# Patient Record
Sex: FEMALE | Race: WHITE | Employment: OTHER | URBAN - METROPOLITAN AREA
[De-identification: names, ages, dates, MRNs, and addresses within clinical notes are randomized per-mention and may not be internally consistent; named-entity substitution may affect disease eponyms.]

---

## 2017-01-05 ENCOUNTER — OFFICE VISIT (OUTPATIENT)
Dept: HEMATOLOGY/ONCOLOGY | Facility: HOSPITAL | Age: 82
End: 2017-01-05
Attending: INTERNAL MEDICINE
Payer: MEDICARE

## 2017-01-05 VITALS
DIASTOLIC BLOOD PRESSURE: 74 MMHG | BODY MASS INDEX: 20 KG/M2 | SYSTOLIC BLOOD PRESSURE: 164 MMHG | RESPIRATION RATE: 18 BRPM | HEART RATE: 68 BPM | TEMPERATURE: 97 F | WEIGHT: 107.81 LBS

## 2017-01-05 DIAGNOSIS — C50.411 BREAST CANCER OF UPPER-OUTER QUADRANT OF RIGHT FEMALE BREAST (HCC): Primary | ICD-10-CM

## 2017-01-05 DIAGNOSIS — C50.112 CANCER OF CENTRAL PORTION OF LEFT FEMALE BREAST (HCC): ICD-10-CM

## 2017-01-05 PROCEDURE — G0463 HOSPITAL OUTPT CLINIC VISIT: HCPCS | Performed by: INTERNAL MEDICINE

## 2017-01-05 PROCEDURE — 99213 OFFICE O/P EST LOW 20 MIN: CPT | Performed by: INTERNAL MEDICINE

## 2017-01-05 NOTE — PROGRESS NOTES
HPI    Patient returns for follow-up of her bilateral breast cancer. The first was a right possibly 40 years ago at [facility], and the second left hormone receptor positive, infiltrating ductal pT1a N0 M0 left breast cancer.   Patient had a [facility] Cardiovascular: Negative for chest pain and leg swelling. Gastrointestinal: Positive for diarrhea. Irritable bowel syndrome. Takes 1 imodium and that relieves symptoms for a time. Genitourinary: Positive for dysuria.         Cystitis, multiple Sexual Activity: Not on file   Not on file  Other Topics Concern    Caffeine Concern Yes    Comment: Coffee, 1 cup daily    Exercise No     Social History Narrative     Family History   Problem Relation Age of Onset   • Heart Disease Father      Per NG: c Cancer of central portion of left female breast (hcc)     Patient does not have evidence of local recurrence or metastatic disease. She will continue yearly mammograms. Patient has frequent UTI's. She is being followed by Dr. Sintia Cruz.     25 total agueda BI-RADS CATEGORY:       DIAGNOSTIC CATEGORY 2--BENIGN FINDING:         RECOMMENDATIONS:    ROUTINE SCREENING MAMMOGRAM AND CLINICAL EVALUATION.                Meds This Visit:  see list above    Imaging & Referrals:  [facility] (CPT=[id number])

## 2017-01-12 ENCOUNTER — OFFICE VISIT (OUTPATIENT)
Dept: SURGERY | Facility: CLINIC | Age: 82
End: 2017-01-12

## 2017-01-12 ENCOUNTER — APPOINTMENT (OUTPATIENT)
Dept: LAB | Age: 82
End: 2017-01-12
Attending: UROLOGY
Payer: MEDICARE

## 2017-01-12 ENCOUNTER — TELEPHONE (OUTPATIENT)
Dept: SURGERY | Facility: CLINIC | Age: 82
End: 2017-01-12

## 2017-01-12 VITALS — HEART RATE: 92 BPM | SYSTOLIC BLOOD PRESSURE: 146 MMHG | RESPIRATION RATE: 20 BRPM | DIASTOLIC BLOOD PRESSURE: 84 MMHG

## 2017-01-12 DIAGNOSIS — N39.0 RECURRENT UTI: Primary | ICD-10-CM

## 2017-01-12 DIAGNOSIS — N39.0 RECURRENT UTI: ICD-10-CM

## 2017-01-12 PROCEDURE — 87186 SC STD MICRODIL/AGAR DIL: CPT

## 2017-01-12 PROCEDURE — 99213 OFFICE O/P EST LOW 20 MIN: CPT | Performed by: UROLOGY

## 2017-01-12 PROCEDURE — 87088 URINE BACTERIA CULTURE: CPT

## 2017-01-12 PROCEDURE — 87086 URINE CULTURE/COLONY COUNT: CPT

## 2017-01-12 PROCEDURE — G0463 HOSPITAL OUTPT CLINIC VISIT: HCPCS | Performed by: UROLOGY

## 2017-01-12 RX ORDER — CIPROFLOXACIN 250 MG/1
250 TABLET, FILM COATED ORAL 2 TIMES DAILY
Qty: 6 TABLET | Refills: 3 | Status: SHIPPED | OUTPATIENT
Start: 2017-01-12 | End: 2017-01-27 | Stop reason: ALTCHOICE

## 2017-01-12 NOTE — TELEPHONE ENCOUNTER
Returned pt's call and spoke with her. She states she submitted a urine sample for c&s to [address] [place] [address] lab. Currently c/o urgency, frequency, and burning with urination. She denies fever or visualizing blood in urine.  She wants to know if Hansel Grier can order ab

## 2017-01-12 NOTE — TELEPHONE ENCOUNTER
Pt current symptoms: frequent, painful urination. Pt requesting a refill on: Cipro. pt states she left a urine sample at the [facility]/lab this morning.   Please advise, thank you. (pharmacy confirmed) unable to connect)

## 2017-01-12 NOTE — PROGRESS NOTES
Malcom Woodson is a 80year old female. HPI:   Patient presents with:  UTI: reoccuring UTIs.  Pt states she is currently waitin for urine culture results      72-year-old female with recurrent UTI-like symptoms presents in follow-up most recently seen Alcohol Use: No                 Medications (Active prior to today's visit):    Current Outpatient Prescriptions:  Cranberry-Vitamin C-Vitamin E (CRANBERRY PLUS VITAMIN C) 4200-20-3 MG-MG-UNIT Oral Cap Take 1 capsule by mouth 2 (two) times daily.  Disp:  Trang

## 2017-01-16 ENCOUNTER — TELEPHONE (OUTPATIENT)
Dept: SURGERY | Facility: CLINIC | Age: 82
End: 2017-01-16

## 2017-01-16 RX ORDER — CEFADROXIL 500 MG/1
500 CAPSULE ORAL 2 TIMES DAILY
Qty: 10 CAPSULE | Refills: 0 | Status: SHIPPED | OUTPATIENT
Start: 2017-01-16 | End: 2017-01-21

## 2017-01-16 NOTE — TELEPHONE ENCOUNTER
Staff please call this patient.   Let her know her urine culture is growing bacteria which is resistant to the Cipro that I prescribed I would recommend switching her to Duricef 500 mg p.o. twice daily and ask her to take probiotic pills alongside it since

## 2017-01-16 NOTE — TELEPHONE ENCOUNTER
Phoned pt and spoke with her. Read to her 's note as outlined below in this encounter, in it's entirety. Pt verbalized understanding, agrees to plan, and is thankful.

## 2017-01-16 NOTE — TELEPHONE ENCOUNTER
Dr. David Harvey, please see patient's final urine culture from 1/12/17. Cipro is resistant. See 1/12 last office visit. Patient has allergy to sulfa.

## 2017-01-27 ENCOUNTER — TELEPHONE (OUTPATIENT)
Dept: SURGERY | Facility: CLINIC | Age: 82
End: 2017-01-27

## 2017-01-27 ENCOUNTER — HOSPITAL ENCOUNTER (EMERGENCY)
Facility: HOSPITAL | Age: 82
Discharge: HOME | End: 2017-01-27
Attending: EMERGENCY MEDICINE
Payer: MEDICARE

## 2017-01-27 VITALS
BODY MASS INDEX: 20.39 KG/M2 | HEART RATE: 91 BPM | TEMPERATURE: 98 F | WEIGHT: 108 LBS | HEIGHT: 61 IN | OXYGEN SATURATION: 100 % | RESPIRATION RATE: 18 BRPM | DIASTOLIC BLOOD PRESSURE: 82 MMHG | SYSTOLIC BLOOD PRESSURE: 165 MMHG

## 2017-01-27 DIAGNOSIS — R33.9 URINARY RETENTION: Primary | ICD-10-CM

## 2017-01-27 LAB
BILIRUB UR QL: NEGATIVE
CLARITY UR: CLEAR
COLOR UR: YELLOW
GLUCOSE UR-MCNC: NEGATIVE MG/DL
HGB UR QL STRIP.AUTO: NEGATIVE
HYALINE CASTS #/AREA URNS AUTO: 1 /LPF
KETONES UR-MCNC: NEGATIVE MG/DL
NITRITE UR QL STRIP.AUTO: NEGATIVE
PH UR: 6 [PH] (ref 5–8)
PROT UR-MCNC: NEGATIVE MG/DL
RBC #/AREA URNS AUTO: 1 /HPF
SP GR UR STRIP: 1.01 (ref 1–1.03)
UROBILINOGEN UR STRIP-ACNC: <2
VIT C UR-MCNC: NEGATIVE MG/DL
WBC #/AREA URNS AUTO: 3 /HPF

## 2017-01-27 PROCEDURE — 99283 EMERGENCY DEPT VISIT LOW MDM: CPT

## 2017-01-27 PROCEDURE — 81001 URINALYSIS AUTO W/SCOPE: CPT

## 2017-01-27 RX ORDER — CEFADROXIL 500 MG/1
CAPSULE ORAL
Qty: 10 CAPSULE | Refills: 0 | Status: SHIPPED | OUTPATIENT
Start: 2017-01-27 | End: 2017-12-05

## 2017-01-27 NOTE — ED INITIAL ASSESSMENT (HPI)
Patient complains of  urinary retention since this am, admits to uti last week and finishing antibiotics last Saturday, denies pain at this time

## 2017-01-27 NOTE — TELEPHONE ENCOUNTER
Pt called again and she said that she has been trying to collect a urine specimen at home because she was given some sterile specimen containers but every time she feels that she has to urinate and goes to the BR she is unable to go.  She tried this 3 times

## 2017-01-27 NOTE — TELEPHONE ENCOUNTER
I spoke with pt and determined that she is still having burning with urination and perineal pressure despite having just finished Cefadroxil 500 mg BID for 5 days this past Sat. 1/21.  States that she is urinating 2 hours but does not have blood in her urin

## 2017-01-27 NOTE — ED NOTES
Patient voided approximately 700cc urine after arriving to room, states she feels much better. Last voided around 10am this morning.

## 2017-01-27 NOTE — ED PROVIDER NOTES
Patient Seen in: [facility] AND [facility] Emergency Department    History   Patient presents with:  Urinary Symptoms (urologic)    Stated Complaint: anuria    HPI    Patient complains of urinary retentin.   No urine for 5 hours, recent treatment for uti and se otherwise stated in HPI.     Physical Exam     ED Triage Vitals   BP 01/27/17 1519 165/82 mmHg   Pulse 01/27/17 1519 91   Resp 01/27/17 1519 18   Temp 01/27/17 1519 98 °F (36.7 °C)   Temp src --    SpO2 01/27/17 1519 100 %   O2 Device --        Current:BP 1

## 2017-01-30 ENCOUNTER — TELEPHONE (OUTPATIENT)
Dept: SURGERY | Facility: CLINIC | Age: 82
End: 2017-01-30

## 2017-02-07 NOTE — TELEPHONE ENCOUNTER
Patient contacted. Patient states on 1/27/17 she went to ER because she was having difficulty urinating. Patient states as soon as she got to ER, they told her it was ok to go in [place]", patient urinated fine. Urinalysis was sent.  Patient states she took

## 2017-02-08 NOTE — TELEPHONE ENCOUNTER
Urinalysis is mostly normal but I dont see that there was a urine culture done. Can we check on that?

## 2017-02-09 NOTE — TELEPHONE ENCOUNTER
Dr. Natalia Prado, I apologize; this is any FYI for you.  1/27 urinalysis from ER; no culture sent. Patient denies UTI symptoms. No further action required. Just FYI. Thanks.

## 2017-03-24 ENCOUNTER — APPOINTMENT (OUTPATIENT)
Dept: LAB | Age: 82
End: 2017-03-24
Attending: UROLOGY
Payer: MEDICARE

## 2017-03-24 DIAGNOSIS — N39.0 RECURRENT UTI: ICD-10-CM

## 2017-03-24 PROCEDURE — 87088 URINE BACTERIA CULTURE: CPT

## 2017-03-24 PROCEDURE — 87086 URINE CULTURE/COLONY COUNT: CPT

## 2017-03-24 PROCEDURE — 87186 SC STD MICRODIL/AGAR DIL: CPT

## 2017-03-27 ENCOUNTER — TELEPHONE (OUTPATIENT)
Dept: SURGERY | Facility: CLINIC | Age: 82
End: 2017-03-27

## 2017-03-27 RX ORDER — NITROFURANTOIN 25; 75 MG/1; MG/1
100 CAPSULE ORAL 2 TIMES DAILY
Qty: 20 CAPSULE | Refills: 0 | Status: SHIPPED | OUTPATIENT
Start: 2017-03-27 | End: 2017-04-06

## 2017-03-27 NOTE — TELEPHONE ENCOUNTER
Was asked by [facility], to speak with pt. Spoke with pt. She has positive c&s results. She is asking for abx. Advised her Guillermo Zarate is out of office, and Ivette Briones is covering, however he is in surgery at this time.  Advised her will notify , and will

## 2017-03-27 NOTE — TELEPHONE ENCOUNTER
Patient states that had lab work done on 03/24. Requesting results as soon as possible. Would like a call today. States has a bladder infection. Please call.  Thank you

## 2017-03-27 NOTE — TELEPHONE ENCOUNTER
Spoke with pt. She states she spoke with PCP and he wants urology to prescribe antibiotics. Paged , and spoke with him. Reviewed with him urine c&s results. He provided verbal order, ok to give Macrobid bid x 10 days. Or Keflex 500 bid x 10 days.  P

## 2017-03-31 ENCOUNTER — TELEPHONE (OUTPATIENT)
Dept: SURGERY | Facility: CLINIC | Age: 82
End: 2017-03-31

## 2017-03-31 NOTE — TELEPHONE ENCOUNTER
Phoned pt and spoke with her. She states she has had two episodes of diarrhea so far , since starting Macrobid 5 days ago. States she has been taking Florastore probiotic, since starting the abx.  States this always happens to her about the fourth or fifth

## 2017-03-31 NOTE — TELEPHONE ENCOUNTER
Pt experiencing diarrhea due to the antibiotics: Nitrofurantoin Monohyd Macro.  Please advise, thank you. (call connected)       Current Outpatient Prescriptions:  Nitrofurantoin Monohyd Macro 100 MG Oral Cap Take 1 capsule (100 mg total) by mouth 2 (two) t

## 2017-04-03 NOTE — TELEPHONE ENCOUNTER
Spoke with pt and told her that she does not have to submit another urine specimen if she is not having any symptoms. She understands and will call if her status changes.

## 2017-04-03 NOTE — TELEPHONE ENCOUNTER
Pt calling back with update, states she is no longer having symptoms, asking if she should still come in to give urine sample? Pls call thank you.

## 2017-06-27 ENCOUNTER — TELEPHONE (OUTPATIENT)
Dept: SURGERY | Facility: CLINIC | Age: 82
End: 2017-06-27

## 2017-06-27 NOTE — TELEPHONE ENCOUNTER
pt called. She has a standing order for urine good till 7/14/17. She asked if you could renew this order,  just in case she need after 7/14/17.     Thank you

## 2017-06-28 ENCOUNTER — APPOINTMENT (OUTPATIENT)
Dept: LAB | Age: 82
End: 2017-06-28
Attending: UROLOGY
Payer: MEDICARE

## 2017-06-28 PROCEDURE — 87077 CULTURE AEROBIC IDENTIFY: CPT | Performed by: UROLOGY

## 2017-06-28 PROCEDURE — 87086 URINE CULTURE/COLONY COUNT: CPT | Performed by: UROLOGY

## 2017-06-30 ENCOUNTER — TELEPHONE (OUTPATIENT)
Dept: SURGERY | Facility: CLINIC | Age: 82
End: 2017-06-30

## 2017-06-30 DIAGNOSIS — N39.0 URINARY TRACT INFECTION, SITE UNSPECIFIED: Primary | ICD-10-CM

## 2017-06-30 RX ORDER — AMOXICILLIN AND CLAVULANATE POTASSIUM 875; 125 MG/1; MG/1
1 TABLET, FILM COATED ORAL 2 TIMES DAILY
Qty: 14 TABLET | Refills: 0 | Status: SHIPPED | OUTPATIENT
Start: 2017-06-30 | End: 2017-07-10

## 2017-06-30 NOTE — TELEPHONE ENCOUNTER
Dr. Fili Marquez, pt is calling for test results, please advise. I copied and pasted results below. Pt also asking us to renew her standing order for urine culture I pended you the order if you agree please sign, thank you!     Collected:  6/28/2017  8:59 AM Stat

## 2017-06-30 NOTE — TELEPHONE ENCOUNTER
I prescribed Augmentin x 7 days if she has symptoms. Otherwise, if she has no symptoms no need for antibiotics. I also signed the standing order for a urine culture.   Thanks

## 2017-06-30 NOTE — TELEPHONE ENCOUNTER
Pt requesting urine test results. And has questions re: medications prescribed. Please call, thank you.

## 2017-07-07 NOTE — TELEPHONE ENCOUNTER
Spoke with pt and she reported that she already finished the ABX and is feeling better. She asked for an appt for her 6 mo f/u which is this mo.  I gave he an appt for Monday, 7/10 at 11:20am.

## 2017-07-10 ENCOUNTER — OFFICE VISIT (OUTPATIENT)
Dept: SURGERY | Facility: CLINIC | Age: 82
End: 2017-07-10

## 2017-07-10 VITALS
BODY MASS INDEX: 19.14 KG/M2 | SYSTOLIC BLOOD PRESSURE: 130 MMHG | RESPIRATION RATE: 16 BRPM | DIASTOLIC BLOOD PRESSURE: 80 MMHG | HEART RATE: 86 BPM | WEIGHT: 108 LBS | HEIGHT: 63 IN

## 2017-07-10 DIAGNOSIS — N39.0 RECURRENT UTI: Primary | ICD-10-CM

## 2017-07-10 DIAGNOSIS — R82.90 URINE FINDING: ICD-10-CM

## 2017-07-10 LAB
APPEARANCE: CLEAR
MULTISTIX LOT#: NORMAL NUMERIC
PH, URINE: 5 (ref 4.5–8)
SPECIFIC GRAVITY: 1.02 (ref 1–1.03)
URINE-COLOR: YELLOW
UROBILINOGEN,SEMI-QN: 0.2 MG/DL (ref 0–1.9)

## 2017-07-10 PROCEDURE — 81002 URINALYSIS NONAUTO W/O SCOPE: CPT | Performed by: UROLOGY

## 2017-07-10 PROCEDURE — 99213 OFFICE O/P EST LOW 20 MIN: CPT | Performed by: UROLOGY

## 2017-07-10 PROCEDURE — G0463 HOSPITAL OUTPT CLINIC VISIT: HCPCS | Performed by: UROLOGY

## 2017-07-20 ENCOUNTER — APPOINTMENT (OUTPATIENT)
Dept: LAB | Age: 82
End: 2017-07-20
Attending: UROLOGY
Payer: MEDICARE

## 2017-07-20 DIAGNOSIS — N39.0 RECURRENT UTI: ICD-10-CM

## 2017-07-20 PROCEDURE — 87088 URINE BACTERIA CULTURE: CPT

## 2017-07-20 PROCEDURE — 87186 SC STD MICRODIL/AGAR DIL: CPT

## 2017-07-20 PROCEDURE — 87086 URINE CULTURE/COLONY COUNT: CPT

## 2017-07-24 ENCOUNTER — TELEPHONE (OUTPATIENT)
Dept: SURGERY | Facility: CLINIC | Age: 82
End: 2017-07-24

## 2017-07-24 NOTE — TELEPHONE ENCOUNTER
pt is calling for her urine results. She would Really like to start on meds today. She states she is uncomfortable. Thank you.

## 2017-07-24 NOTE — TELEPHONE ENCOUNTER
pt called for 7/20/17 urine results. pt is uncomfortable, she would like a RX.  pt states Cant take Nitrofurantoin or Cephalexin   Thank you.

## 2017-07-25 RX ORDER — NITROFURANTOIN 25; 75 MG/1; MG/1
100 CAPSULE ORAL EVERY 12 HOURS
Qty: 20 CAPSULE | Refills: 0 | Status: SHIPPED | OUTPATIENT
Start: 2017-07-25 | End: 2017-12-05

## 2017-07-25 NOTE — TELEPHONE ENCOUNTER
She spoke with pt and determined that she has been calling about starting ABX meds because she has severe pain and burning with urination, cloudy foul smelling urine, and urinary frequency every hr.  I apologized that we did not get back to her sooner and I

## 2017-07-25 NOTE — TELEPHONE ENCOUNTER
Pt requesting urine culture results - upset because she has not received a cb - please advise - thank you.

## 2017-08-31 ENCOUNTER — APPOINTMENT (OUTPATIENT)
Dept: LAB | Age: 82
End: 2017-08-31
Attending: UROLOGY
Payer: MEDICARE

## 2017-08-31 DIAGNOSIS — N39.0 RECURRENT UTI: ICD-10-CM

## 2017-08-31 PROCEDURE — 87086 URINE CULTURE/COLONY COUNT: CPT

## 2017-09-05 ENCOUNTER — TELEPHONE (OUTPATIENT)
Dept: SURGERY | Facility: CLINIC | Age: 82
End: 2017-09-05

## 2017-09-06 NOTE — TELEPHONE ENCOUNTER
Spoke with pt and informed her that the urine test results did not show an infection and she states that she keeps having these periods on and off where she gets this urinary frequency and then slight burning with urination so she thinks that she has an in

## 2017-09-06 NOTE — TELEPHONE ENCOUNTER
Pt called stating pt has not heard back from the office with her urine test results. Pt does not know what to do. Pt might have an infection.     Please call

## 2017-09-08 RX ORDER — NITROFURANTOIN MACROCRYSTALS 50 MG/1
50 CAPSULE ORAL NIGHTLY
Qty: 90 CAPSULE | Refills: 3 | Status: SHIPPED | OUTPATIENT
Start: 2017-09-08 | End: 2017-12-05

## 2017-09-08 NOTE — TELEPHONE ENCOUNTER
I think we had treated her with nitrofurantoin 50 mg p.o. nightly. I reviewed her cultures and she did have one positive culture in July.   The only side effect that we are worried about them that we tell patients about his pulmonary fibrosis or scarring i

## 2017-09-08 NOTE — TELEPHONE ENCOUNTER
Spoke with pt and informed her of ELISHA's response in his msg belwo and she verbalized understanding but does not want to risk the SE of pulmonary fibrosis and decide that she is going to increase the use of her Cranberry pills to 6 tabs per day and also try

## 2017-09-14 ENCOUNTER — OFFICE VISIT (OUTPATIENT)
Dept: AUDIOLOGY | Facility: CLINIC | Age: 82
End: 2017-09-14

## 2017-09-14 DIAGNOSIS — H90.3 SENSORINEURAL HEARING LOSS, BILATERAL: Primary | ICD-10-CM

## 2017-09-14 PROCEDURE — V5264 EAR MOLD/INSERT: HCPCS | Performed by: AUDIOLOGIST

## 2017-09-14 PROCEDURE — 92593 HEARING AID CHECK, BOTH EARS: CPT | Performed by: AUDIOLOGIST

## 2017-09-14 NOTE — PROGRESS NOTES
HEARING AID FOLLOW-UP    Caiocelena Anayashabana  3/2/1925  [id number]      Pt was seen for a 12 months follow-up. Aids were in good condition with the exception of a large hole in the right cShell. Pt agreed to a new cShell.   Pt advised about the charge of $1

## 2017-10-05 ENCOUNTER — OFFICE VISIT (OUTPATIENT)
Dept: AUDIOLOGY | Facility: CLINIC | Age: 82
End: 2017-10-05

## 2017-10-05 ENCOUNTER — OFFICE VISIT (OUTPATIENT)
Dept: OTOLARYNGOLOGY | Facility: CLINIC | Age: 82
End: 2017-10-05

## 2017-10-05 VITALS
HEIGHT: 61 IN | DIASTOLIC BLOOD PRESSURE: 90 MMHG | SYSTOLIC BLOOD PRESSURE: 144 MMHG | BODY MASS INDEX: 20.39 KG/M2 | WEIGHT: 108 LBS | TEMPERATURE: 98 F

## 2017-10-05 DIAGNOSIS — H90.3 SENSORINEURAL HEARING LOSS, BILATERAL: Primary | ICD-10-CM

## 2017-10-05 DIAGNOSIS — H61.23 BILATERAL IMPACTED CERUMEN: Primary | ICD-10-CM

## 2017-10-05 PROCEDURE — V5266 BATTERY FOR HEARING DEVICE: HCPCS | Performed by: AUDIOLOGIST

## 2017-10-05 PROCEDURE — 69210 REMOVE IMPACTED EAR WAX UNI: CPT | Performed by: OTOLARYNGOLOGY

## 2017-10-05 PROCEDURE — 92593 HEARING AID CHECK, BOTH EARS: CPT | Performed by: AUDIOLOGIST

## 2017-10-05 NOTE — PROGRESS NOTES
HEARING AID FOLLOW-UP    Vernon Sharma  3/2/1925  [id number]    Dispensed new right cShell: [id number]   224    Software indicated a connection problem for both aids and indicated to reset to factory settings.   Reset right aid to factory settin

## 2017-10-05 NOTE — PROGRESS NOTES
Chester Lee is a 80year old female. Patient presents with:  Ear Problem: cleaning      HISTORY OF PRESENT ILLNESS    Patient presents for cerumen removal. No other complaints or concerns at this time    Social History    Marital status:   rash.   Hema/Lymph Negative Easy bleeding and easy bruising.            PHYSICAL EXAM    /90 (BP Location: Left arm)   Temp 98 °F (36.7 °C) (Tympanic)   Ht 5' 1\" (1.549 m)   Wt 108 lb (49 kg)   BMI 20.41 kg/m²        Constitutional Normal Overall livier (PRINIVIL,ZESTRIL) 10 MG Oral Tab, Take 5 mg by mouth.  , Disp: , Rfl:   •  PARoxetine HCl (PAXIL) 10 MG Oral Tab, Take  by mouth. take 1 tablet (10MG)  by ORAL route  every day, Disp: , Rfl:   ASSESSMENT AND PLAN    1.  Bilateral impacted cerumen    - LUIZ

## 2017-10-05 NOTE — PATIENT INSTRUCTIONS
Earwax Removal    The ear canal makes earwax from the canal’s lining. The ears make wax to lubricate and protect the ear canal. The ear canal is the tube that connects the middle ear to the outside of the ear.  The wax protects the ear from bacteria, infe · Don’t use cotton swabs in your ears. Cotton swabs may push wax deeper into the ear canal or damage the eardrum.  Use cotton gauze or a wet washcloth  to gently remove wax on the outside of the ear and around the opening to the ear canal.  · Don't use any © 0893-0368 [address], [address] [place]. All rights reserved. This information is not intended as a substitute for professional medical care. Always follow your healthcare professional's instructions.

## 2017-11-08 ENCOUNTER — HOSPITAL ENCOUNTER (OUTPATIENT)
Age: 82
Discharge: HOME | End: 2017-11-08
Attending: FAMILY MEDICINE
Payer: MEDICARE

## 2017-11-08 VITALS
OXYGEN SATURATION: 98 % | SYSTOLIC BLOOD PRESSURE: 147 MMHG | TEMPERATURE: 98 F | HEART RATE: 89 BPM | RESPIRATION RATE: 18 BRPM | DIASTOLIC BLOOD PRESSURE: 91 MMHG

## 2017-11-08 DIAGNOSIS — J06.9 VIRAL UPPER RESPIRATORY TRACT INFECTION: Primary | ICD-10-CM

## 2017-11-08 PROCEDURE — 99214 OFFICE O/P EST MOD 30 MIN: CPT

## 2017-11-08 PROCEDURE — 87430 STREP A AG IA: CPT

## 2017-11-08 PROCEDURE — 99213 OFFICE O/P EST LOW 20 MIN: CPT

## 2017-11-08 RX ORDER — AMOXICILLIN 875 MG/1
875 TABLET, COATED ORAL 2 TIMES DAILY
Qty: 20 TABLET | Refills: 0 | Status: SHIPPED | OUTPATIENT
Start: 2017-11-08 | End: 2017-11-18

## 2017-11-08 NOTE — ED INITIAL ASSESSMENT (HPI)
Sore throat and cold for one week. Easy non labored resp. Speech clear runny nose no cough.  Has taken nothing for symptoms

## 2017-11-08 NOTE — ED PROVIDER NOTES
Patient Seen in: [facility] In [address] [place] [address]    History   Patient presents with:  Sore Throat    Stated Complaint: sorethroat    CC: st    HPI: Pt p/w co st, cough, congestion x I week; No fever, no chills, \"was worried about strep. \" Exam  ED Triage Vitals (11/08/17 0905)  BP: 147/91  Pulse: 89  Resp: 18  Temp: 98 °F (36.7 °C)  Temp src: Oral  SpO2: 98 %  O2 Device: None (Room air)    General Appearance:    Alert, cooperative, no distress, appears stated age  Head:    Normocephalic, wi note and vitals reviewed.           ED Course     Labs Reviewed   EM POCT RAPID STREP - Normal      pgs ss neg    ED Course as of Nov 08 1002  ------------------------------------------------------------       MDM       ddx dw: viral uri vs pauline Leonard

## 2017-11-08 NOTE — ED NOTES
Increase po fluids rest tylenol for fever or pain. Call and follow up with pcp in 3 days if not better.  Go to the ed for new or worse concerns

## 2017-11-09 ENCOUNTER — OFFICE VISIT (OUTPATIENT)
Dept: AUDIOLOGY | Facility: CLINIC | Age: 82
End: 2017-11-09

## 2017-11-09 DIAGNOSIS — H90.3 SENSORINEURAL HEARING LOSS, BILATERAL: Primary | ICD-10-CM

## 2017-11-09 PROCEDURE — 92593 HEARING AID CHECK, BOTH EARS: CPT | Performed by: AUDIOLOGIST

## 2017-11-10 NOTE — PROGRESS NOTES
HEARING AID FOLLOW-UP    Raji Christopher  3/2/1925  [id number]    Patient noted poorer hearing since last programming change using most current audiogram.  Programmed aids to previous settings which pt reported was louder/clearer. NC visit.   RV as ruddy

## 2017-12-05 ENCOUNTER — OFFICE VISIT (OUTPATIENT)
Dept: SURGERY | Facility: CLINIC | Age: 82
End: 2017-12-05

## 2017-12-05 VITALS
WEIGHT: 108 LBS | DIASTOLIC BLOOD PRESSURE: 92 MMHG | SYSTOLIC BLOOD PRESSURE: 160 MMHG | HEIGHT: 61 IN | BODY MASS INDEX: 20.39 KG/M2 | TEMPERATURE: 98 F | HEART RATE: 73 BPM

## 2017-12-05 DIAGNOSIS — N39.0 RECURRENT UTI: Primary | ICD-10-CM

## 2017-12-05 DIAGNOSIS — R82.90 URINE FINDING: ICD-10-CM

## 2017-12-05 PROCEDURE — 99213 OFFICE O/P EST LOW 20 MIN: CPT | Performed by: UROLOGY

## 2017-12-05 PROCEDURE — 81003 URINALYSIS AUTO W/O SCOPE: CPT | Performed by: UROLOGY

## 2017-12-05 RX ORDER — CIPROFLOXACIN 250 MG/1
250 TABLET, FILM COATED ORAL 2 TIMES DAILY
Qty: 10 TABLET | Refills: 3 | Status: SHIPPED | OUTPATIENT
Start: 2017-12-05 | End: 2018-12-06 | Stop reason: ALTCHOICE

## 2017-12-05 NOTE — PROGRESS NOTES
Magnus Bowie is a 80year old female. HPI:   Patient presents with:  UTI: Denies recurrent UTI's, flank pain , dysuria and gross hematuria. 80year-old presents in follow-up to a previous visit life 10/2017.   She has a history of recurrent ur (VITAMIN D) 2000 UNITS Oral Cap Take 1 capsule by mouth daily. Disp:  Rfl:    lisinopril (PRINIVIL,ZESTRIL) 10 MG Oral Tab Take 5 mg by mouth. Disp:  Rfl:    PARoxetine HCl (PAXIL) 10 MG Oral Tab Take  by mouth.  take 1 tablet (10MG)  by ORAL route  every

## 2017-12-18 ENCOUNTER — HOSPITAL ENCOUNTER (OUTPATIENT)
Dept: MAMMOGRAPHY | Facility: HOSPITAL | Age: 82
Discharge: HOME | End: 2017-12-18
Attending: INTERNAL MEDICINE
Payer: MEDICARE

## 2017-12-18 DIAGNOSIS — C50.411 BREAST CANCER OF UPPER-OUTER QUADRANT OF RIGHT FEMALE BREAST (HCC): ICD-10-CM

## 2017-12-18 DIAGNOSIS — C50.112 CANCER OF CENTRAL PORTION OF LEFT FEMALE BREAST (HCC): ICD-10-CM

## 2017-12-18 PROCEDURE — 77066 DX MAMMO INCL CAD BI: CPT | Performed by: INTERNAL MEDICINE

## 2018-01-04 ENCOUNTER — OFFICE VISIT (OUTPATIENT)
Dept: HEMATOLOGY/ONCOLOGY | Facility: HOSPITAL | Age: 83
End: 2018-01-04
Attending: INTERNAL MEDICINE
Payer: MEDICARE

## 2018-01-04 VITALS
TEMPERATURE: 98 F | HEART RATE: 77 BPM | RESPIRATION RATE: 18 BRPM | BODY MASS INDEX: 20 KG/M2 | DIASTOLIC BLOOD PRESSURE: 81 MMHG | SYSTOLIC BLOOD PRESSURE: 159 MMHG | WEIGHT: 108 LBS

## 2018-01-04 DIAGNOSIS — Z17.0 MALIGNANT NEOPLASM OF CENTRAL PORTION OF LEFT BREAST IN FEMALE, ESTROGEN RECEPTOR POSITIVE (HCC): Primary | ICD-10-CM

## 2018-01-04 DIAGNOSIS — K21.9 GASTROESOPHAGEAL REFLUX DISEASE WITHOUT ESOPHAGITIS: ICD-10-CM

## 2018-01-04 DIAGNOSIS — C50.411 MALIGNANT NEOPLASM OF UPPER-OUTER QUADRANT OF RIGHT FEMALE BREAST, UNSPECIFIED ESTROGEN RECEPTOR STATUS (HCC): ICD-10-CM

## 2018-01-04 DIAGNOSIS — F41.9 ANXIETY AND DEPRESSION: ICD-10-CM

## 2018-01-04 DIAGNOSIS — C50.112 MALIGNANT NEOPLASM OF CENTRAL PORTION OF LEFT BREAST IN FEMALE, ESTROGEN RECEPTOR POSITIVE (HCC): Primary | ICD-10-CM

## 2018-01-04 DIAGNOSIS — K58.2 IRRITABLE BOWEL SYNDROME WITH BOTH CONSTIPATION AND DIARRHEA: ICD-10-CM

## 2018-01-04 DIAGNOSIS — F32.A ANXIETY AND DEPRESSION: ICD-10-CM

## 2018-01-04 DIAGNOSIS — N39.0 URINARY TRACT INFECTION WITHOUT HEMATURIA, SITE UNSPECIFIED: ICD-10-CM

## 2018-01-04 PROCEDURE — 99213 OFFICE O/P EST LOW 20 MIN: CPT | Performed by: INTERNAL MEDICINE

## 2018-01-04 PROCEDURE — G0463 HOSPITAL OUTPT CLINIC VISIT: HCPCS | Performed by: INTERNAL MEDICINE

## 2018-01-04 NOTE — PROGRESS NOTES
HPI    Patient returns for follow-up of her bilateral breast cancer. The first was a right possibly 40 years ago at [place], and the second left hormone receptor positive, infiltrating ductal pT1a N0 M0 left breast cancer.   Patient had a [facility] Eyes: Positive for itching. Reading glasses   Respiratory: Negative for chest tightness and shortness of breath. Cardiovascular: Negative for chest pain and leg swelling.         Blood pressure at home this morning was 129/84   Gastrointestinal: P • Essential hypertension    • Irritable bowel syndrome    • S/P ear surgery     Per NG: Ear surgery X2   • Urinary tract infection 10/29/2013   • Varicose veins     Per NG:Stripping and ligation     Past Surgical History:  No date: CHOLECYSTECTOMY      Com Pulmonary/Chest: Effort normal and breath sounds normal. No respiratory distress. She exhibits no tenderness. Right breast exhibits no inverted nipple, no mass, no nipple discharge, no skin change and no tenderness. Left breast exhibits skin change.  Left b Patient is continuing therapy with paroxetine 10 mg daily. Patient's blood pressure was elevated here today but was normal at home this morning. 25 total minutes spent with patient >50% of visit was spent in counseling and coordination of care.      R ROUTINE SCREENING MAMMOGRAM AND CLINICAL EVALUATION.                Meds This Visit:  see list above    Imaging & Referrals:  Diagnostic bilateral mammogram December 2018

## 2018-01-07 PROBLEM — F41.9 ANXIETY AND DEPRESSION: Status: ACTIVE | Noted: 2018-01-07

## 2018-01-07 PROBLEM — K58.9 IRRITABLE BOWEL SYNDROME: Status: ACTIVE | Noted: 2018-01-07

## 2018-01-07 PROBLEM — F32.A ANXIETY AND DEPRESSION: Status: ACTIVE | Noted: 2018-01-07

## 2018-09-25 ENCOUNTER — APPOINTMENT (OUTPATIENT)
Dept: LAB | Age: 83
End: 2018-09-25
Attending: UROLOGY
Payer: MEDICARE

## 2018-09-25 DIAGNOSIS — N39.0 RECURRENT UTI: ICD-10-CM

## 2018-09-25 PROCEDURE — 87086 URINE CULTURE/COLONY COUNT: CPT

## 2018-09-25 PROCEDURE — 87077 CULTURE AEROBIC IDENTIFY: CPT

## 2018-09-25 PROCEDURE — 87186 SC STD MICRODIL/AGAR DIL: CPT

## 2018-09-27 ENCOUNTER — TELEPHONE (OUTPATIENT)
Dept: SURGERY | Facility: CLINIC | Age: 83
End: 2018-09-27

## 2018-09-27 DIAGNOSIS — N39.0 URINARY TRACT INFECTION WITHOUT HEMATURIA, SITE UNSPECIFIED: Primary | ICD-10-CM

## 2018-09-27 RX ORDER — AMOXICILLIN AND CLAVULANATE POTASSIUM 875; 125 MG/1; MG/1
1 TABLET, FILM COATED ORAL 2 TIMES DAILY
Qty: 14 TABLET | Refills: 0 | Status: SHIPPED | OUTPATIENT
Start: 2018-09-27 | End: 2018-10-04

## 2018-09-27 NOTE — TELEPHONE ENCOUNTER
pt called. She asked if you could you please place a new standing order for urinalysis, she states this is about to  soon. And She is very thankful for you calling her antibiotics in so quickly for her UTI.

## 2018-09-27 NOTE — TELEPHONE ENCOUNTER
Phoned pt and spoke with her. Informed her of abx as ordered. She states she already picked up the rx and is thankful. She will call back if any issues.

## 2018-09-28 ENCOUNTER — OFFICE VISIT (OUTPATIENT)
Dept: AUDIOLOGY | Facility: CLINIC | Age: 83
End: 2018-09-28

## 2018-09-28 DIAGNOSIS — H90.6 MIXED HEARING LOSS, BILATERAL: Primary | ICD-10-CM

## 2018-09-28 PROCEDURE — 92593 HEARING AID CHECK, BOTH EARS: CPT | Performed by: AUDIOLOGIST

## 2018-09-28 PROCEDURE — V5266 BATTERY FOR HEARING DEVICE: HCPCS | Performed by: AUDIOLOGIST

## 2018-09-28 NOTE — TELEPHONE ENCOUNTER
I signed this order. Please make sure patient is aware however she need to follow up annually, she is due to follow up in December.       Thank you,  Adela NEGRETE

## 2018-09-28 NOTE — TELEPHONE ENCOUNTER
Patient's last office visit = 12/2017 for recurrent UTI. Patient is requesting standing order for urine cultures. At 7/10/17 office visit;  Assessment/Plan states that MD provided her with standing order for urine culture if she gets recurrent UTI symp

## 2018-09-28 NOTE — PROGRESS NOTES
HEARING AID FOLLOW-UP    Bee Ernesto  3/2/1925  [id number]    Patient is here for an annual check.     In office the following actions were taken:  Cleaned aid  Cleaned earmold  Suctioned microphone port  Suctioned  port  Changed microphone gu

## 2018-10-04 NOTE — TELEPHONE ENCOUNTER
Spoke to patient and notified her that standing orders for urine culture were extended in her chart and that she should f/u in [facility] for f/u visit appointment made

## 2018-10-10 ENCOUNTER — APPOINTMENT (OUTPATIENT)
Dept: LAB | Age: 83
End: 2018-10-10
Attending: UROLOGY
Payer: MEDICARE

## 2018-10-10 DIAGNOSIS — N39.0 RECURRENT UTI: ICD-10-CM

## 2018-10-10 DIAGNOSIS — N39.0 URINARY TRACT INFECTION WITHOUT HEMATURIA, SITE UNSPECIFIED: ICD-10-CM

## 2018-10-10 PROCEDURE — 87086 URINE CULTURE/COLONY COUNT: CPT

## 2018-10-10 PROCEDURE — 87088 URINE BACTERIA CULTURE: CPT

## 2018-10-10 PROCEDURE — 87186 SC STD MICRODIL/AGAR DIL: CPT

## 2018-10-12 ENCOUNTER — TELEPHONE (OUTPATIENT)
Dept: SURGERY | Facility: CLINIC | Age: 83
End: 2018-10-12

## 2018-10-12 RX ORDER — CEFADROXIL 500 MG/1
CAPSULE ORAL
Qty: 10 CAPSULE | Refills: 0 | Status: SHIPPED | OUTPATIENT
Start: 2018-10-12 | End: 2018-12-06 | Stop reason: ALTCHOICE

## 2018-10-12 NOTE — TELEPHONE ENCOUNTER
I spoke with pt and informed her of ELISHA's results msg below and she verbalized understanding and I told her that I will send the Cefadroxil to her [facility] pharmacy.      Result Notes for URINE CULTURE, ROUTINE     Notes recorded by Mayito Pacheco MD on 10/11/2

## 2018-10-23 ENCOUNTER — APPOINTMENT (OUTPATIENT)
Dept: LAB | Age: 83
End: 2018-10-23
Attending: UROLOGY
Payer: MEDICARE

## 2018-10-23 DIAGNOSIS — N39.0 RECURRENT UTI: ICD-10-CM

## 2018-10-23 PROCEDURE — 87186 SC STD MICRODIL/AGAR DIL: CPT

## 2018-10-23 PROCEDURE — 87086 URINE CULTURE/COLONY COUNT: CPT

## 2018-10-25 ENCOUNTER — TELEPHONE (OUTPATIENT)
Dept: SURGERY | Facility: CLINIC | Age: 83
End: 2018-10-25

## 2018-10-25 DIAGNOSIS — N39.0 RECURRENT UTI: Primary | ICD-10-CM

## 2018-10-25 RX ORDER — AMOXICILLIN AND CLAVULANATE POTASSIUM 875; 125 MG/1; MG/1
1 TABLET, FILM COATED ORAL 2 TIMES DAILY
Qty: 14 TABLET | Refills: 0 | Status: SHIPPED | OUTPATIENT
Start: 2018-10-25 | End: 2018-11-01

## 2018-10-25 NOTE — TELEPHONE ENCOUNTER
I called and spoke with patient. She complains of frequency, urgency, and dysuria. She denies fevers, chills, flank pain, or gross hematuria. She has been drinking water and she also takes cranberry pills. Plan to treat.   This is her 3rd infection in 1

## 2018-11-06 ENCOUNTER — APPOINTMENT (OUTPATIENT)
Dept: LAB | Age: 83
End: 2018-11-06
Attending: NURSE PRACTITIONER
Payer: MEDICARE

## 2018-11-06 PROCEDURE — 87186 SC STD MICRODIL/AGAR DIL: CPT | Performed by: NURSE PRACTITIONER

## 2018-11-06 PROCEDURE — 87077 CULTURE AEROBIC IDENTIFY: CPT | Performed by: NURSE PRACTITIONER

## 2018-11-06 PROCEDURE — 87086 URINE CULTURE/COLONY COUNT: CPT | Performed by: NURSE PRACTITIONER

## 2018-11-07 ENCOUNTER — TELEPHONE (OUTPATIENT)
Dept: SURGERY | Facility: CLINIC | Age: 83
End: 2018-11-07

## 2018-11-07 DIAGNOSIS — N39.0 URINARY TRACT INFECTION WITHOUT HEMATURIA, SITE UNSPECIFIED: Primary | ICD-10-CM

## 2018-11-07 DIAGNOSIS — N39.0 RECURRENT UTI: Primary | ICD-10-CM

## 2018-11-07 RX ORDER — NITROFURANTOIN 25; 75 MG/1; MG/1
100 CAPSULE ORAL 2 TIMES DAILY
Qty: 20 CAPSULE | Refills: 0 | Status: SHIPPED | OUTPATIENT
Start: 2018-11-07 | End: 2018-11-17

## 2018-11-07 NOTE — TELEPHONE ENCOUNTER
Patient calling. Requesting to speak with clinical staff regarding results that were also given to her daughter.  -- PLEASE SEE PREVIOUS TE

## 2018-11-07 NOTE — TELEPHONE ENCOUNTER
----- Message from PENELOPE Drew sent at 11/7/2018  2:30 PM CST -----  Staff,    Patient has a standing order for urine culture. Urine culture positive. She has had recurrent infections over the last 2 months.   She has a follow up scheduled wi

## 2018-11-07 NOTE — TELEPHONE ENCOUNTER
Phoned pt twice on home number, however received solid busy signal,twice. LORENZO noted in epic, for Rodrigo Winters. Phoned Rodrigo Winters  and received her voice mail. Left detailed message regarding abx, and need to call back to confirm message received.  Clinic call back numb

## 2018-11-07 NOTE — TELEPHONE ENCOUNTER
Nuno Cross returned call. She confirms she received my call, and states pt is her mother. Read to her APN, Megha's result note as outlined below in this encounter, in it's entirety. Verified fov with her, as well.  She verbalized understanding, agrees to

## 2018-11-08 NOTE — TELEPHONE ENCOUNTER
I spoke to Dr. Melissa Charles regarding this patient and due to her recurrent UTI he would like her to have a kidney US before her visit with him. Can you please let her know. I will place the order.     Thank you,  Lexis NEGRETE

## 2018-11-08 NOTE — TELEPHONE ENCOUNTER
Phoned pt and spoke with her. Read to her Apn, Megha's note as outlined below in this encounter, in it's entirety. Pt verbalized understanding, agrees to plan, and is thankful. Provided her with central scheduling tel # to arrange .  Pt would like Kevin Fischer

## 2018-11-15 ENCOUNTER — HOSPITAL ENCOUNTER (OUTPATIENT)
Dept: ULTRASOUND IMAGING | Facility: HOSPITAL | Age: 83
Discharge: HOME | End: 2018-11-15
Attending: NURSE PRACTITIONER
Payer: MEDICARE

## 2018-11-15 DIAGNOSIS — N39.0 RECURRENT UTI: ICD-10-CM

## 2018-11-15 PROCEDURE — 76775 US EXAM ABDO BACK WALL LIM: CPT | Performed by: NURSE PRACTITIONER

## 2018-11-20 ENCOUNTER — TELEPHONE (OUTPATIENT)
Dept: SURGERY | Facility: CLINIC | Age: 83
End: 2018-11-20

## 2018-11-20 DIAGNOSIS — D17.71 RENAL ANGIOMYOLIPOMA: Primary | ICD-10-CM

## 2018-11-20 NOTE — TELEPHONE ENCOUNTER
Spoke with pt and informed her that the results are up but have not been signed off yet and I will send a msg to [address] [place] [address] asking either to please sign off the renal US results of 11/15 and we will get back to her with a response. Tasked to [facility] and ELISHA.

## 2018-11-21 NOTE — TELEPHONE ENCOUNTER
Staff please call her back. Renal US shows no kidney swelling or stones. There are some kidney cysts on both sides.   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

## 2018-11-23 NOTE — TELEPHONE ENCOUNTER
Spoke with pt and informed her of ELISHA's msg and instructions below and she verbalized understanding and will comply. I gave the schdg # to call and pt has an appt already for 12/6.

## 2018-11-29 ENCOUNTER — HOSPITAL ENCOUNTER (OUTPATIENT)
Dept: CT IMAGING | Facility: HOSPITAL | Age: 83
Discharge: HOME | End: 2018-11-29
Attending: UROLOGY
Payer: MEDICARE

## 2018-11-29 DIAGNOSIS — D17.71 RENAL ANGIOMYOLIPOMA: ICD-10-CM

## 2018-11-29 PROCEDURE — 76376 3D RENDER W/INTRP POSTPROCES: CPT | Performed by: UROLOGY

## 2018-11-29 PROCEDURE — 74178 CT ABD&PLV WO CNTR FLWD CNTR: CPT | Performed by: UROLOGY

## 2018-11-29 PROCEDURE — 82565 ASSAY OF CREATININE: CPT

## 2018-12-05 ENCOUNTER — TELEPHONE (OUTPATIENT)
Dept: SURGERY | Facility: CLINIC | Age: 83
End: 2018-12-05

## 2018-12-05 NOTE — TELEPHONE ENCOUNTER
----- Message from Marcellus Storm MD sent at 12/4/2018  2:41 PM CST -----  Staff please call this patient. 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 This is a fatty tumor that does not tend to metastasize as suggested by the ultrasound but can be prone to bleeding or pain if it becomes larger in size. Please have her follow-up to see me in the next 1-2 weeks to review these results in more detail and determine if additional therapy or intervention is warranted.

## 2018-12-06 ENCOUNTER — TELEPHONE (OUTPATIENT)
Dept: SURGERY | Facility: CLINIC | Age: 83
End: 2018-12-06

## 2018-12-06 ENCOUNTER — OFFICE VISIT (OUTPATIENT)
Dept: SURGERY | Facility: CLINIC | Age: 83
End: 2018-12-06
Payer: MEDICARE

## 2018-12-06 VITALS
HEART RATE: 74 BPM | HEIGHT: 60 IN | DIASTOLIC BLOOD PRESSURE: 76 MMHG | SYSTOLIC BLOOD PRESSURE: 144 MMHG | BODY MASS INDEX: 20.62 KG/M2 | WEIGHT: 105 LBS

## 2018-12-06 DIAGNOSIS — D17.71 RENAL ANGIOMYOLIPOMA: ICD-10-CM

## 2018-12-06 DIAGNOSIS — N39.0 RECURRENT UTI: Primary | ICD-10-CM

## 2018-12-06 PROCEDURE — G0463 HOSPITAL OUTPT CLINIC VISIT: HCPCS | Performed by: UROLOGY

## 2018-12-06 PROCEDURE — 99213 OFFICE O/P EST LOW 20 MIN: CPT | Performed by: UROLOGY

## 2018-12-06 RX ORDER — CIPROFLOXACIN 250 MG/1
250 TABLET, FILM COATED ORAL 2 TIMES DAILY
Qty: 10 TABLET | Refills: 3 | Status: SHIPPED | OUTPATIENT
Start: 2018-12-06 | End: 2018-12-06 | Stop reason: CLARIF

## 2018-12-06 RX ORDER — CIPROFLOXACIN 250 MG/1
TABLET, FILM COATED ORAL
Qty: 10 TABLET | Refills: 3 | Status: SHIPPED | OUTPATIENT
Start: 2018-12-06 | End: 2019-02-01

## 2018-12-06 NOTE — TELEPHONE ENCOUNTER
Spoke with ELISHA and informed him of the error on the Cipro script and he stated that he meant to order Cipro 250 mg 1 tab by mouth every 12 hrs for 5 days, # 10 with 3 refills. He asked that I send a new script to the pharmacy. I did this.

## 2018-12-06 NOTE — PROGRESS NOTES
Shlomo Purcell is a 80year old female. HPI:   Patient presents with: Follow - Up: Patient is a 80year old female here for annual visit. LOV was on 12/5/17. On 10/11/18 patient was positive for E coli and was Rx Duricef 500mg.    UTI: Currently christina Breast Cancer Self 66      Social History: Social History    Tobacco Use      Smoking status: Never Smoker      Smokeless tobacco: Never Used    Alcohol use: No      Alcohol/week: 0.0 oz    Drug use: No       Medications (Active prior to today's visit): culture and Cipro which she normally uses until the urine cultures come back. Otherwise she will follow-up in 6 months.          Orders This Visit:  Orders Placed This Encounter      Urine Culture, Routine      Meds This Visit:  Requested Prescriptions

## 2018-12-14 ENCOUNTER — APPOINTMENT (OUTPATIENT)
Dept: LAB | Age: 83
End: 2018-12-14
Attending: NURSE PRACTITIONER
Payer: MEDICARE

## 2018-12-14 DIAGNOSIS — N39.0 RECURRENT UTI: ICD-10-CM

## 2018-12-14 PROCEDURE — 87086 URINE CULTURE/COLONY COUNT: CPT

## 2018-12-17 ENCOUNTER — TELEPHONE (OUTPATIENT)
Dept: SURGERY | Facility: CLINIC | Age: 83
End: 2018-12-17

## 2018-12-17 NOTE — TELEPHONE ENCOUNTER
Phoned pt and spoke with her. Read to her 's result note below. Pt then states she felt like she had an infection so she took three days of cipro, felt better and stopped the abx. States she now has burning with urination. Asked pt if she submitted the sample before starting the abx. She stated she did not. She took 2 days of cipro before submitting the sample and stopped the abx five days ago. Informed pt she should submit the sample before starting the abx, because now there is no way to know for sure if there was a uti, or what the organism was. Also informed her she should have completed the course of abx, as prescribed. Advised her to call later in the week with an update, and for further instructions, as a repeat c&s may be needed. She verbalized understanding, agrees to plan, and is thankful. lashawn Harp, please advise. Thank you.         Result Notes for URINE CULTURE, ROUTINE     Notes recorded by Jeremiah Davidson MD on 12/17/2018 at 8:31 AM CST  Negative urine culture

## 2018-12-18 ENCOUNTER — HOSPITAL ENCOUNTER (OUTPATIENT)
Dept: MAMMOGRAPHY | Facility: HOSPITAL | Age: 83
Discharge: HOME | End: 2018-12-18
Attending: INTERNAL MEDICINE
Payer: MEDICARE

## 2018-12-18 DIAGNOSIS — Z17.0 MALIGNANT NEOPLASM OF CENTRAL PORTION OF LEFT BREAST IN FEMALE, ESTROGEN RECEPTOR POSITIVE (HCC): ICD-10-CM

## 2018-12-18 DIAGNOSIS — C50.112 MALIGNANT NEOPLASM OF CENTRAL PORTION OF LEFT BREAST IN FEMALE, ESTROGEN RECEPTOR POSITIVE (HCC): ICD-10-CM

## 2018-12-18 DIAGNOSIS — C50.411 MALIGNANT NEOPLASM OF UPPER-OUTER QUADRANT OF RIGHT FEMALE BREAST, UNSPECIFIED ESTROGEN RECEPTOR STATUS (HCC): ICD-10-CM

## 2018-12-18 PROCEDURE — 77066 DX MAMMO INCL CAD BI: CPT | Performed by: INTERNAL MEDICINE

## 2018-12-18 PROCEDURE — 77062 BREAST TOMOSYNTHESIS BI: CPT | Performed by: INTERNAL MEDICINE

## 2018-12-20 ENCOUNTER — TELEPHONE (OUTPATIENT)
Dept: SURGERY | Facility: CLINIC | Age: 83
End: 2018-12-20

## 2018-12-20 DIAGNOSIS — R30.0 BURNING WITH URINATION: Primary | ICD-10-CM

## 2018-12-20 NOTE — TELEPHONE ENCOUNTER
Pt calling back with a progress update. Pt states she believes the bladder infection is gone but sometimes she does still feel pressure. Pt would like to know if she should submit another urine sample. Please advise.

## 2018-12-21 ENCOUNTER — APPOINTMENT (OUTPATIENT)
Dept: LAB | Age: 83
End: 2018-12-21
Attending: UROLOGY
Payer: MEDICARE

## 2018-12-21 DIAGNOSIS — R30.0 BURNING WITH URINATION: ICD-10-CM

## 2018-12-21 PROCEDURE — 87086 URINE CULTURE/COLONY COUNT: CPT

## 2018-12-21 NOTE — TELEPHONE ENCOUNTER
See below note. Orders placed for u/a and c&s. (please see previous t.e. As well- unfortunately phone room did not add to that call, but rather started this one) , please advise if pt should be treated while waiting for results. )Thank you.

## 2018-12-24 NOTE — TELEPHONE ENCOUNTER
Please call patient back; urine culture shows no infection. If patient still having urological complaints, please see if urology nurse practitioner Claudia Luo could either research chart or see patient.   Thanks, Dr. Milton Brown

## 2018-12-26 NOTE — TELEPHONE ENCOUNTER
Phoned pt and received her voice mail. Left detailed message stating 's reply as outlined below. Clinic call back number provided for any questions.

## 2019-01-04 ENCOUNTER — APPOINTMENT (OUTPATIENT)
Dept: LAB | Age: 84
End: 2019-01-04
Attending: UROLOGY
Payer: MEDICARE

## 2019-01-04 DIAGNOSIS — N39.0 RECURRENT UTI: ICD-10-CM

## 2019-01-04 PROCEDURE — 87077 CULTURE AEROBIC IDENTIFY: CPT

## 2019-01-04 PROCEDURE — 87086 URINE CULTURE/COLONY COUNT: CPT

## 2019-01-07 ENCOUNTER — OFFICE VISIT (OUTPATIENT)
Dept: HEMATOLOGY/ONCOLOGY | Facility: HOSPITAL | Age: 84
End: 2019-01-07
Attending: INTERNAL MEDICINE
Payer: MEDICARE

## 2019-01-07 ENCOUNTER — TELEPHONE (OUTPATIENT)
Dept: SURGERY | Facility: CLINIC | Age: 84
End: 2019-01-07

## 2019-01-07 VITALS
HEIGHT: 60 IN | RESPIRATION RATE: 16 BRPM | TEMPERATURE: 98 F | BODY MASS INDEX: 20.81 KG/M2 | HEART RATE: 75 BPM | DIASTOLIC BLOOD PRESSURE: 64 MMHG | SYSTOLIC BLOOD PRESSURE: 159 MMHG | WEIGHT: 106 LBS

## 2019-01-07 DIAGNOSIS — N39.0 RECURRENT URINARY TRACT INFECTION: ICD-10-CM

## 2019-01-07 DIAGNOSIS — Z12.39 SCREENING FOR BREAST CANCER: ICD-10-CM

## 2019-01-07 DIAGNOSIS — Z17.0 MALIGNANT NEOPLASM OF CENTRAL PORTION OF LEFT BREAST IN FEMALE, ESTROGEN RECEPTOR POSITIVE (HCC): Primary | ICD-10-CM

## 2019-01-07 DIAGNOSIS — F32.A ANXIETY AND DEPRESSION: ICD-10-CM

## 2019-01-07 DIAGNOSIS — C50.112 MALIGNANT NEOPLASM OF CENTRAL PORTION OF LEFT BREAST IN FEMALE, ESTROGEN RECEPTOR POSITIVE (HCC): Primary | ICD-10-CM

## 2019-01-07 DIAGNOSIS — H90.6 MIXED HEARING LOSS, BILATERAL: ICD-10-CM

## 2019-01-07 DIAGNOSIS — K21.9 GASTROESOPHAGEAL REFLUX DISEASE WITHOUT ESOPHAGITIS: ICD-10-CM

## 2019-01-07 DIAGNOSIS — C50.411 MALIGNANT NEOPLASM OF UPPER-OUTER QUADRANT OF RIGHT FEMALE BREAST, UNSPECIFIED ESTROGEN RECEPTOR STATUS (HCC): ICD-10-CM

## 2019-01-07 DIAGNOSIS — K58.0 IRRITABLE BOWEL SYNDROME WITH DIARRHEA: ICD-10-CM

## 2019-01-07 DIAGNOSIS — F41.9 ANXIETY AND DEPRESSION: ICD-10-CM

## 2019-01-07 PROCEDURE — 99214 OFFICE O/P EST MOD 30 MIN: CPT | Performed by: INTERNAL MEDICINE

## 2019-01-08 ENCOUNTER — TELEPHONE (OUTPATIENT)
Dept: SURGERY | Facility: CLINIC | Age: 84
End: 2019-01-08

## 2019-01-08 DIAGNOSIS — N39.0 RECURRENT UTI: Primary | ICD-10-CM

## 2019-01-08 RX ORDER — NITROFURANTOIN 25; 75 MG/1; MG/1
100 CAPSULE ORAL 2 TIMES DAILY
Qty: 10 CAPSULE | Refills: 0 | Status: SHIPPED | OUTPATIENT
Start: 2019-01-08 | End: 2019-01-11

## 2019-01-08 NOTE — TELEPHONE ENCOUNTER
Pt states she has been having UTI symptoms, states she had urine culture lab done on 1/4, states she is experiencing a lot of discomfort and would like rx. Pls call thank you.

## 2019-01-08 NOTE — TELEPHONE ENCOUNTER
Patient states on 1/4/19 she started to experience suprapubic pain and urinary frequency every 15 minutes . She submitted a urine culture, she has a standing order, and started Cipro x 5 days.       Patient states she has she has two more doses, tonight and

## 2019-01-08 NOTE — TELEPHONE ENCOUNTER
See message below. Phoned pt and spoke with her. She would like to know what her urine c&s results were because she states she has a uti. C/o burning with urination and discomfort \"down there. \" denies fever or visualizing blood in urine. states started [facility] AURE

## 2019-01-11 NOTE — TELEPHONE ENCOUNTER
Spoke with pt and informed her of ELISHA's msg and instructions below. Pt states that she did have symptoms and also had a temp of 100F wed., 1/9 but then has been normal since.  Pt also informed that she was prescribed the Cipro by ELISHA to take if she develops

## 2019-01-11 NOTE — TELEPHONE ENCOUNTER
Agree.  Likely contaminant. Only causes infection of known immuno-compromized patients. If she is on antibiotics, please have her stop and would have her followup with her PCP.   Thanks

## 2019-01-14 NOTE — TELEPHONE ENCOUNTER
Noted and I called pt to inform her that new standing orders for UA and C&S were placed in the system by [facility]. Pt was thankful.

## 2019-01-14 NOTE — PROGRESS NOTES
DELON Betancourt is a 80year old female with a history of bilateral breast cancer. Her first surgery was at [facility] for right breast microcalcifications.   Patient had a left lumpectomy and sentinel node sampling on 11/12/2003 for Bilateral hearing aids   Eyes: Positive for itching. Reading glasses   Respiratory: Negative for chest tightness and shortness of breath. Cardiovascular: Negative for chest pain and leg swelling.         Blood pressure at home this morning wa Per NG:Lumpectomy   • Essential hypertension    • Irritable bowel syndrome    • Recurrent urinary tract infection 1/7/2019   • S/P ear surgery     Per NG: Ear surgery X2   • Screening for breast cancer 1/7/2019   • Urinary tract infection 10/29/2013   • V • Breast Cancer Self 78         PHYSICAL EXAM:    /64 (BP Location: Right arm, Patient Position: Sitting, Cuff Size: adult)   Pulse 75   Temp 98 °F (36.7 °C) (Oral)   Resp 16   Ht 1.524 m (5')   Wt 48.1 kg (106 lb)   BMI 20.70 kg/m²     Physical Exam Recurrent urinary tract infection  Irritable bowel syndrome with diarrhea  Gastroesophageal reflux disease without esophagitis  Mixed hearing loss, bilateral     Patient does not have evidence of local recurrence or metastatic disease.   She will continue y

## 2019-01-25 ENCOUNTER — APPOINTMENT (OUTPATIENT)
Dept: LAB | Age: 84
End: 2019-01-25
Attending: UROLOGY
Payer: MEDICARE

## 2019-01-25 DIAGNOSIS — R30.0 BURNING WITH URINATION: ICD-10-CM

## 2019-01-25 LAB
BACTERIA UR QL AUTO: NEGATIVE /HPF
BILIRUB UR QL: NEGATIVE
COLOR UR: YELLOW
GLUCOSE UR-MCNC: NEGATIVE MG/DL
KETONES UR-MCNC: NEGATIVE MG/DL
NITRITE UR QL STRIP.AUTO: NEGATIVE
PH UR: 7 [PH] (ref 5–8)
PROT UR-MCNC: 30 MG/DL
RBC #/AREA URNS AUTO: 23 /HPF
SP GR UR STRIP: 1.01 (ref 1–1.03)
UROBILINOGEN UR STRIP-ACNC: <2
VIT C UR-MCNC: NEGATIVE MG/DL
WBC #/AREA URNS AUTO: 2861 /HPF

## 2019-01-25 PROCEDURE — 81001 URINALYSIS AUTO W/SCOPE: CPT

## 2019-01-25 PROCEDURE — 87086 URINE CULTURE/COLONY COUNT: CPT

## 2019-01-28 ENCOUNTER — TELEPHONE (OUTPATIENT)
Dept: SURGERY | Facility: CLINIC | Age: 84
End: 2019-01-28

## 2019-01-29 ENCOUNTER — TELEPHONE (OUTPATIENT)
Dept: SURGERY | Facility: CLINIC | Age: 84
End: 2019-01-29

## 2019-01-29 NOTE — TELEPHONE ENCOUNTER
Phoned pt back and spoke with her. She wants to know why she feels burning when she urinates, despite neg c&s(contaminent ). She is also describing oab. denies fever, denies abdominal pain or visualizing blood in urine.  Advised on appt for exam, to see if t

## 2019-01-29 NOTE — TELEPHONE ENCOUNTER
Called patient to notify her of urine culture results and stated that she is still having symptoms of dysuria,frequency and would like to know if she needs antibiotics

## 2019-02-01 ENCOUNTER — OFFICE VISIT (OUTPATIENT)
Dept: SURGERY | Facility: CLINIC | Age: 84
End: 2019-02-01
Payer: MEDICARE

## 2019-02-01 VITALS
HEART RATE: 86 BPM | DIASTOLIC BLOOD PRESSURE: 69 MMHG | BODY MASS INDEX: 20.62 KG/M2 | HEIGHT: 60 IN | TEMPERATURE: 98 F | SYSTOLIC BLOOD PRESSURE: 156 MMHG | WEIGHT: 105 LBS

## 2019-02-01 DIAGNOSIS — Z87.440 HISTORY OF UTI: ICD-10-CM

## 2019-02-01 DIAGNOSIS — R10.2 SUPRAPUBIC DISCOMFORT: ICD-10-CM

## 2019-02-01 DIAGNOSIS — R30.0 DYSURIA: Primary | ICD-10-CM

## 2019-02-01 DIAGNOSIS — R35.0 URINARY FREQUENCY: ICD-10-CM

## 2019-02-01 LAB
BACTERIA UR QL AUTO: NEGATIVE /HPF
BILIRUB UR QL: NEGATIVE
COLOR UR: YELLOW
GLUCOSE UR-MCNC: NEGATIVE MG/DL
KETONES UR-MCNC: NEGATIVE MG/DL
NITRITE UR QL STRIP.AUTO: NEGATIVE
PH UR: 6 [PH] (ref 5–8)
PROT UR-MCNC: NEGATIVE MG/DL
RBC #/AREA URNS AUTO: 4 /HPF
SP GR UR STRIP: 1.01 (ref 1–1.03)
UROBILINOGEN UR STRIP-ACNC: <2
VIT C UR-MCNC: NEGATIVE MG/DL
WBC #/AREA URNS AUTO: 1211 /HPF

## 2019-02-01 PROCEDURE — G0463 HOSPITAL OUTPT CLINIC VISIT: HCPCS | Performed by: NURSE PRACTITIONER

## 2019-02-01 PROCEDURE — 99213 OFFICE O/P EST LOW 20 MIN: CPT | Performed by: NURSE PRACTITIONER

## 2019-02-01 RX ORDER — CEFADROXIL 500 MG/1
500 CAPSULE ORAL 2 TIMES DAILY
Qty: 14 CAPSULE | Refills: 0 | Status: SHIPPED | OUTPATIENT
Start: 2019-02-01 | End: 2019-02-08

## 2019-02-01 NOTE — PROGRESS NOTES
HPI:    Patient ID: Madelaine Gil is a 80year old female. Patient is a 80year old female who presents to the clinic today with complaints of urinary frequency and dysuria.   Patient states symptoms started last Friday when she had urinary frequency depression 1/7/2018   • Arthritis    • Bilateral breast cancer ([facility] [address] [facility] 12/20/2013   • Breast cancer ([facility] 2003    Per NG:Lumpectomy   • Essential hypertension    • Irritable bowel syndrome    • Recurrent urinary tract infection 1/7/2019   • S/P ear surgery She requested to be strait catheterized to obtain sterile specimen. This was also valuable to make sure she was not retaining urine. Using sterile technique patient was strait catheterized with return of about 150 ml of cloudy yellow urine.   She tolerate

## 2019-02-04 ENCOUNTER — TELEPHONE (OUTPATIENT)
Dept: SURGERY | Facility: CLINIC | Age: 84
End: 2019-02-04

## 2019-02-04 NOTE — TELEPHONE ENCOUNTER
Patient contacted. Patient is aware of her test result, Isi NEGRETE's recommendation below and verbalized understanding and states she will comply.

## 2019-02-04 NOTE — TELEPHONE ENCOUNTER
----- Message from CHITRA Olivier sent at 2/4/2019  8:42 AM CST -----  Staff,    Please let patient know her urine culture was positive for e. Coli. It is sensitive to the antibiotic that I prescribed her.   She should complete the Keflex as pre

## 2019-02-12 ENCOUNTER — APPOINTMENT (OUTPATIENT)
Dept: LAB | Age: 84
End: 2019-02-12
Attending: NURSE PRACTITIONER
Payer: MEDICARE

## 2019-02-12 DIAGNOSIS — N39.0 RECURRENT UTI: ICD-10-CM

## 2019-02-12 LAB
BILIRUB UR QL: NEGATIVE
CLARITY UR: CLEAR
COLOR UR: YELLOW
GLUCOSE UR-MCNC: NEGATIVE MG/DL
HGB UR QL STRIP.AUTO: NEGATIVE
KETONES UR-MCNC: NEGATIVE MG/DL
NITRITE UR QL STRIP.AUTO: POSITIVE
PH UR: 6 [PH] (ref 5–8)
PROT UR-MCNC: NEGATIVE MG/DL
RBC #/AREA URNS AUTO: 1 /HPF
SP GR UR STRIP: 1.01 (ref 1–1.03)
UROBILINOGEN UR STRIP-ACNC: <2
VIT C UR-MCNC: NEGATIVE MG/DL
WBC #/AREA URNS AUTO: 115 /HPF

## 2019-02-12 PROCEDURE — 81001 URINALYSIS AUTO W/SCOPE: CPT

## 2019-02-12 PROCEDURE — 87086 URINE CULTURE/COLONY COUNT: CPT

## 2019-02-13 ENCOUNTER — TELEPHONE (OUTPATIENT)
Dept: SURGERY | Facility: CLINIC | Age: 84
End: 2019-02-13

## 2019-02-13 NOTE — TELEPHONE ENCOUNTER
Patient with UA suggestive of infection. Urine culture for some reason was cancelled, apparently due to wrong collection container. I called to see if her symptoms are improved on antibiotics.   IF improved please continue antibiotics and submit UA and ur

## 2019-02-13 NOTE — TELEPHONE ENCOUNTER
Spoke with pt and informed her of [facility]'s msg below and pt mery that she finished the ABX that were prescribed for the lat UTI on 2/1 and her symptoms got better but then they returned on 2/11 so she started taking the Cipro 250 mg that ELISHA had prescribed fo

## 2019-02-14 ENCOUNTER — TELEPHONE (OUTPATIENT)
Dept: SURGERY | Facility: CLINIC | Age: 84
End: 2019-02-14

## 2019-02-14 ENCOUNTER — OFFICE VISIT (OUTPATIENT)
Dept: SURGERY | Facility: CLINIC | Age: 84
End: 2019-02-14
Payer: MEDICARE

## 2019-02-14 VITALS — DIASTOLIC BLOOD PRESSURE: 72 MMHG | HEART RATE: 94 BPM | SYSTOLIC BLOOD PRESSURE: 112 MMHG | TEMPERATURE: 98 F

## 2019-02-14 DIAGNOSIS — R10.2 SUPRAPUBIC PAIN: ICD-10-CM

## 2019-02-14 DIAGNOSIS — R30.0 DYSURIA: ICD-10-CM

## 2019-02-14 DIAGNOSIS — R35.0 URINARY FREQUENCY: ICD-10-CM

## 2019-02-14 DIAGNOSIS — N39.0 RECURRENT UTI: Primary | ICD-10-CM

## 2019-02-14 PROCEDURE — 99212 OFFICE O/P EST SF 10 MIN: CPT | Performed by: NURSE PRACTITIONER

## 2019-02-14 PROCEDURE — A4351 STRAIGHT TIP URINE CATHETER: HCPCS | Performed by: NURSE PRACTITIONER

## 2019-02-14 PROCEDURE — G0463 HOSPITAL OUTPT CLINIC VISIT: HCPCS | Performed by: NURSE PRACTITIONER

## 2019-02-14 RX ORDER — CEPHALEXIN 250 MG/1
250 CAPSULE ORAL NIGHTLY
Qty: 90 CAPSULE | Refills: 0 | Status: SHIPPED | OUTPATIENT
Start: 2019-02-14 | End: 2019-06-06

## 2019-02-14 RX ORDER — CEPHALEXIN 500 MG/1
500 CAPSULE ORAL 2 TIMES DAILY
Qty: 20 CAPSULE | Refills: 0 | Status: SHIPPED | OUTPATIENT
Start: 2019-02-14 | End: 2019-02-24

## 2019-02-14 NOTE — TELEPHONE ENCOUNTER
Phoned pharmacy back and spoke with pharmacist, Lashanda Lu. She states she has already gotten  clarification from pt's  daughter.  ( per Bill Dill, pt is to take higher dose of cephalexin now, and to begin lower dose once higher dose is complete. )

## 2019-02-14 NOTE — TELEPHONE ENCOUNTER
Urine culture that she recently submitted was cancelled, it states it was collected in wrong specimen container. UA is positive for nitrates. She has taken cipro for 3 days with no relief of her symptoms.   I recommended she submit another urine culture a

## 2019-02-14 NOTE — PROGRESS NOTES
HPI:    Patient ID: Jim Abdi is a 80year old female. Patient is a 80year old female who presents to the office with complaints of dysuria. She was seen in the office 2/1/19 with complaints consistent with a UTI.   Her urine culture wa 2000 UNITS Oral Cap Take 1 capsule by mouth daily. Disp:  Rfl:    lisinopril (PRINIVIL,ZESTRIL) 10 MG Oral Tab Take 5 mg by mouth. Disp:  Rfl:    PARoxetine HCl (PAXIL) 10 MG Oral Tab Take  by mouth.  take 1 tablet (10MG)  by ORAL route  every day Disp: diagnosis)  Dysuria  Suprapubic pain  Urinary frequency    Patient presents to the clinic with complaints of dysuria, suprapubic pain, and urinary frequency. History of recurrent UTI.   She was strait catheterized and return of 120 ml of cloudy colored uri

## 2019-02-18 ENCOUNTER — TELEPHONE (OUTPATIENT)
Dept: SURGERY | Facility: CLINIC | Age: 84
End: 2019-02-18

## 2019-02-18 NOTE — TELEPHONE ENCOUNTER
Phoned pt and spoke with her. Read to her Apn's note as outlined below in this encounter, in it's entirety. She verbalized understanding and agrees to plan. She is thankful.

## 2019-02-18 NOTE — TELEPHONE ENCOUNTER
----- Message from CHITRA Omer sent at 2/18/2019  9:36 AM CST -----  Staff,    Please let patient know her urine culture is sensitive to the Keflex that I prescribed her.   She should finish this course of treatment then start the Keflex 250 m

## 2019-04-12 NOTE — PROGRESS NOTES
Lisa Taveras is a 80year old female. HPI:   Patient presents with:  UTI      49-year-old female with recurrent urinary tract infections most recently seen in the office January 12, 2017.   She saying she gets about one episode of UTI every 3-4 lucila daily. Disp:  Rfl:    lisinopril (PRINIVIL,ZESTRIL) 10 MG Oral Tab Take 5 mg by mouth. Disp:  Rfl:    PARoxetine HCl (PAXIL) 10 MG Oral Tab Take  by mouth.  take 1 tablet (10MG)  by ORAL route  every day Disp:  Rfl:        Allergies:    Sulfa Antibiotics ABDOMINAL PAIN

## 2019-06-06 ENCOUNTER — OFFICE VISIT (OUTPATIENT)
Dept: SURGERY | Facility: CLINIC | Age: 84
End: 2019-06-06
Payer: MEDICARE

## 2019-06-06 VITALS
SYSTOLIC BLOOD PRESSURE: 118 MMHG | WEIGHT: 105 LBS | HEART RATE: 83 BPM | DIASTOLIC BLOOD PRESSURE: 75 MMHG | BODY MASS INDEX: 21 KG/M2

## 2019-06-06 DIAGNOSIS — N39.0 RECURRENT UTI: Primary | ICD-10-CM

## 2019-06-06 PROCEDURE — 99213 OFFICE O/P EST LOW 20 MIN: CPT | Performed by: UROLOGY

## 2019-06-06 PROCEDURE — G0463 HOSPITAL OUTPT CLINIC VISIT: HCPCS | Performed by: UROLOGY

## 2019-06-06 NOTE — PROGRESS NOTES
Duane Gates is a 80year old female. HPI:   Patient presents with:   Follow - Up: patient presents for a f/u on uti      80year-old female with multiple urologic issues including recurrent urinary tract infections, large left-sided angiomyolipoma tobacco: Never Used    Alcohol use: No      Alcohol/week: 0.0 oz    Drug use: No       Medications (Active prior to today's visit):    Current Outpatient Medications:  Cranberry-Vitamin C-Vitamin E (CRANBERRY PLUS VITAMIN C) 4200-20-3 MG-MG-UNIT Oral Cap T

## 2019-07-31 ENCOUNTER — APPOINTMENT (OUTPATIENT)
Dept: LAB | Age: 84
End: 2019-07-31
Attending: UROLOGY
Payer: MEDICARE

## 2019-08-05 ENCOUNTER — TELEPHONE (OUTPATIENT)
Dept: SURGERY | Facility: CLINIC | Age: 84
End: 2019-08-05

## 2019-08-06 ENCOUNTER — HOSPITAL ENCOUNTER (EMERGENCY)
Facility: HOSPITAL | Age: 84
Discharge: HOME | End: 2019-08-06
Attending: EMERGENCY MEDICINE
Payer: MEDICARE

## 2019-08-06 ENCOUNTER — APPOINTMENT (OUTPATIENT)
Dept: GENERAL RADIOLOGY | Facility: HOSPITAL | Age: 84
End: 2019-08-06
Attending: EMERGENCY MEDICINE
Payer: MEDICARE

## 2019-08-06 VITALS
BODY MASS INDEX: 19.83 KG/M2 | OXYGEN SATURATION: 98 % | HEART RATE: 78 BPM | HEIGHT: 61 IN | RESPIRATION RATE: 18 BRPM | TEMPERATURE: 98 F | SYSTOLIC BLOOD PRESSURE: 129 MMHG | WEIGHT: 105 LBS | DIASTOLIC BLOOD PRESSURE: 79 MMHG

## 2019-08-06 DIAGNOSIS — S30.0XXA CONTUSION OF BUTTOCK, INITIAL ENCOUNTER: Primary | ICD-10-CM

## 2019-08-06 PROCEDURE — 73502 X-RAY EXAM HIP UNI 2-3 VIEWS: CPT | Performed by: EMERGENCY MEDICINE

## 2019-08-06 PROCEDURE — 99283 EMERGENCY DEPT VISIT LOW MDM: CPT

## 2019-08-06 NOTE — ED INITIAL ASSESSMENT (HPI)
Pt states she fell backwards on the carpet 2 weeks ago. Pt states she had bilateral buttock pain. L buttock has improved, but R buttock has worsened and radiates down the RLE. Pt states walking makes pain worse. Pt did hit head. No LOC.

## 2019-08-06 NOTE — ED NOTES
Patient arrives from home with complaints of L buttock pain s/p fall approximately 2 weeks ago. Patient states she was cleaning her blinds, where she stepped back and tripped over the carpet. Patient states she fell straight back. Denies LOC.  Patient state

## 2019-08-06 NOTE — TELEPHONE ENCOUNTER
Pt. Calling to get her lab results. Pt. States that she would like to get a call back today. Pt. Is requesting for RN to call her Emma Davidson, who is with the pt now in the hosp.

## 2019-08-06 NOTE — ED PROVIDER NOTES
Patient Seen in: [facility] AND [facility] Emergency Department    History   Patient presents with:  Fall (musculoskeletal, neurologic)    Stated Complaint: fall. HPI    Patient is complete here with complaint of hip pain.   Patient did fall down about 2 weeks by ORAL route  every day         Review of Systems  Constitutional: no fever, normal appetite, normal energy, has otherwise been feeling well      Positive for stated complaint: fall. Other systems are as noted in HPI.   Constitutional and vital signs revi Clinical Impression:  Contusion of buttock, initial encounter  (primary encounter diagnosis)    Disposition:  Discharge    Follow-up:  Prosper Alegria MD  [facility] [address]  [phone]    In 2 days  For re-check      Medicatio

## 2019-08-06 NOTE — TELEPHONE ENCOUNTER
Urine culture shows a staph species that usually not associated with urinary infection and is thought to be a contaminant. No need for antibiotics based on culture results. Urinalysis shows elevated WBC count but normal red cell count.   Is she having UTI

## 2019-08-07 NOTE — TELEPHONE ENCOUNTER
Spoke to patients daughter Mami Mom. States she is not currently having UTI symptoms. States she was having UTI symptoms and took one does of Cirpo that she had at home and then symptoms went away.      Advised patients daughter to call back if patient has new

## 2019-09-11 ENCOUNTER — OFFICE VISIT (OUTPATIENT)
Dept: AUDIOLOGY | Facility: CLINIC | Age: 84
End: 2019-09-11
Payer: MEDICARE

## 2019-09-11 DIAGNOSIS — H90.6 MIXED HEARING LOSS, BILATERAL: Primary | ICD-10-CM

## 2019-09-11 PROCEDURE — 92593 HEARING AID CHECK, BOTH EARS: CPT | Performed by: AUDIOLOGIST

## 2019-09-11 NOTE — PROGRESS NOTES
HEARING AID FOLLOW-UP    Magaly De Souza  3/2/1925  [id number]    Patient is here for annual hearing aid check. Hearing Aid Information:   Alonzo Reasonsivakumar CRT V  Right [id number]  Left [id number]  Coupled to custom c-shells.    Date dispensed:  8-17-1

## 2019-09-18 ENCOUNTER — TELEPHONE (OUTPATIENT)
Dept: AUDIOLOGY | Facility: CLINIC | Age: 84
End: 2019-09-18

## 2019-09-18 NOTE — TELEPHONE ENCOUNTER
Spoke with patient. She has noticed TV is now at 23 or 20 rather than 17 and feels like maybe the changes in volume that I made at last visit did not work. She would like to come in and have me check it.    She also is pushing c-shell in more to hear be

## 2019-09-18 NOTE — TELEPHONE ENCOUNTER
Dr. Luis Enrique Stone, please sign off on test results for Urine Culture performed on 7-31-19. Please see message below, thanks.

## 2019-09-18 NOTE — TELEPHONE ENCOUNTER
Pt states hearing aid volume was increased at last HOSPITAL [facility] app, but has not noticed improvement, requesting cb to discuss. Pls call thank you.

## 2019-10-02 ENCOUNTER — OFFICE VISIT (OUTPATIENT)
Dept: AUDIOLOGY | Facility: CLINIC | Age: 84
End: 2019-10-02
Payer: MEDICARE

## 2019-10-02 DIAGNOSIS — H90.3 SENSORINEURAL HEARING LOSS, BILATERAL: Primary | ICD-10-CM

## 2019-10-02 PROCEDURE — 92593 HEARING AID CHECK, BOTH EARS: CPT | Performed by: AUDIOLOGIST

## 2019-12-02 ENCOUNTER — HOSPITAL ENCOUNTER (OUTPATIENT)
Dept: MAMMOGRAPHY | Age: 84
Discharge: HOME | End: 2019-12-02
Attending: INTERNAL MEDICINE
Payer: MEDICARE

## 2019-12-02 DIAGNOSIS — C50.112 MALIGNANT NEOPLASM OF CENTRAL PORTION OF LEFT BREAST IN FEMALE, ESTROGEN RECEPTOR POSITIVE (HCC): ICD-10-CM

## 2019-12-02 DIAGNOSIS — Z17.0 MALIGNANT NEOPLASM OF CENTRAL PORTION OF LEFT BREAST IN FEMALE, ESTROGEN RECEPTOR POSITIVE (HCC): ICD-10-CM

## 2019-12-02 PROCEDURE — 77063 BREAST TOMOSYNTHESIS BI: CPT | Performed by: INTERNAL MEDICINE

## 2019-12-02 PROCEDURE — 77067 SCR MAMMO BI INCL CAD: CPT | Performed by: INTERNAL MEDICINE

## 2019-12-06 ENCOUNTER — OFFICE VISIT (OUTPATIENT)
Dept: SURGERY | Facility: CLINIC | Age: 84
End: 2019-12-06
Payer: MEDICARE

## 2019-12-06 VITALS
HEART RATE: 78 BPM | DIASTOLIC BLOOD PRESSURE: 74 MMHG | WEIGHT: 105 LBS | BODY MASS INDEX: 20 KG/M2 | SYSTOLIC BLOOD PRESSURE: 138 MMHG | TEMPERATURE: 98 F

## 2019-12-06 DIAGNOSIS — D17.71 RENAL ANGIOMYOLIPOMA: ICD-10-CM

## 2019-12-06 DIAGNOSIS — N39.0 RECURRENT UTI: Primary | ICD-10-CM

## 2019-12-06 PROCEDURE — 99213 OFFICE O/P EST LOW 20 MIN: CPT | Performed by: NURSE PRACTITIONER

## 2019-12-06 PROCEDURE — G0463 HOSPITAL OUTPT CLINIC VISIT: HCPCS | Performed by: NURSE PRACTITIONER

## 2019-12-06 NOTE — PROGRESS NOTES
HPI:    Patient ID: Maggie Altamirano is a 80year old female. HPI     Patient is a 80year old female who presents to the clinic for a follow up. Previously seen in the office 6/6/19 by Dr. Chidi Giron.   History of recurrent UTI and large left sided an [facility]) 2003    Per NG:Lumpectomy   • Essential hypertension    • Irritable bowel syndrome    • Recurrent urinary tract infection 1/7/2019   • S/P ear surgery     Per NG: Ear surgery X2   • Screening for breast cancer 1/7/2019   • Urinary tract infection 10/ feels that this problem is stable. She decides to observe this problem    Follow up in 1 year.          Orders Placed This Encounter      Urinalysis, Routine      Urine Culture, Routine      Meds This Visit:  Requested Prescriptions      No prescriptions r

## 2019-12-18 ENCOUNTER — TELEPHONE (OUTPATIENT)
Dept: HEMATOLOGY/ONCOLOGY | Facility: HOSPITAL | Age: 84
End: 2019-12-18

## 2019-12-18 DIAGNOSIS — D17.71 RENAL ANGIOMYOLIPOMA: ICD-10-CM

## 2019-12-18 DIAGNOSIS — N39.0 RECURRENT UTI: ICD-10-CM

## 2019-12-18 NOTE — TELEPHONE ENCOUNTER
Spoke with Erick Puri per Dr. Daniel Jones instruction-- let her know that her mammo was benign-- will see pt in follow up 1/13.

## 2020-01-02 ENCOUNTER — APPOINTMENT (OUTPATIENT)
Dept: LAB | Age: 85
End: 2020-01-02
Attending: NURSE PRACTITIONER
Payer: MEDICARE

## 2020-01-02 LAB
BACTERIA UR QL AUTO: NEGATIVE /HPF
BILIRUB UR QL: NEGATIVE
COLOR UR: YELLOW
GLUCOSE UR-MCNC: NEGATIVE MG/DL
KETONES UR-MCNC: NEGATIVE MG/DL
NITRITE UR QL STRIP.AUTO: POSITIVE
PH UR: 6 [PH] (ref 5–8)
PROT UR-MCNC: NEGATIVE MG/DL
RBC #/AREA URNS AUTO: 4 /HPF
SP GR UR STRIP: 1.01 (ref 1–1.03)
UROBILINOGEN UR STRIP-ACNC: <2
WBC #/AREA URNS AUTO: 1162 /HPF

## 2020-01-02 PROCEDURE — 81001 URINALYSIS AUTO W/SCOPE: CPT

## 2020-01-02 PROCEDURE — 87086 URINE CULTURE/COLONY COUNT: CPT

## 2020-01-06 ENCOUNTER — TELEPHONE (OUTPATIENT)
Dept: SURGERY | Facility: CLINIC | Age: 85
End: 2020-01-06

## 2020-01-06 RX ORDER — CEPHALEXIN 250 MG/1
250 TABLET ORAL 2 TIMES DAILY
Qty: 14 TABLET | Refills: 0 | Status: SHIPPED | OUTPATIENT
Start: 2020-01-06 | End: 2020-01-13

## 2020-01-06 NOTE — TELEPHONE ENCOUNTER
Patient submitted UA and urine culture. UA with positive nitrites, leuks, and WBc. Culture with no growth. I called and spoke with patient. She complains of dysuria, frequency, and urgency. I recommended Keflex 250 mg PO BID x 7 days.   If no improveme

## 2020-01-13 ENCOUNTER — OFFICE VISIT (OUTPATIENT)
Dept: HEMATOLOGY/ONCOLOGY | Facility: HOSPITAL | Age: 85
End: 2020-01-13
Attending: INTERNAL MEDICINE
Payer: MEDICARE

## 2020-01-13 VITALS
BODY MASS INDEX: 20.39 KG/M2 | RESPIRATION RATE: 16 BRPM | TEMPERATURE: 98 F | HEART RATE: 72 BPM | DIASTOLIC BLOOD PRESSURE: 74 MMHG | HEIGHT: 61 IN | SYSTOLIC BLOOD PRESSURE: 168 MMHG | WEIGHT: 108 LBS | OXYGEN SATURATION: 99 %

## 2020-01-13 DIAGNOSIS — F32.A ANXIETY AND DEPRESSION: ICD-10-CM

## 2020-01-13 DIAGNOSIS — N39.0 RECURRENT URINARY TRACT INFECTION: ICD-10-CM

## 2020-01-13 DIAGNOSIS — Z12.39 SCREENING FOR BREAST CANCER: ICD-10-CM

## 2020-01-13 DIAGNOSIS — C50.411 MALIGNANT NEOPLASM OF UPPER-OUTER QUADRANT OF RIGHT FEMALE BREAST, UNSPECIFIED ESTROGEN RECEPTOR STATUS (HCC): ICD-10-CM

## 2020-01-13 DIAGNOSIS — F41.9 ANXIETY AND DEPRESSION: ICD-10-CM

## 2020-01-13 DIAGNOSIS — Z17.0 MALIGNANT NEOPLASM OF CENTRAL PORTION OF LEFT BREAST IN FEMALE, ESTROGEN RECEPTOR POSITIVE (HCC): Primary | ICD-10-CM

## 2020-01-13 DIAGNOSIS — C50.112 MALIGNANT NEOPLASM OF CENTRAL PORTION OF LEFT BREAST IN FEMALE, ESTROGEN RECEPTOR POSITIVE (HCC): Primary | ICD-10-CM

## 2020-01-13 PROCEDURE — 99213 OFFICE O/P EST LOW 20 MIN: CPT | Performed by: INTERNAL MEDICINE

## 2020-01-13 NOTE — PROGRESS NOTES
HPI   1/13/2020   Mikel Whitney is a 80year old female with a history of bilateral breast cancer. Her first surgery was at [facility] for right breast micro-calcifications 1994.   Patient had a left lumpectomy and sentinel node sampling ULTRASOUND GUIDED LEFT BREAST ASPIRATION-FINE NEEDLE BIOPSY: benign findings      12/27/2004 - 12/2008 Hormone Therapy     Arimidex         Review of Systems:     Review of Systems   Constitutional: Negative for activity change, appetite change and fever • Cephalexin 250 MG Oral Tab Take 250 mg by mouth 2 (two) times daily for 7 days. 14 tablet 0   • Cranberry-Vitamin C-Vitamin E (CRANBERRY PLUS VITAMIN C) 4200-20-3 MG-MG-UNIT Oral Cap Take 1 capsule by mouth 2 (two) times daily.      • Cholecalciferol (VIT Sexual activity: Not on file    Lifestyle      Physical activity:        Days per week: Not on file        Minutes per session: Not on file      Stress: Not on file    Relationships      Social connections:        Talks on phone: Not on file        Get Eyes: Pupils are equal, round, and reactive to light. Conjunctivae and EOM are normal. No scleral icterus. Neck: Neck supple. Cardiovascular: Normal rate and normal heart sounds.    Pulmonary/Chest: Effort normal and breath sounds normal. No respiratory Patient has a history of frequent UTI's. Patient is taking in combination with cranberry she decrease the frequency. She is being followed by Dr. Paddy Linda. Her urine culture 1/4/2019 was positive for > 100,000 Micrococcus kristinae.   She is currently comp

## 2020-01-13 NOTE — PATIENT INSTRUCTIONS
Continue your healthy diet and exercise     Return in one year     Have a repeat mammogram in December 2020

## 2020-01-27 ENCOUNTER — APPOINTMENT (OUTPATIENT)
Dept: LAB | Age: 85
End: 2020-01-27
Attending: UROLOGY
Payer: MEDICARE

## 2020-01-27 DIAGNOSIS — D17.71 RENAL ANGIOMYOLIPOMA: ICD-10-CM

## 2020-01-27 DIAGNOSIS — N39.0 RECURRENT UTI: ICD-10-CM

## 2020-01-27 LAB
BACTERIA UR QL AUTO: NEGATIVE /HPF
BILIRUB UR QL: NEGATIVE
CLARITY UR: CLEAR
COLOR UR: YELLOW
GLUCOSE UR-MCNC: NEGATIVE MG/DL
HGB UR QL STRIP.AUTO: NEGATIVE
KETONES UR-MCNC: NEGATIVE MG/DL
NITRITE UR QL STRIP.AUTO: NEGATIVE
PH UR: 6 [PH] (ref 5–8)
PROT UR-MCNC: NEGATIVE MG/DL
RBC #/AREA URNS AUTO: 1 /HPF
SP GR UR STRIP: 1.02 (ref 1–1.03)
UROBILINOGEN UR STRIP-ACNC: <2
WBC #/AREA URNS AUTO: 4 /HPF

## 2020-01-27 PROCEDURE — 87086 URINE CULTURE/COLONY COUNT: CPT

## 2020-01-27 PROCEDURE — 81001 URINALYSIS AUTO W/SCOPE: CPT

## 2020-01-31 ENCOUNTER — TELEPHONE (OUTPATIENT)
Dept: SURGERY | Facility: CLINIC | Age: 85
End: 2020-01-31

## 2020-01-31 NOTE — TELEPHONE ENCOUNTER
Pt called stating pt dropped off a urine sample on 1-27-20 at the [facility]. Have you received the results.   Please call pt

## 2020-02-03 NOTE — TELEPHONE ENCOUNTER
Notes recorded by CHITRA Purdy on 1/30/2020 at 6:46 PM CST  Please let patient know her urine culture is negative for any significant bacteria. Mary Marino due to contamination.  If she is having persistent symptoms then repeat urine culture. Genevieve Stover

## 2020-02-03 NOTE — TELEPHONE ENCOUNTER
Patient contacted (Name and  of pt verified). All results and recommendations reviewed. Patient verbalizes understanding, denies further questions and agrees with plan of care. She will monitor symptoms for the next several weeks.  Will submit culture if

## 2020-02-26 ENCOUNTER — HOSPITAL ENCOUNTER (OUTPATIENT)
Age: 85
Discharge: HOME | End: 2020-02-26
Attending: EMERGENCY MEDICINE
Payer: MEDICARE

## 2020-02-26 ENCOUNTER — APPOINTMENT (OUTPATIENT)
Dept: GENERAL RADIOLOGY | Age: 85
End: 2020-02-26
Attending: EMERGENCY MEDICINE
Payer: MEDICARE

## 2020-02-26 DIAGNOSIS — J06.9 VIRAL UPPER RESPIRATORY TRACT INFECTION: Primary | ICD-10-CM

## 2020-02-26 LAB
POCT INFLUENZA A: NEGATIVE
POCT INFLUENZA B: NEGATIVE
S PYO AG THROAT QL: NEGATIVE

## 2020-02-26 PROCEDURE — 87430 STREP A AG IA: CPT

## 2020-02-26 PROCEDURE — 71046 X-RAY EXAM CHEST 2 VIEWS: CPT | Performed by: EMERGENCY MEDICINE

## 2020-02-26 PROCEDURE — 99213 OFFICE O/P EST LOW 20 MIN: CPT

## 2020-02-26 PROCEDURE — 87502 INFLUENZA DNA AMP PROBE: CPT | Performed by: EMERGENCY MEDICINE

## 2020-02-26 NOTE — ED PROVIDER NOTES
Patient Seen in: [facility] In [address] [place] [address]    History   Patient presents with:  Sore Throat    Stated Complaint: sorethroat/cold sx    HPI    Patient here with cough, congestion for 1 days. No travel, no known sick contacts.   Patient Systems    Positive for stated complaint: sorethroat/cold sx  Other systems are as noted in HPI. Constitutional and vital signs reviewed. All other systems reviewed and negative except as noted above.     PSFH elements reviewed from today and agreed e

## 2020-05-20 ENCOUNTER — APPOINTMENT (OUTPATIENT)
Dept: LAB | Age: 85
End: 2020-05-20
Attending: UROLOGY
Payer: MEDICARE

## 2020-05-22 ENCOUNTER — TELEPHONE (OUTPATIENT)
Dept: SURGERY | Facility: CLINIC | Age: 85
End: 2020-05-22

## 2020-05-22 DIAGNOSIS — N30.01 ACUTE CYSTITIS WITH HEMATURIA: Primary | ICD-10-CM

## 2020-05-22 RX ORDER — CEFADROXIL 500 MG/1
500 CAPSULE ORAL 2 TIMES DAILY
Qty: 14 CAPSULE | Refills: 0 | Status: SHIPPED | OUTPATIENT
Start: 2020-05-22 | End: 2020-05-29

## 2020-05-22 NOTE — TELEPHONE ENCOUNTER
Patient contacted. Patient is aware of her test results, medications sent and verbalized understanding. All questions answered.

## 2020-05-22 NOTE — TELEPHONE ENCOUNTER
Pt returning call states very uncomfortable and I have to go out and would like to get the medication if needed please advise

## 2020-05-22 NOTE — TELEPHONE ENCOUNTER
----- Message from CHITRA Raygoza sent at 5/22/2020  9:23 AM CDT -----  Patient with positive urine culture. She has a standing order for UA and urine culture. I will send Duricef 500 mg PO BID x 7 days to her pharmacy.   IF symptoms do not imp

## 2020-05-22 NOTE — TELEPHONE ENCOUNTER
Pt calling for a refill on the following medication listed below please advise       Cefadroxil 500 MG Oral Cap 14 capsule 0 5/22/2020 5/29/2020   Sig:   Take 1 capsule (500 mg total) by mouth 2 (two) times daily for 7 days.      Route:   Oral     Order #:

## 2020-08-13 ENCOUNTER — OFFICE VISIT (OUTPATIENT)
Dept: AUDIOLOGY | Facility: CLINIC | Age: 85
End: 2020-08-13
Payer: MEDICARE

## 2020-08-13 DIAGNOSIS — H90.3 SENSORINEURAL HEARING LOSS, BILATERAL: Primary | ICD-10-CM

## 2020-08-13 PROCEDURE — 92593 HEARING AID CHECK, BOTH EARS: CPT | Performed by: AUDIOLOGIST

## 2020-08-13 PROCEDURE — V5266 BATTERY FOR HEARING DEVICE: HCPCS | Performed by: AUDIOLOGIST

## 2020-08-13 PROCEDURE — V5267 HEARING AID SUP/ACCESS/DEV: HCPCS | Performed by: AUDIOLOGIST

## 2020-08-13 NOTE — PROGRESS NOTES
HEARING AID FOLLOW-UP    [facility]  3/2/1925  [id number]    Patient is here for an annual.    Hearing Aid Information:   Aby Serna CRT V  Right #[id number]  Left #[id number]  Coupled to custom c-shells.    Date dispensed:  8-17-12  Battery:  28

## 2020-11-19 NOTE — TELEPHONE ENCOUNTER
Patient checking status on message. denies difficulty swallowing/chewing; no GI distress noted, LBM 11/18/other (specify)

## 2020-11-27 ENCOUNTER — LAB ENCOUNTER (OUTPATIENT)
Dept: LAB | Age: 85
End: 2020-11-27
Attending: NURSE PRACTITIONER
Payer: MEDICARE

## 2020-11-27 DIAGNOSIS — D17.71 RENAL ANGIOMYOLIPOMA: ICD-10-CM

## 2020-11-27 DIAGNOSIS — N39.0 RECURRENT UTI: ICD-10-CM

## 2020-11-27 PROCEDURE — 87077 CULTURE AEROBIC IDENTIFY: CPT

## 2020-11-27 PROCEDURE — 87086 URINE CULTURE/COLONY COUNT: CPT

## 2020-11-27 PROCEDURE — 87186 SC STD MICRODIL/AGAR DIL: CPT

## 2020-11-27 PROCEDURE — 81001 URINALYSIS AUTO W/SCOPE: CPT

## 2020-11-30 ENCOUNTER — TELEPHONE (OUTPATIENT)
Dept: SURGERY | Facility: CLINIC | Age: 85
End: 2020-11-30

## 2020-11-30 RX ORDER — CEFDINIR 300 MG/1
300 CAPSULE ORAL 2 TIMES DAILY
Qty: 14 CAPSULE | Refills: 0 | Status: SHIPPED | OUTPATIENT
Start: 2020-11-30 | End: 2020-12-07

## 2020-11-30 NOTE — TELEPHONE ENCOUNTER
Pt states she is miserable and asking if her test came back, if so, can RH prescribe medication.  Please advise

## 2020-11-30 NOTE — TELEPHONE ENCOUNTER
Patient with positive urine culture. I have attempted to call several times however I get a busy tone and phone does not ring. I have sent cefdinir 300 mg PO BID x7 days to her pharmacy. Please let her know. I hope she is feeling better soon.     Than

## 2020-12-03 ENCOUNTER — HOSPITAL ENCOUNTER (OUTPATIENT)
Dept: MAMMOGRAPHY | Age: 85
Discharge: HOME | End: 2020-12-03
Attending: INTERNAL MEDICINE
Payer: MEDICARE

## 2020-12-03 DIAGNOSIS — Z17.0 MALIGNANT NEOPLASM OF CENTRAL PORTION OF LEFT BREAST IN FEMALE, ESTROGEN RECEPTOR POSITIVE (HCC): ICD-10-CM

## 2020-12-03 DIAGNOSIS — C50.411 MALIGNANT NEOPLASM OF UPPER-OUTER QUADRANT OF RIGHT FEMALE BREAST, UNSPECIFIED ESTROGEN RECEPTOR STATUS (HCC): ICD-10-CM

## 2020-12-03 DIAGNOSIS — C50.112 MALIGNANT NEOPLASM OF CENTRAL PORTION OF LEFT BREAST IN FEMALE, ESTROGEN RECEPTOR POSITIVE (HCC): ICD-10-CM

## 2020-12-03 PROCEDURE — 77063 BREAST TOMOSYNTHESIS BI: CPT | Performed by: INTERNAL MEDICINE

## 2020-12-03 PROCEDURE — 77067 SCR MAMMO BI INCL CAD: CPT | Performed by: INTERNAL MEDICINE

## 2020-12-04 ENCOUNTER — TELEPHONE (OUTPATIENT)
Dept: HEMATOLOGY/ONCOLOGY | Facility: HOSPITAL | Age: 85
End: 2020-12-04

## 2020-12-11 ENCOUNTER — TELEPHONE (OUTPATIENT)
Dept: SURGERY | Facility: CLINIC | Age: 85
End: 2020-12-11

## 2020-12-11 ENCOUNTER — LAB ENCOUNTER (OUTPATIENT)
Dept: LAB | Age: 85
End: 2020-12-11
Attending: UROLOGY
Payer: MEDICARE

## 2020-12-11 DIAGNOSIS — R30.0 DYSURIA: Primary | ICD-10-CM

## 2020-12-11 PROCEDURE — 87077 CULTURE AEROBIC IDENTIFY: CPT | Performed by: NURSE PRACTITIONER

## 2020-12-11 PROCEDURE — 81001 URINALYSIS AUTO W/SCOPE: CPT | Performed by: NURSE PRACTITIONER

## 2020-12-11 PROCEDURE — 87086 URINE CULTURE/COLONY COUNT: CPT | Performed by: NURSE PRACTITIONER

## 2020-12-11 PROCEDURE — 87186 SC STD MICRODIL/AGAR DIL: CPT | Performed by: NURSE PRACTITIONER

## 2020-12-11 NOTE — TELEPHONE ENCOUNTER
Pt was treated for a bladder infection. Pt finished the medication on 12-7-20.  12-10-20 pt had frequent urination and pain. Pt had her daughter take a urine sample to the lab on 12-11-20 but was told no standing order on file.  Can pt get an order for ur

## 2020-12-11 NOTE — TELEPHONE ENCOUNTER
S/W pt and determined that she was having urinary frequency and urgency all night last night and burning pain with urination despite just finishing a course of ABX. Denies cloudy foul smelling urine, hematuria or fever and chills.  I told pt that I will maida

## 2020-12-14 ENCOUNTER — TELEPHONE (OUTPATIENT)
Dept: SURGERY | Facility: CLINIC | Age: 85
End: 2020-12-14

## 2020-12-14 DIAGNOSIS — N39.0 RECURRENT UTI: Primary | ICD-10-CM

## 2020-12-14 RX ORDER — CIPROFLOXACIN 250 MG/1
250 TABLET, FILM COATED ORAL 2 TIMES DAILY
Qty: 14 TABLET | Refills: 0 | Status: SHIPPED | OUTPATIENT
Start: 2020-12-14 | End: 2020-12-17 | Stop reason: ALTCHOICE

## 2020-12-14 NOTE — TELEPHONE ENCOUNTER
Pt calling for results. Pt had to cancel her appt today with RH due to the bladder pain and very frequent urination. Pt asking to please call as soon as possible with results so she can start treatment.  Please advise

## 2020-12-14 NOTE — TELEPHONE ENCOUNTER
Called patient and discussed urine uclture results. She has already picked up Cipro and started medication. Will take until complete. She agreed to schedule follow up in the next 1-2 months.

## 2020-12-17 ENCOUNTER — TELEPHONE (OUTPATIENT)
Dept: SURGERY | Facility: CLINIC | Age: 85
End: 2020-12-17

## 2020-12-17 RX ORDER — CEFDINIR 300 MG/1
300 CAPSULE ORAL 2 TIMES DAILY
Qty: 10 CAPSULE | Refills: 0 | Status: SHIPPED | OUTPATIENT
Start: 2020-12-17 | End: 2020-12-22

## 2020-12-17 NOTE — TELEPHONE ENCOUNTER
Patient states she has been taking Cipro 250 mg BID for past 3 1/2 days for \"bladder infection\". Patient calling to report that her symptoms are somewhat improved, but still having burning and pressure. State only urgency symptom has improved.      Natalie

## 2020-12-17 NOTE — TELEPHONE ENCOUNTER
Will switch to Cefdinir 300 mg by mouth twice a day for 5 days however if her symptoms do not improve, worsen, or she develops fevers she should go to e ER as this can be a sign of worsening infection.

## 2020-12-17 NOTE — TELEPHONE ENCOUNTER
Patient contacted. Kim BUENROSTRO's recommendations/orders below to the patient. Patient is aware to stop cipro and start cefdinir. Patient verbalized understanding to all Megha's recommendations below and verbalized understanding.

## 2020-12-29 ENCOUNTER — OFFICE VISIT (OUTPATIENT)
Dept: SURGERY | Facility: CLINIC | Age: 85
End: 2020-12-29
Payer: MEDICARE

## 2020-12-29 VITALS
HEART RATE: 83 BPM | WEIGHT: 108 LBS | DIASTOLIC BLOOD PRESSURE: 78 MMHG | BODY MASS INDEX: 20 KG/M2 | SYSTOLIC BLOOD PRESSURE: 130 MMHG

## 2020-12-29 DIAGNOSIS — N39.0 RECURRENT UTI: Primary | ICD-10-CM

## 2020-12-29 DIAGNOSIS — D17.71 ANGIOMYOLIPOMA OF KIDNEY: ICD-10-CM

## 2020-12-29 PROCEDURE — 99213 OFFICE O/P EST LOW 20 MIN: CPT | Performed by: NURSE PRACTITIONER

## 2020-12-29 PROCEDURE — G0463 HOSPITAL OUTPT CLINIC VISIT: HCPCS | Performed by: NURSE PRACTITIONER

## 2020-12-29 NOTE — PROGRESS NOTES
HPI:    Patient ID: Joanie Justin is a 80year old female. HPI     Patient is a 80year old female who presents to the clinic for a follow up. Previously seen in the office 12/6/19. History of recurrent UTI and large left sided angiomyolipoma. tablet (10MG)  by ORAL route  every day       Allergies:  Sulfa Antibiotics       DIARRHEA    HISTORY:  Past Medical History:   Diagnosis Date   • Acid reflux    • Anxiety and depression 1/7/2018   • Arthritis    • Bilateral breast cancer ([facility] 12/20/2013 diagnosis)  Angiomyolipoma of kidney    Recurrent UTI  No symptoms at this time. Patient with 3 symptomatic UTI in the past year, confirmed with positive urine culture. Symptoms resolved with appropriate antibiotic treatment.   I recommended she continue

## 2021-01-07 ENCOUNTER — OFFICE VISIT (OUTPATIENT)
Dept: HEMATOLOGY/ONCOLOGY | Facility: HOSPITAL | Age: 86
End: 2021-01-07
Attending: INTERNAL MEDICINE
Payer: MEDICARE

## 2021-01-07 VITALS
DIASTOLIC BLOOD PRESSURE: 80 MMHG | RESPIRATION RATE: 16 BRPM | WEIGHT: 106 LBS | HEART RATE: 84 BPM | BODY MASS INDEX: 20.81 KG/M2 | SYSTOLIC BLOOD PRESSURE: 143 MMHG | TEMPERATURE: 99 F | OXYGEN SATURATION: 95 % | HEIGHT: 60 IN

## 2021-01-07 DIAGNOSIS — C50.112 MALIGNANT NEOPLASM OF CENTRAL PORTION OF LEFT BREAST IN FEMALE, ESTROGEN RECEPTOR POSITIVE (HCC): Primary | ICD-10-CM

## 2021-01-07 DIAGNOSIS — C50.411 MALIGNANT NEOPLASM OF UPPER-OUTER QUADRANT OF RIGHT FEMALE BREAST, UNSPECIFIED ESTROGEN RECEPTOR STATUS (HCC): ICD-10-CM

## 2021-01-07 DIAGNOSIS — K21.9 GASTROESOPHAGEAL REFLUX DISEASE, UNSPECIFIED WHETHER ESOPHAGITIS PRESENT: ICD-10-CM

## 2021-01-07 DIAGNOSIS — F32.A ANXIETY AND DEPRESSION: ICD-10-CM

## 2021-01-07 DIAGNOSIS — I10 ESSENTIAL HYPERTENSION: ICD-10-CM

## 2021-01-07 DIAGNOSIS — Z12.31 ENCOUNTER FOR SCREENING MAMMOGRAM FOR MALIGNANT NEOPLASM OF BREAST: ICD-10-CM

## 2021-01-07 DIAGNOSIS — D04.30 SQUAMOUS CELL CARCINOMA IN SITU (SCCIS) OF SKIN OF FACE: ICD-10-CM

## 2021-01-07 DIAGNOSIS — Z17.0 MALIGNANT NEOPLASM OF CENTRAL PORTION OF LEFT BREAST IN FEMALE, ESTROGEN RECEPTOR POSITIVE (HCC): Primary | ICD-10-CM

## 2021-01-07 DIAGNOSIS — F41.9 ANXIETY AND DEPRESSION: ICD-10-CM

## 2021-01-07 PROCEDURE — 99214 OFFICE O/P EST MOD 30 MIN: CPT | Performed by: INTERNAL MEDICINE

## 2021-01-07 RX ORDER — AMLODIPINE BESYLATE 2.5 MG/1
TABLET ORAL
COMMUNITY
Start: 2020-06-23

## 2021-01-07 NOTE — PROGRESS NOTES
HPI   1/7/2021   Malgorzata Camacoh is a 80year old female with a history of bilateral breast cancer. Her first surgery was at [facility] for right breast micro-calcifications 1994.   Patient had a left lumpectomy and sentinel node sampling o 96%, IL 80%, HER-2/jeane negative. 11/12/2003 Surgery    LEFT BREAST LUMPECTOMY WITH SENTINEL NODE BIOPSY FOLLOWED WITH LEFT AXILLARY NODE SAMPLING:  no evidence of residual tumor. [place] lymph nodes negative for malignancy.      11/12/2003 Cancer Stag then needs to sit down    Neurological: Negative for numbness. Patient complains of tightness of over the top of her head and saw Dr. Tarun Sanchez - secondary to tension   No medications needed    Hematological: Negative for adenopathy.    Psychiatric/Beha on file        Inability: Not on file      Transportation needs        Medical: Not on file        Non-medical: Not on file    Tobacco Use      Smoking status: Never Smoker      Smokeless tobacco: Never Used    Substance and Sexual Activity      Alcohol us Ht 1.524 m (5')   Wt 48.1 kg (106 lb)   SpO2 95%   BMI 20.70 kg/m²     Physical Exam   Constitutional: She is oriented to person, place, and time. She appears well-developed and well-nourished. thin   HENT:   Head: Normocephalic and atraumatic.    Mouth/ disease, unspecified whether esophagitis present     Patient does not have evidence of local recurrence or metastatic disease. She will continue yearly mammograms. Fortunately she remains quite active and is enjoying life.   The focus of care is to [facility] recommended.    BI-RADS CATEGORY:     DIAGNOSTIC CATEGORY 2--BENIGN FINDING:     RECOMMENDATIONS:   ROUTINE MAMMOGRAM AND CLINICAL EVALUATION IN 12 MONTHS.    ======================================================================  12/2/2019 [facility] 2D 3D

## 2021-01-10 PROBLEM — C44.310 BASAL CELL CARCINOMA (BCC) OF SKIN OF FACE: Status: ACTIVE | Noted: 2021-01-10

## 2021-02-02 DIAGNOSIS — Z23 NEED FOR VACCINATION: ICD-10-CM

## 2021-03-09 ENCOUNTER — LAB ENCOUNTER (OUTPATIENT)
Dept: LAB | Age: 86
End: 2021-03-09
Attending: NURSE PRACTITIONER
Payer: MEDICARE

## 2021-03-09 DIAGNOSIS — N39.0 RECURRENT UTI: ICD-10-CM

## 2021-03-09 LAB
BILIRUB UR QL: NEGATIVE
COLOR UR: YELLOW
GLUCOSE UR-MCNC: NEGATIVE MG/DL
HGB UR QL STRIP.AUTO: NEGATIVE
KETONES UR-MCNC: NEGATIVE MG/DL
NITRITE UR QL STRIP.AUTO: POSITIVE
PH UR: 7 [PH] (ref 5–8)
PROT UR-MCNC: NEGATIVE MG/DL
RBC #/AREA URNS AUTO: >10 /HPF
SP GR UR STRIP: 1.01 (ref 1–1.03)
UROBILINOGEN UR STRIP-ACNC: <2
WBC #/AREA URNS AUTO: >50 /HPF
WBC CLUMPS UR QL AUTO: PRESENT /HPF

## 2021-03-09 PROCEDURE — 87077 CULTURE AEROBIC IDENTIFY: CPT

## 2021-03-09 PROCEDURE — 87086 URINE CULTURE/COLONY COUNT: CPT

## 2021-03-09 PROCEDURE — 81001 URINALYSIS AUTO W/SCOPE: CPT

## 2021-03-11 ENCOUNTER — TELEPHONE (OUTPATIENT)
Dept: SURGERY | Facility: CLINIC | Age: 86
End: 2021-03-11

## 2021-03-11 NOTE — TELEPHONE ENCOUNTER
----- Message from CHITRA Kohli sent at 3/11/2021  1:43 PM CST -----  Urine culture positive for staph species. Will send amoxicillin 500 mg by mouth twice a day for 7 days. Please notify patient.

## 2021-03-11 NOTE — TELEPHONE ENCOUNTER
S/w pt and informed her of results and instructions below and she verbalized understanding and compliance.

## 2021-03-11 NOTE — TELEPHONE ENCOUNTER
S/W pt and informed her of [facility]'s results msg as stated below and pt verbalized understanding and compliance.        Result Care Coordination    Result Notes   CHITRA Stacy   3/11/2021  1:43 PM CST Back to Top      Urine culture positive for st

## 2021-03-20 ENCOUNTER — LAB ENCOUNTER (OUTPATIENT)
Dept: LAB | Age: 86
End: 2021-03-20
Attending: NURSE PRACTITIONER
Payer: MEDICARE

## 2021-03-20 PROCEDURE — 87186 SC STD MICRODIL/AGAR DIL: CPT | Performed by: NURSE PRACTITIONER

## 2021-03-20 PROCEDURE — 87077 CULTURE AEROBIC IDENTIFY: CPT | Performed by: NURSE PRACTITIONER

## 2021-03-20 PROCEDURE — 81001 URINALYSIS AUTO W/SCOPE: CPT | Performed by: NURSE PRACTITIONER

## 2021-03-20 PROCEDURE — 87086 URINE CULTURE/COLONY COUNT: CPT | Performed by: NURSE PRACTITIONER

## 2021-03-22 ENCOUNTER — TELEPHONE (OUTPATIENT)
Dept: SURGERY | Facility: CLINIC | Age: 86
End: 2021-03-22

## 2021-03-22 NOTE — TELEPHONE ENCOUNTER
Called and discussed results with patient. She was recently treated for UTI and states day after completing treatment she developed dysuria. No fevers, chills, or gross hematuria. She is taking cranberry supplements OTC.   We agreed on cefdinir 300 mg PO

## 2021-04-06 ENCOUNTER — OFFICE VISIT (OUTPATIENT)
Dept: AUDIOLOGY | Facility: CLINIC | Age: 86
End: 2021-04-06
Payer: MEDICARE

## 2021-04-06 ENCOUNTER — OFFICE VISIT (OUTPATIENT)
Dept: OTOLARYNGOLOGY | Facility: CLINIC | Age: 86
End: 2021-04-06
Payer: MEDICARE

## 2021-04-06 VITALS
WEIGHT: 105 LBS | BODY MASS INDEX: 20.62 KG/M2 | DIASTOLIC BLOOD PRESSURE: 80 MMHG | TEMPERATURE: 97 F | HEIGHT: 60 IN | SYSTOLIC BLOOD PRESSURE: 149 MMHG

## 2021-04-06 DIAGNOSIS — H91.91 HEARING LOSS OF RIGHT EAR, UNSPECIFIED HEARING LOSS TYPE: Primary | ICD-10-CM

## 2021-04-06 DIAGNOSIS — H90.6 MIXED HEARING LOSS, BILATERAL: Primary | ICD-10-CM

## 2021-04-06 DIAGNOSIS — H61.23 BILATERAL IMPACTED CERUMEN: ICD-10-CM

## 2021-04-06 PROCEDURE — 92593 HEARING AID CHECK, BOTH EARS: CPT | Performed by: AUDIOLOGIST

## 2021-04-06 PROCEDURE — 69210 REMOVE IMPACTED EAR WAX UNI: CPT | Performed by: OTOLARYNGOLOGY

## 2021-04-06 RX ORDER — MONTELUKAST SODIUM 10 MG/1
10 TABLET ORAL NIGHTLY
Qty: 30 TABLET | Refills: 3 | Status: SHIPPED | OUTPATIENT
Start: 2021-04-06

## 2021-04-06 RX ORDER — LORATADINE 10 MG/1
10 TABLET ORAL DAILY
Qty: 30 TABLET | Refills: 3 | Status: SHIPPED | OUTPATIENT
Start: 2021-04-06

## 2021-04-06 NOTE — PROGRESS NOTES
Magnus Bowie is a 80year old female. Patient presents with:  Ear Problem: pt c/o right ear hearing loss, and pt states might have some wax on ears. Post Nasal Drip: for couple of months, no bleeding.        HISTORY OF PRESENT ILLNESS    Patient pres Maternal Cousin Female 80       Past Medical History:   Diagnosis Date   • Acid reflux    • Anxiety and depression 1/7/2018   • Arthritis    • Basal cell carcinoma (BCC) of skin of face 1/10/2021    Forehead, right inferior central (biopsy by shave method) Head/Face Normal Facial features - Normal. Eyebrows - Normal. Skull - Normal.             Ears Normal Inspection - Right: Normal, Left: Normal. Canal - Right: Normal, Left: Normal. TM - Right: Normal, Left: Normal.   Skin Normal Inspection - No microscopy. I have asked the patient to return to see me as needed for repeat cerumen removal in the future. Abel Stevens.  Anne Coronel MD    4/6/2021    3:34 PM

## 2021-04-07 ENCOUNTER — TELEPHONE (OUTPATIENT)
Dept: AUDIOLOGY | Facility: CLINIC | Age: 86
End: 2021-04-07

## 2021-04-07 NOTE — TELEPHONE ENCOUNTER
Spoke to patient. Aid has been intermittent today, she does not suspect battery. It went off then back on a few times. Ready to proceed with new hearing aids. Scheduled for Friday at 1pm for HT and earmold impressions.

## 2021-04-07 NOTE — TELEPHONE ENCOUNTER
Pt called stating pt had an appointment yesterday 4-6-21. Pt's hearing aid has been working on and off. Pt is requesting to speak to Dr. Romero Cuevas.   Please call

## 2021-04-07 NOTE — PROGRESS NOTES
HEARING AID FOLLOW-UP    Wu Montana  3/2/1925  [id number]    Patient is here for annual hearing aid check. Hearing Aid Information:   [employer] CRT V  Right [id number]  Left [id number]  Coupled to custom c-shells.    Date dispensed:  8-17-1

## 2021-04-09 ENCOUNTER — OFFICE VISIT (OUTPATIENT)
Dept: AUDIOLOGY | Facility: CLINIC | Age: 86
End: 2021-04-09
Payer: MEDICARE

## 2021-04-09 DIAGNOSIS — H90.3 SENSORINEURAL HEARING LOSS, BILATERAL: Primary | ICD-10-CM

## 2021-04-09 PROCEDURE — 92593 HEARING AID CHECK, BOTH EARS: CPT | Performed by: AUDIOLOGIST

## 2021-04-09 PROCEDURE — 92557 COMPREHENSIVE HEARING TEST: CPT | Performed by: AUDIOLOGIST

## 2021-04-09 NOTE — PROGRESS NOTES
AUDIOLOGY REPORT      Brittni Betancourt is a 80year old female     Referring Provider: Kvng Castellanos   YOB: 1925  Medical Record: [id number]      Patient Hearing History:   Patient is here for hearing testing to determine if she has ha new hearing aids. Lolly Wolly Doodleeo P50 R   Color P5  UP receivers and custom c-shells. #1 wire. Hearing aids were ordered and HAF scheduled for April 28. Sanju Mckeon   Audiologist

## 2021-04-14 ENCOUNTER — APPOINTMENT (OUTPATIENT)
Dept: GENERAL RADIOLOGY | Age: 86
End: 2021-04-14
Attending: NURSE PRACTITIONER
Payer: MEDICARE

## 2021-04-14 ENCOUNTER — HOSPITAL ENCOUNTER (OUTPATIENT)
Age: 86
Discharge: HOME | End: 2021-04-14
Payer: MEDICARE

## 2021-04-14 VITALS
TEMPERATURE: 98 F | WEIGHT: 105 LBS | HEART RATE: 94 BPM | SYSTOLIC BLOOD PRESSURE: 151 MMHG | BODY MASS INDEX: 20.62 KG/M2 | RESPIRATION RATE: 16 BRPM | OXYGEN SATURATION: 100 % | HEIGHT: 60 IN | DIASTOLIC BLOOD PRESSURE: 84 MMHG

## 2021-04-14 DIAGNOSIS — S89.92XA INJURY OF LEFT KNEE, INITIAL ENCOUNTER: Primary | ICD-10-CM

## 2021-04-14 DIAGNOSIS — M19.90 ARTHRITIS: ICD-10-CM

## 2021-04-14 PROCEDURE — 99213 OFFICE O/P EST LOW 20 MIN: CPT | Performed by: NURSE PRACTITIONER

## 2021-04-14 PROCEDURE — 73560 X-RAY EXAM OF KNEE 1 OR 2: CPT | Performed by: NURSE PRACTITIONER

## 2021-04-14 NOTE — ED PROVIDER NOTES
Patient Seen in: [facility]      History   Patient presents with:  Fall    Stated Complaint: fell/inj lt knee    HPI/Subjective:   HPI    59-year-old female presents to the immediate care ambulatory with complaints of continued pain to the ri Cervical back: Normal range of motion and neck supple. Comments: Patient has a full range of motion of the knee without difficulty normal strength no significant reproducible pain on exam no ligament laxity there is no calf pain. No redness.   No hip

## 2021-04-14 NOTE — ED INITIAL ASSESSMENT (HPI)
Pt presents to the IC with c/o a fall after losing her balance while bending to get somehting off the floor 2 weeks ago. Pt thinks she \"wrenched\" her left knee. Pain is worse at the end of the day. No swelling/bruising/redness or deformity.  Pt has full R

## 2021-04-28 ENCOUNTER — OFFICE VISIT (OUTPATIENT)
Dept: AUDIOLOGY | Facility: CLINIC | Age: 86
End: 2021-04-28

## 2021-04-28 DIAGNOSIS — H90.3 SENSORINEURAL HEARING LOSS, BILATERAL: Primary | ICD-10-CM

## 2021-04-28 PROCEDURE — V5261 HEARING AID, DIGIT, BIN, BTE: HCPCS | Performed by: AUDIOLOGIST

## 2021-04-28 NOTE — PROGRESS NOTES
Hearing Aid R Dottie Rowley 1 purchased two hearing aids. The hearing aid fitting was completed by Sanju De Los Santos       Hearing Aid Information  Hayden Casanova P50-R  Right #[id number]  Left #[id number]  Coupled to custom c-shells  Date

## 2021-05-19 ENCOUNTER — OFFICE VISIT (OUTPATIENT)
Dept: AUDIOLOGY | Facility: CLINIC | Age: 86
End: 2021-05-19
Payer: MEDICARE

## 2021-05-19 DIAGNOSIS — H90.6 MIXED HEARING LOSS, BILATERAL: Primary | ICD-10-CM

## 2021-05-19 PROCEDURE — 92593 HEARING AID CHECK, BOTH EARS: CPT | Performed by: AUDIOLOGIST

## 2021-05-19 NOTE — PROGRESS NOTES
HEARING AID FOLLOW-UP    Lisa Tavears  3/2/1925  [id number]    Patient is here for first follow up visit after new hearing aid fitting.    Hearing Aid Information  Alicja Vargas P50-R  Right #[id number]  Left #[id number]  Coupled to custom c-shells  Ben

## 2021-06-16 ENCOUNTER — LAB ENCOUNTER (OUTPATIENT)
Dept: LAB | Age: 86
End: 2021-06-16
Attending: UROLOGY
Payer: MEDICARE

## 2021-06-16 DIAGNOSIS — N39.0 RECURRENT UTI: ICD-10-CM

## 2021-06-16 PROCEDURE — 81001 URINALYSIS AUTO W/SCOPE: CPT

## 2021-06-16 PROCEDURE — 87186 SC STD MICRODIL/AGAR DIL: CPT

## 2021-06-16 PROCEDURE — 87086 URINE CULTURE/COLONY COUNT: CPT

## 2021-06-16 PROCEDURE — 87077 CULTURE AEROBIC IDENTIFY: CPT

## 2021-06-18 ENCOUNTER — TELEPHONE (OUTPATIENT)
Dept: SURGERY | Facility: CLINIC | Age: 86
End: 2021-06-18

## 2021-06-18 DIAGNOSIS — N39.0 RECURRENT UTI: ICD-10-CM

## 2021-06-18 NOTE — TELEPHONE ENCOUNTER
Called patient, identified by name and     Discussed results as outlined by RA below    CHITRA [facility] Clinical Staff  Please let patient know her urine culture is positive for bacteria.  I am going to send amoxicillin 500 mg

## 2021-06-28 ENCOUNTER — LAB ENCOUNTER (OUTPATIENT)
Dept: LAB | Age: 86
End: 2021-06-28
Attending: NURSE PRACTITIONER
Payer: MEDICARE

## 2021-06-28 PROCEDURE — 81003 URINALYSIS AUTO W/O SCOPE: CPT | Performed by: NURSE PRACTITIONER

## 2021-06-28 PROCEDURE — 87086 URINE CULTURE/COLONY COUNT: CPT | Performed by: NURSE PRACTITIONER

## 2021-06-30 ENCOUNTER — TELEPHONE (OUTPATIENT)
Dept: SURGERY | Facility: CLINIC | Age: 86
End: 2021-06-30

## 2021-06-30 NOTE — TELEPHONE ENCOUNTER
-S/w pt; identity verified with name & .  -I read message from [facility] as stated below.  -Pt expressed \"relief that I don't have an infection. \"  -Encounter complete

## 2021-06-30 NOTE — TELEPHONE ENCOUNTER
Fwd to [facility] to review results from 6/28  UA finalized, Urine culture in preliminary stages

## 2021-06-30 NOTE — TELEPHONE ENCOUNTER
Her UA is normal.  Urine culture is negative for any significant bacteria. Consistent with contamination. No evidence of infection.

## 2021-07-07 ENCOUNTER — LAB ENCOUNTER (OUTPATIENT)
Dept: LAB | Age: 86
End: 2021-07-07
Attending: UROLOGY
Payer: MEDICARE

## 2021-07-07 ENCOUNTER — TELEPHONE (OUTPATIENT)
Dept: SURGERY | Facility: CLINIC | Age: 86
End: 2021-07-07

## 2021-07-07 DIAGNOSIS — N39.0 RECURRENT UTI: ICD-10-CM

## 2021-07-07 LAB
BILIRUB UR QL: NEGATIVE
COLOR UR: YELLOW
GLUCOSE UR-MCNC: NEGATIVE MG/DL
KETONES UR-MCNC: NEGATIVE MG/DL
NITRITE UR QL STRIP.AUTO: POSITIVE
PH UR: 6 [PH] (ref 5–8)
PROT UR-MCNC: 100 MG/DL
RBC #/AREA URNS AUTO: >10 /HPF
SP GR UR STRIP: 1.01 (ref 1–1.03)
UROBILINOGEN UR STRIP-ACNC: <2
WBC #/AREA URNS AUTO: >50 /HPF
WBC CLUMPS UR QL AUTO: PRESENT /HPF

## 2021-07-07 PROCEDURE — 87186 SC STD MICRODIL/AGAR DIL: CPT

## 2021-07-07 PROCEDURE — 81001 URINALYSIS AUTO W/SCOPE: CPT

## 2021-07-07 PROCEDURE — 87086 URINE CULTURE/COLONY COUNT: CPT

## 2021-07-07 PROCEDURE — 87077 CULTURE AEROBIC IDENTIFY: CPT

## 2021-07-07 RX ORDER — NITROFURANTOIN 25; 75 MG/1; MG/1
100 CAPSULE ORAL 2 TIMES DAILY
Qty: 14 CAPSULE | Refills: 0 | Status: SHIPPED | OUTPATIENT
Start: 2021-07-07 | End: 2021-07-14

## 2021-07-07 NOTE — TELEPHONE ENCOUNTER
Pt called stating pt has a standing order for a urine test.  Pt does not have enough urine to bring in. Can pt keep adding to the container.  Please call pt

## 2021-07-07 NOTE — TELEPHONE ENCOUNTER
Will send nitrofurantoin 100 mg PO BID x 7 days. I will notify her once culture results are received.

## 2021-07-07 NOTE — TELEPHONE ENCOUNTER
Called patient, left message with RA's message below  Left message for her to  abx and call us if she has any questions otherwise we will call her with finalized urinalysis and urine culture results

## 2021-07-07 NOTE — TELEPHONE ENCOUNTER
-Routed to [employer] APN: Please review & advise. Do you want pt to start ABX today, or wait for 48 hr. Sensitivity? Thank Cece Cruz    -S/w pt; identity verified with name & .    - Pt completed 7-day Amoxicillin course prescribed on 21.

## 2021-07-07 NOTE — TELEPHONE ENCOUNTER
Patient states she has been having pain and frequency and urinating only drops at a time please advise

## 2021-07-09 NOTE — TELEPHONE ENCOUNTER
Patient contacted. Patient is aware of her test result, Megha's recommendation/message and verbalized understanding. Patient states she is feeling better.

## 2021-07-09 NOTE — TELEPHONE ENCOUNTER
----- Message from CHITRA Jose sent at 7/9/2021  2:39 PM CDT -----  Please let patient know her urine culture is growing E. Coli bacteria. The nitrofurantoin that I prescribed her should be effective in treating this.   She should complete th

## 2021-07-12 NOTE — TELEPHONE ENCOUNTER
Spoke to patient. Patient states she started nitrofurantoin 3 days ago as prescribed. Patient states she experienced abdominal pain so she skipped 2 doses.       Patient states she contacted her pharmacist who told her to continue the antibiotic, it will

## 2021-07-12 NOTE — TELEPHONE ENCOUNTER
Per pt wanting to give update on antibiotic, states missed 2 doses. Pt starting taking medication yesterday.  Please advise

## 2021-07-28 ENCOUNTER — OFFICE VISIT (OUTPATIENT)
Dept: AUDIOLOGY | Facility: CLINIC | Age: 86
End: 2021-07-28
Payer: MEDICARE

## 2021-07-28 DIAGNOSIS — H90.3 SENSORINEURAL HEARING LOSS, BILATERAL: Primary | ICD-10-CM

## 2021-07-28 PROCEDURE — 92593 HEARING AID CHECK, BOTH EARS: CPT | Performed by: AUDIOLOGIST

## 2021-07-28 NOTE — PROGRESS NOTES
HEARING AID FOLLOW-UP    Santana Mackay  3/2/1925  [id number]    Patient is here for hearing aid problem.     Hearing Aid Information  Ana Maria Days P50-R  Right #[id number]  Left #[id number]  Coupled to custom c-shells  Date Dispensed:  4-28-21  Battery:

## 2021-08-03 ENCOUNTER — AUDIOLOGY DOCUMENTATION (OUTPATIENT)
Dept: AUDIOLOGY | Facility: CLINIC | Age: 86
End: 2021-08-03

## 2021-08-04 NOTE — PROGRESS NOTES
Patient called and stated that hearing aid/c-shell are still working out of ear. Decided to go ahead and order a remake of c-shell with addition of a canal lock.    Patient has remake warranty until 8-14 per customer service and we do not HAVE to send in

## 2021-08-19 ENCOUNTER — OFFICE VISIT (OUTPATIENT)
Dept: AUDIOLOGY | Facility: CLINIC | Age: 86
End: 2021-08-19
Payer: MEDICARE

## 2021-08-19 DIAGNOSIS — H90.3 SENSORINEURAL HEARING LOSS, BILATERAL: Primary | ICD-10-CM

## 2021-08-19 PROCEDURE — 92593 HEARING AID CHECK, BOTH EARS: CPT | Performed by: AUDIOLOGIST

## 2021-08-19 NOTE — PROGRESS NOTES
HEARING AID FOLLOW-UP    Julianna Pass  3/2/1925  [id number]    Patient is here for  of new c-shell with canal lock for right ear.      Hearing Aid Information  Piedad Flores P50-R  Right #[id number]  Left #[id number]  Coupled to custom c-shells

## 2021-09-23 ENCOUNTER — HOSPITAL ENCOUNTER (OUTPATIENT)
Age: 86
Discharge: HOME | End: 2021-09-23
Payer: MEDICARE

## 2021-09-23 ENCOUNTER — APPOINTMENT (OUTPATIENT)
Dept: GENERAL RADIOLOGY | Age: 86
End: 2021-09-23
Attending: NURSE PRACTITIONER
Payer: MEDICARE

## 2021-09-23 VITALS
OXYGEN SATURATION: 100 % | TEMPERATURE: 99 F | HEIGHT: 64 IN | HEART RATE: 93 BPM | RESPIRATION RATE: 18 BRPM | SYSTOLIC BLOOD PRESSURE: 143 MMHG | WEIGHT: 104 LBS | BODY MASS INDEX: 17.75 KG/M2 | DIASTOLIC BLOOD PRESSURE: 83 MMHG

## 2021-09-23 DIAGNOSIS — N39.0 RECURRENT UTI: ICD-10-CM

## 2021-09-23 DIAGNOSIS — M25.532 LEFT WRIST PAIN: ICD-10-CM

## 2021-09-23 DIAGNOSIS — S93.401A MILD SPRAIN OF RIGHT ANKLE, INITIAL ENCOUNTER: ICD-10-CM
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
## 2021-09-23 DIAGNOSIS — S80.01XA CONTUSION OF RIGHT KNEE, INITIAL ENCOUNTER: ICD-10-CM

## 2021-09-23 PROCEDURE — 73110 X-RAY EXAM OF WRIST: CPT | Performed by: NURSE PRACTITIONER

## 2021-09-23 PROCEDURE — 99213 OFFICE O/P EST LOW 20 MIN: CPT | Performed by: NURSE PRACTITIONER

## 2021-09-23 PROCEDURE — 73560 X-RAY EXAM OF KNEE 1 OR 2: CPT | Performed by: NURSE PRACTITIONER

## 2021-09-23 PROCEDURE — 73630 X-RAY EXAM OF FOOT: CPT | Performed by: NURSE PRACTITIONER

## 2021-09-23 PROCEDURE — 73610 X-RAY EXAM OF ANKLE: CPT | Performed by: NURSE PRACTITIONER

## 2021-09-23 NOTE — ED INITIAL ASSESSMENT (HPI)
Tripped on rug Saturday and injured rt knee and foot pain no swelling no bruises just wants to make sure she isok

## 2021-09-23 NOTE — ED PROVIDER NOTES
Patient Seen in: [facility]      History   Patient presents with:  Fall    Stated Complaint: FALL INJURED KNEE,FOOT    Subjective:   HPI    This is a 55-year-old female presenting for a fall.   Patient states, on Saturday she tripped over a ru Systems    Positive for stated complaint: FALL INJURED KNEE,FOOT  Other systems are as noted in HPI. Constitutional and vital signs reviewed. All other systems reviewed and negative except as noted above.     Physical Exam     ED Triage Vitals [09/23/ Skin:     General: Skin is warm and dry. Capillary Refill: Capillary refill takes less than 2 seconds. Neurological:      General: No focal deficit present. Mental Status: She is alert and oriented to person, place, and time.    Psychiatric: [facility] in the [facility]. HPI and PE as documented above. Concern for contusions versus fracture x-rays will be ordered. Discussed this plan of care with the patient and she agrees.   Anticipate discharge home with supportive therapy instructions if imaging is ne

## 2021-10-17 ENCOUNTER — APPOINTMENT (OUTPATIENT)
Dept: LAB | Age: 86
End: 2021-10-17
Attending: NURSE PRACTITIONER
Payer: MEDICARE

## 2021-10-20 ENCOUNTER — TELEPHONE (OUTPATIENT)
Dept: SURGERY | Facility: CLINIC | Age: 86
End: 2021-10-20

## 2021-10-20 NOTE — TELEPHONE ENCOUNTER
S/W pt and informed her of [facility]'s results msg a stated below and she verbalized understanding and compliance.

## 2021-10-20 NOTE — TELEPHONE ENCOUNTER
----- Message from CHITRA Johnson sent at 10/20/2021  8:59 AM CDT -----  Please let patient know her urine culture is positive for bacteria. I am going to send keflex 500 mg PO BID x 7 days to her pharmacy.

## 2021-10-22 NOTE — TELEPHONE ENCOUNTER
Pt called stating third day taking the antibiotic. pt is having diarrhea. On and off pain in the right side. Pt is real tired. still having burning after urinating. Pt also taking florastor.    Please call pt

## 2021-10-22 NOTE — TELEPHONE ENCOUNTER
-S/w pt; accurately verifies identity with name & .  -Pt reports she has been on Keflex since 10/20/21. She no longer has burning with urination. However, her stool is no longer \"formed\" but \"loose & not watery. \"  -In addition, she has noticed a \"

## 2021-10-23 ENCOUNTER — HOSPITAL ENCOUNTER (EMERGENCY)
Facility: HOSPITAL | Age: 86
Discharge: HOME | End: 2021-10-23
Attending: EMERGENCY MEDICINE
Payer: MEDICARE

## 2021-10-23 VITALS
SYSTOLIC BLOOD PRESSURE: 135 MMHG | DIASTOLIC BLOOD PRESSURE: 86 MMHG | HEIGHT: 61 IN | RESPIRATION RATE: 17 BRPM | OXYGEN SATURATION: 96 % | WEIGHT: 105 LBS | TEMPERATURE: 98 F | HEART RATE: 97 BPM | BODY MASS INDEX: 19.83 KG/M2

## 2021-10-23 DIAGNOSIS — K52.1 ANTIBIOTIC-ASSOCIATED DIARRHEA: Primary | ICD-10-CM

## 2021-10-23 DIAGNOSIS — T36.95XA ANTIBIOTIC-ASSOCIATED DIARRHEA: Primary | ICD-10-CM

## 2021-10-23 PROCEDURE — 99282 EMERGENCY DEPT VISIT SF MDM: CPT

## 2021-10-24 NOTE — ED PROVIDER NOTES
Patient Seen in: [facility] Emergency Department    History   Patient presents with:  Diarrhea      HPI    The patient presents to the ED complaining of diarrhea today, 3 episodes in total.  She is currently on Keflex for UTI.   Denies abdominal pa No        Alcohol/week: 0.0 standard drinks      Drug use: No    Other Topics      Concerns:        Caffeine Concern: Yes          Coffee, 1 cup daily        Exercise: No      ROS  Pertinent positives: Diarrhea, abdominal cramps  All other organ systems ar Reviewed while in ED: No results found.   ED Medications Administered: Medications - No data to display      MDM      10/23/21  1631 10/23/21  1633 10/23/21  1808   BP: (!) 159/91  135/86   Pulse: 99  97   Resp: 16  17   Temp: 97.5 °F (36.4 °C)     TempSrc:

## 2021-10-28 ENCOUNTER — LAB ENCOUNTER (OUTPATIENT)
Dept: LAB | Age: 86
End: 2021-10-28
Attending: NURSE PRACTITIONER
Payer: MEDICARE

## 2021-10-28 ENCOUNTER — TELEPHONE (OUTPATIENT)
Dept: SURGERY | Facility: CLINIC | Age: 86
End: 2021-10-28

## 2021-10-28 DIAGNOSIS — N39.0 RECURRENT UTI: ICD-10-CM

## 2021-10-28 PROCEDURE — 81001 URINALYSIS AUTO W/SCOPE: CPT

## 2021-10-28 PROCEDURE — 87086 URINE CULTURE/COLONY COUNT: CPT

## 2021-10-28 NOTE — TELEPHONE ENCOUNTER
Per pt finished cephalexin for a bladder infection and states she has frequent urination with burning. Per pt asking if she can do another urine test to see if the infection is still there.  Please advise

## 2021-10-28 NOTE — TELEPHONE ENCOUNTER
-Routed to SCOTT NEGRETE:  Please review & advise. Thank Carl Willett    -S/w pt; identity verified with name & .  -She reports completing Keflex 7-day course on 10/26/21; \"burning & frequency went away. \"  -On 10/27/21 \"around 4pm, the burning wi

## 2021-10-29 NOTE — TELEPHONE ENCOUNTER
Her urinalysis so far is normal.  Culture is pending. Please have her push fluids for now. Would not recommend another antibiotic until we confirm bacteria on culture.

## 2021-11-01 ENCOUNTER — TELEPHONE (OUTPATIENT)
Dept: SURGERY | Facility: CLINIC | Age: 86
End: 2021-11-01

## 2021-11-01 NOTE — TELEPHONE ENCOUNTER
-Routed to SCOTT CONKLINN:  Please review & advise.   Thank You, Maximo France    -  URINE CULTURE <10,000 CFU/ML Gram negative dany    Three types   No further workup, Susceptibility not clinically indicated        Resulting Agency: [facility])

## 2021-11-01 NOTE — TELEPHONE ENCOUNTER
-S/w pt; identity verified with name & .  -I read message from [facility] as stated below.  -Pt reports she is asymptomatic for UTI symptoms.  -Annual appt secured for 21 @ 11:30am.  -Encounter complete.

## 2021-11-01 NOTE — TELEPHONE ENCOUNTER
Her culture is negative for any dominant bacteria and does not suggest infection. If she has persistent symptoms or symptoms worsen I would recommend repeat culture.

## 2021-12-02 ENCOUNTER — OFFICE VISIT (OUTPATIENT)
Dept: SURGERY | Facility: CLINIC | Age: 86
End: 2021-12-02
Payer: MEDICARE

## 2021-12-02 DIAGNOSIS — N39.0 RECURRENT UTI: Primary | ICD-10-CM

## 2021-12-02 DIAGNOSIS — D17.71 RENAL ANGIOMYOLIPOMA: ICD-10-CM

## 2021-12-02 PROCEDURE — 99213 OFFICE O/P EST LOW 20 MIN: CPT | Performed by: NURSE PRACTITIONER

## 2021-12-02 RX ORDER — NITROFURANTOIN 25; 75 MG/1; MG/1
100 CAPSULE ORAL 2 TIMES DAILY
Qty: 10 CAPSULE | Refills: 5 | Status: SHIPPED | OUTPATIENT
Start: 2021-12-02

## 2021-12-02 NOTE — PROGRESS NOTES
HPI:    Patient ID: Bee Orozco is a 80year old female. HPI     Patient is a 80year old female who presents to the clinic for a follow up. Previously seen in the office 12/29/20.   History of recurrent UTI and large left sided angiomyolipoma Take 1 capsule by mouth daily. • PARoxetine 10 MG Oral Tab Take  by mouth. take 1 tablet (10MG)  by ORAL route  every day     • Montelukast Sodium 10 MG Oral Tab Take 1 tablet (10 mg total) by mouth nightly.  (Patient not taking: Reported on 12/2/2021) Normocephalic. Eyes:      Conjunctiva/sclera: Conjunctivae normal.   Cardiovascular:      Rate and Rhythm: Normal rate. Pulmonary:      Effort: Pulmonary effort is normal. No respiratory distress. Abdominal:      General: There is no distension. capsule 5     Sig: Take 1 capsule (100 mg total) by mouth 2 (two) times daily.        Imaging & Referrals:  None        #[id number]

## 2021-12-07 ENCOUNTER — HOSPITAL ENCOUNTER (OUTPATIENT)
Dept: MAMMOGRAPHY | Facility: HOSPITAL | Age: 86
Discharge: HOME | End: 2021-12-07
Attending: INTERNAL MEDICINE
Payer: MEDICARE

## 2021-12-07 DIAGNOSIS — C50.411 MALIGNANT NEOPLASM OF UPPER-OUTER QUADRANT OF RIGHT FEMALE BREAST, UNSPECIFIED ESTROGEN RECEPTOR STATUS (HCC): ICD-10-CM

## 2021-12-07 DIAGNOSIS — C50.112 MALIGNANT NEOPLASM OF CENTRAL PORTION OF LEFT BREAST IN FEMALE, ESTROGEN RECEPTOR POSITIVE (HCC): ICD-10-CM

## 2021-12-07 DIAGNOSIS — Z12.31 ENCOUNTER FOR SCREENING MAMMOGRAM FOR MALIGNANT NEOPLASM OF BREAST: ICD-10-CM

## 2021-12-07 DIAGNOSIS — Z17.0 MALIGNANT NEOPLASM OF CENTRAL PORTION OF LEFT BREAST IN FEMALE, ESTROGEN RECEPTOR POSITIVE (HCC): ICD-10-CM

## 2021-12-07 PROCEDURE — 77063 BREAST TOMOSYNTHESIS BI: CPT | Performed by: INTERNAL MEDICINE

## 2021-12-07 PROCEDURE — 77067 SCR MAMMO BI INCL CAD: CPT | Performed by: INTERNAL MEDICINE

## 2021-12-11 ENCOUNTER — TELEPHONE (OUTPATIENT)
Dept: HEMATOLOGY/ONCOLOGY | Facility: HOSPITAL | Age: 86
End: 2021-12-11

## 2022-01-10 ENCOUNTER — APPOINTMENT (OUTPATIENT)
Dept: HEMATOLOGY/ONCOLOGY | Facility: HOSPITAL | Age: 87
End: 2022-01-10
Attending: INTERNAL MEDICINE
Payer: MEDICARE

## 2022-02-01 ENCOUNTER — APPOINTMENT (OUTPATIENT)
Dept: HEMATOLOGY/ONCOLOGY | Facility: HOSPITAL | Age: 87
End: 2022-02-01
Attending: INTERNAL MEDICINE
Payer: MEDICARE

## 2022-02-12 ENCOUNTER — LAB ENCOUNTER (OUTPATIENT)
Dept: LAB | Age: 87
End: 2022-02-12
Attending: NURSE PRACTITIONER
Payer: MEDICARE

## 2022-02-12 DIAGNOSIS — N39.0 RECURRENT UTI: ICD-10-CM

## 2022-02-12 LAB
BILIRUB UR QL: NEGATIVE
COLOR UR: YELLOW
GLUCOSE UR-MCNC: NEGATIVE MG/DL
KETONES UR-MCNC: NEGATIVE MG/DL
NITRITE UR QL STRIP.AUTO: NEGATIVE
PH UR: 7 [PH] (ref 5–8)
PROT UR-MCNC: NEGATIVE MG/DL
SP GR UR STRIP: 1.01 (ref 1–1.03)
UROBILINOGEN UR STRIP-ACNC: <2
WBC #/AREA URNS AUTO: >50 /HPF
WBC CLUMPS UR QL AUTO: PRESENT /HPF

## 2022-02-12 PROCEDURE — 87077 CULTURE AEROBIC IDENTIFY: CPT

## 2022-02-12 PROCEDURE — 81001 URINALYSIS AUTO W/SCOPE: CPT

## 2022-02-12 PROCEDURE — 87186 SC STD MICRODIL/AGAR DIL: CPT

## 2022-02-12 PROCEDURE — 87086 URINE CULTURE/COLONY COUNT: CPT

## 2022-02-14 ENCOUNTER — TELEPHONE (OUTPATIENT)
Dept: SURGERY | Facility: CLINIC | Age: 87
End: 2022-02-14

## 2022-02-14 ENCOUNTER — MOBILE ENCOUNTER (OUTPATIENT)
Dept: SURGERY | Facility: CLINIC | Age: 87
End: 2022-02-14

## 2022-02-14 RX ORDER — AMOXICILLIN 875 MG/1
875 TABLET, COATED ORAL 2 TIMES DAILY
Qty: 10 TABLET | Refills: 0 | Status: SHIPPED | OUTPATIENT
Start: 2022-02-14 | End: 2022-02-19

## 2022-02-14 NOTE — TELEPHONE ENCOUNTER
----- Message from CHITRA Cross sent at 2/14/2022  1:11 PM CST -----  Please let patient know her urine culture is positive for bacteria. I am going to send Amoxicillin 875 mg PO BID x 5 days to her pharmacy.

## 2022-02-14 NOTE — TELEPHONE ENCOUNTER
Let pt know about results and [facility] sending amoxicillin to her pharmacy. Verbalized understanding. Verified pharmacy is [facility] in [place].

## 2022-03-12 ENCOUNTER — LAB ENCOUNTER (OUTPATIENT)
Dept: LAB | Age: 87
End: 2022-03-12
Attending: UROLOGY
Payer: MEDICARE

## 2022-03-12 DIAGNOSIS — N39.0 RECURRENT UTI: ICD-10-CM

## 2022-03-12 LAB
BILIRUB UR QL: NEGATIVE
COLOR UR: YELLOW
GLUCOSE UR-MCNC: NEGATIVE MG/DL
KETONES UR-MCNC: NEGATIVE MG/DL
NITRITE UR QL STRIP.AUTO: POSITIVE
PH UR: 6 [PH] (ref 5–8)
PROT UR-MCNC: NEGATIVE MG/DL
SP GR UR STRIP: 1.01 (ref 1–1.03)
UROBILINOGEN UR STRIP-ACNC: <2
VIT C UR-MCNC: NEGATIVE MG/DL

## 2022-03-12 PROCEDURE — 81001 URINALYSIS AUTO W/SCOPE: CPT

## 2022-03-12 PROCEDURE — 87086 URINE CULTURE/COLONY COUNT: CPT

## 2022-03-15 ENCOUNTER — TELEPHONE (OUTPATIENT)
Dept: SURGERY | Facility: CLINIC | Age: 87
End: 2022-03-15

## 2022-03-15 NOTE — TELEPHONE ENCOUNTER
Pt called to check if the office received the results of the urine culture done on 3-12-22.   Please call pt

## 2022-03-15 NOTE — TELEPHONE ENCOUNTER
Pt states she felt some pressure and burning a few days ago and that is why she decided to take a UA/UC test from standing order. Pt states she took AZO afterwards for a couple of days. Symptoms are now absent. UA abnormal but culture showed no growth for bacteria. Routed to [facility];  Please advise on any further recommendations, thank you.

## 2022-03-16 NOTE — TELEPHONE ENCOUNTER
Noted. Let pt know if symptoms return that she can submit another specimen in the next few days. Otherwise no treatment indicated at this time.

## 2022-03-16 NOTE — TELEPHONE ENCOUNTER
The Azo is what likely caused the positive nitrites in the urine. Given her symptoms have resolved no new recommendations at this time.   If symptoms return she should submit a repeat urine test.

## 2022-04-01 ENCOUNTER — LAB ENCOUNTER (OUTPATIENT)
Dept: LAB | Age: 87
End: 2022-04-01
Attending: NURSE PRACTITIONER
Payer: MEDICARE

## 2022-04-01 DIAGNOSIS — N39.0 RECURRENT UTI: ICD-10-CM

## 2022-04-01 LAB
BILIRUB UR QL: NEGATIVE
COLOR UR: YELLOW
GLUCOSE UR-MCNC: NEGATIVE MG/DL
HYALINE CASTS #/AREA URNS AUTO: PRESENT /LPF
KETONES UR-MCNC: NEGATIVE MG/DL
NITRITE UR QL STRIP.AUTO: NEGATIVE
PH UR: 6 [PH] (ref 5–8)
PROT UR-MCNC: 100 MG/DL
RBC #/AREA URNS AUTO: >10 /HPF
SP GR UR STRIP: 1.01 (ref 1–1.03)
UROBILINOGEN UR STRIP-ACNC: <2
VIT C UR-MCNC: NEGATIVE MG/DL
WBC #/AREA URNS AUTO: >50 /HPF
WBC CLUMPS UR QL AUTO: PRESENT /HPF

## 2022-04-01 PROCEDURE — 87086 URINE CULTURE/COLONY COUNT: CPT

## 2022-04-01 PROCEDURE — 81001 URINALYSIS AUTO W/SCOPE: CPT

## 2022-04-01 PROCEDURE — 87186 SC STD MICRODIL/AGAR DIL: CPT

## 2022-04-04 ENCOUNTER — TELEPHONE (OUTPATIENT)
Dept: SURGERY | Facility: CLINIC | Age: 87
End: 2022-04-04

## 2022-04-04 RX ORDER — AMOXICILLIN 875 MG/1
875 TABLET, COATED ORAL 2 TIMES DAILY
Qty: 14 TABLET | Refills: 0 | Status: SHIPPED | OUTPATIENT
Start: 2022-04-04 | End: 2022-04-11

## 2022-04-04 NOTE — TELEPHONE ENCOUNTER
Called pt and notified her to start taking amoxicillin 875 mg BID x 7 days. Pt is worried about taking a large mg amount. Let pt know to take antibiotic with food, increase yogurt intake, and if side effects are bothersome to call office back. Verbalized understanding.

## 2022-04-04 NOTE — TELEPHONE ENCOUNTER
----- Message from CHITRA Hall sent at 4/4/2022 10:28 AM CDT -----  Please let patient know her urine culture is positive for bacteria. I am going to send amoxicillin 875 mg PO BID x 7 days to her pharmacy.

## 2022-04-28 ENCOUNTER — OFFICE VISIT (OUTPATIENT)
Dept: AUDIOLOGY | Facility: CLINIC | Age: 87
End: 2022-04-28
Payer: MEDICARE

## 2022-04-28 ENCOUNTER — OFFICE VISIT (OUTPATIENT)
Dept: OTOLARYNGOLOGY | Facility: CLINIC | Age: 87
End: 2022-04-28
Payer: MEDICARE

## 2022-04-28 DIAGNOSIS — H61.23 BILATERAL IMPACTED CERUMEN: Primary | ICD-10-CM

## 2022-04-28 DIAGNOSIS — H90.6 MIXED HEARING LOSS, BILATERAL: Primary | ICD-10-CM

## 2022-04-28 PROCEDURE — 92593 HEARING AID CHECK, BOTH EARS: CPT | Performed by: AUDIOLOGIST

## 2022-05-03 ENCOUNTER — LAB ENCOUNTER (OUTPATIENT)
Dept: LAB | Age: 87
End: 2022-05-03
Attending: NURSE PRACTITIONER
Payer: MEDICARE

## 2022-05-03 DIAGNOSIS — N39.0 RECURRENT UTI: ICD-10-CM

## 2022-05-03 LAB
BILIRUB UR QL: NEGATIVE
COLOR UR: YELLOW
GLUCOSE UR-MCNC: NEGATIVE MG/DL
KETONES UR-MCNC: NEGATIVE MG/DL
NITRITE UR QL STRIP.AUTO: NEGATIVE
PH UR: 7 [PH] (ref 5–8)
PROT UR-MCNC: 100 MG/DL
RBC #/AREA URNS AUTO: >10 /HPF
SP GR UR STRIP: 1.01 (ref 1–1.03)
UROBILINOGEN UR STRIP-ACNC: <2
VIT C UR-MCNC: NEGATIVE MG/DL
WBC #/AREA URNS AUTO: >50 /HPF
WBC CLUMPS UR QL AUTO: PRESENT /HPF

## 2022-05-03 PROCEDURE — 81001 URINALYSIS AUTO W/SCOPE: CPT

## 2022-05-03 PROCEDURE — 87186 SC STD MICRODIL/AGAR DIL: CPT

## 2022-05-03 PROCEDURE — 87086 URINE CULTURE/COLONY COUNT: CPT

## 2022-05-03 PROCEDURE — 87088 URINE BACTERIA CULTURE: CPT

## 2022-05-05 ENCOUNTER — TELEPHONE (OUTPATIENT)
Dept: SURGERY | Facility: CLINIC | Age: 87
End: 2022-05-05

## 2022-05-05 RX ORDER — CEFADROXIL 500 MG/1
500 CAPSULE ORAL 2 TIMES DAILY
Qty: 14 CAPSULE | Refills: 0 | Status: SHIPPED | OUTPATIENT
Start: 2022-05-05 | End: 2022-05-12

## 2022-05-05 NOTE — TELEPHONE ENCOUNTER
- S/w pt. Read message from [facility] as stated below. Pt verbalized understanding.       ----- Message from CHITRA Marsh sent at 5/5/2022 10:43 AM CDT -----  Please let patient know her urine culture is positive for bacteria. I am going to send Duricef 500 mg PO BID x 7 days to her pharmacy. I hope she is feeling better.

## 2022-05-12 ENCOUNTER — TELEPHONE (OUTPATIENT)
Dept: SURGERY | Facility: CLINIC | Age: 87
End: 2022-05-12

## 2022-05-12 NOTE — TELEPHONE ENCOUNTER
-Routed to SCOTT NEGRETE:   Please review & advise. Do you want pt to receive add'l ABX? Thank Ijeoma Mari RN    -S/w pt; identity verified with name & .  -Pt completed 7-day course for Cefadroxil 22 for UTI; \"and it did get better. \"  -Still having burning & pressure. Nocturia 4-5Xs has decreased to 1X (normal). No hematuria. Voids every 2-3hrs during waking hours.    -She has UTI dipsticks from [employer]; reports leukocytes & negative for nitrates. -Outcome pending.

## 2022-05-13 ENCOUNTER — LAB ENCOUNTER (OUTPATIENT)
Dept: LAB | Age: 87
End: 2022-05-13
Attending: NURSE PRACTITIONER
Payer: MEDICARE

## 2022-05-13 DIAGNOSIS — N39.0 RECURRENT UTI: ICD-10-CM

## 2022-05-13 LAB
BILIRUB UR QL: NEGATIVE
CLARITY UR: CLEAR
COLOR UR: YELLOW
GLUCOSE UR-MCNC: NEGATIVE MG/DL
HGB UR QL STRIP.AUTO: NEGATIVE
KETONES UR-MCNC: NEGATIVE MG/DL
NITRITE UR QL STRIP.AUTO: NEGATIVE
PH UR: 7 [PH] (ref 5–8)
PROT UR-MCNC: NEGATIVE MG/DL
SP GR UR STRIP: 1.01 (ref 1–1.03)
UROBILINOGEN UR STRIP-ACNC: <2
VIT C UR-MCNC: NEGATIVE MG/DL

## 2022-05-13 PROCEDURE — 87086 URINE CULTURE/COLONY COUNT: CPT

## 2022-05-13 PROCEDURE — 81001 URINALYSIS AUTO W/SCOPE: CPT

## 2022-05-13 RX ORDER — CEPHALEXIN 500 MG/1
500 CAPSULE ORAL 2 TIMES DAILY
Qty: 10 CAPSULE | Refills: 0 | Status: SHIPPED | OUTPATIENT
Start: 2022-05-13 | End: 2022-05-18

## 2022-05-13 NOTE — TELEPHONE ENCOUNTER
Agree with urine culture. After submitting culture she can start Keflex 500 mg PO BID x 5 days. We will let her know Monday regarding her results. She should also push fluids and make sure she is not constipated.

## 2022-05-13 NOTE — TELEPHONE ENCOUNTER
S/W pt and informed her of [facility]'s msg response a stated below and pt will submit the urine C&S today and call the office on Monday for her results and also will start the ABX after she submits the urine specimen.

## 2022-05-13 NOTE — TELEPHONE ENCOUNTER
S/W pt and told her that nurse Heike Pyle sent [facility] this msg on 5/12 and I sent it to her today. I told pt that the only way to know for sure if she has a UTI is to repeat a urine culture. I explained that the urine dip that she performed is not positive for nitrites and she just finished her ABX on 5/11 and I suggested she just repeat the urine test on Monday. I told her that if [facility] has any other orders or instructions we will call her back.

## 2022-05-16 ENCOUNTER — TELEPHONE (OUTPATIENT)
Dept: SURGERY | Facility: CLINIC | Age: 87
End: 2022-05-16

## 2022-05-16 NOTE — TELEPHONE ENCOUNTER
- Read message from [facility] as stated below. Pt verbalized understanding.  - States that she no longer has urgency or frequency. She urinates every 2 hours. The burning during urination has stopped. Reports burning after urination and slight pressure in her urethra and bladder at times. - urine is clear/yellow. States she has regular BMs.     - Routed to [facility]. Please advise, thank you.

## 2022-05-16 NOTE — TELEPHONE ENCOUNTER
----- Message from CHITRA Lincoln sent at 5/16/2022  8:58 AM CDT -----  Please let patient know her urine culture shows no bacterial growth. There is no evidence of infection. She can stop antibiotic. I recommend she push fluids, avoid bladder irritants. If symptoms persist please let us know.

## 2022-05-16 NOTE — TELEPHONE ENCOUNTER
Have her try an over the counter vaginal moisturizer. Sometimes these symptoms can be related to vaginal dryness. I will also place an order for a US bladder to make sure she is emptying appropriately. Please instruct her on how to schedule this.

## 2022-05-16 NOTE — TELEPHONE ENCOUNTER
- Read message from [facility] as stated below. - Pt verbalized understanding. Provided the number for central scheduling and instructed the pt to call that number and state she needs to schedule a bladder ultrasound at the [address] [place] [address] location.

## 2022-06-09 ENCOUNTER — HOSPITAL ENCOUNTER (OUTPATIENT)
Dept: ULTRASOUND IMAGING | Age: 87
Discharge: HOME | End: 2022-06-09
Attending: NURSE PRACTITIONER
Payer: MEDICARE

## 2022-06-09 DIAGNOSIS — R10.2 SUPRAPUBIC PRESSURE: ICD-10-CM

## 2022-06-09 PROCEDURE — 76857 US EXAM PELVIC LIMITED: CPT | Performed by: NURSE PRACTITIONER

## 2022-06-14 ENCOUNTER — TELEPHONE (OUTPATIENT)
Dept: SURGERY | Facility: CLINIC | Age: 87
End: 2022-06-14

## 2022-08-17 ENCOUNTER — LAB ENCOUNTER (OUTPATIENT)
Dept: LAB | Age: 87
End: 2022-08-17
Attending: NURSE PRACTITIONER
Payer: MEDICARE

## 2022-08-17 LAB
BILIRUB UR QL: NEGATIVE
COLOR UR: YELLOW
GLUCOSE UR-MCNC: NEGATIVE MG/DL
KETONES UR-MCNC: NEGATIVE MG/DL
NITRITE UR QL STRIP.AUTO: POSITIVE
PH UR: 7 [PH] (ref 5–8)
RBC #/AREA URNS AUTO: >10 /HPF
RBC #/AREA URNS AUTO: >10 /HPF
SP GR UR STRIP: 1.01 (ref 1–1.03)
UROBILINOGEN UR STRIP-ACNC: 0.2
WBC #/AREA URNS AUTO: >50 /HPF
WBC #/AREA URNS AUTO: >50 /HPF
WBC CLUMPS UR QL AUTO: PRESENT /HPF
WBC CLUMPS UR QL AUTO: PRESENT /HPF

## 2022-08-19 ENCOUNTER — TELEPHONE (OUTPATIENT)
Dept: SURGERY | Facility: CLINIC | Age: 87
End: 2022-08-19

## 2022-08-19 RX ORDER — CIPROFLOXACIN 250 MG/1
250 TABLET, FILM COATED ORAL 2 TIMES DAILY
Qty: 14 TABLET | Refills: 0 | Status: SHIPPED | OUTPATIENT
Start: 2022-08-19 | End: 2022-08-26

## 2022-08-19 NOTE — TELEPHONE ENCOUNTER
I called pt verified name/ informed pt that her urine culture is positive she already picked up her antibiotics from pharmacy (received call from pharmacy this morning) pt is aware to the call the office if no improvement and if any issues.

## 2022-08-31 ENCOUNTER — LAB ENCOUNTER (OUTPATIENT)
Dept: LAB | Age: 87
End: 2022-08-31
Attending: NURSE PRACTITIONER
Payer: MEDICARE

## 2022-08-31 LAB
BILIRUB UR QL: NEGATIVE
CLARITY UR: CLEAR
COLOR UR: YELLOW
GLUCOSE UR-MCNC: NEGATIVE MG/DL
HGB UR QL STRIP.AUTO: NEGATIVE
KETONES UR-MCNC: NEGATIVE MG/DL
LEUKOCYTE ESTERASE UR QL STRIP.AUTO: NEGATIVE
NITRITE UR QL STRIP.AUTO: NEGATIVE
PH UR: 7 [PH] (ref 5–8)
PROT UR-MCNC: NEGATIVE MG/DL
SP GR UR STRIP: 1.02 (ref 1–1.03)
UROBILINOGEN UR STRIP-ACNC: 0.2

## 2022-09-02 ENCOUNTER — TELEPHONE (OUTPATIENT)
Dept: SURGERY | Facility: CLINIC | Age: 87
End: 2022-09-02

## 2022-09-02 NOTE — TELEPHONE ENCOUNTER
I called pt verified name/ gave pt normal urine results, pt states she relieved that she does not have infection, does states she does have burning at the end of urination happens once in awhile but goes away.  Routed to APN, Wyona Essex

## 2022-09-12 ENCOUNTER — LAB ENCOUNTER (OUTPATIENT)
Dept: LAB | Age: 87
End: 2022-09-12
Attending: NURSE PRACTITIONER
Payer: MEDICARE

## 2022-09-12 DIAGNOSIS — N39.0 RECURRENT UTI: ICD-10-CM

## 2022-09-12 LAB
BILIRUB UR QL: NEGATIVE
COLOR UR: YELLOW
GLUCOSE UR-MCNC: NEGATIVE MG/DL
KETONES UR-MCNC: NEGATIVE MG/DL
NITRITE UR QL STRIP.AUTO: POSITIVE
PH UR: 7 [PH] (ref 5–8)
RBC #/AREA URNS AUTO: >10 /HPF
RBC #/AREA URNS AUTO: >10 /HPF
SP GR UR STRIP: 1.02 (ref 1–1.03)
UROBILINOGEN UR STRIP-ACNC: 0.2
WBC #/AREA URNS AUTO: >50 /HPF
WBC #/AREA URNS AUTO: >50 /HPF
WBC CLUMPS UR QL AUTO: PRESENT /HPF
WBC CLUMPS UR QL AUTO: PRESENT /HPF

## 2022-09-12 PROCEDURE — 87086 URINE CULTURE/COLONY COUNT: CPT

## 2022-09-12 PROCEDURE — 81001 URINALYSIS AUTO W/SCOPE: CPT

## 2022-09-12 PROCEDURE — 87186 SC STD MICRODIL/AGAR DIL: CPT

## 2022-09-12 PROCEDURE — 87077 CULTURE AEROBIC IDENTIFY: CPT

## 2022-09-12 PROCEDURE — 81015 MICROSCOPIC EXAM OF URINE: CPT

## 2022-09-14 ENCOUNTER — TELEPHONE (OUTPATIENT)
Dept: SURGERY | Facility: CLINIC | Age: 87
End: 2022-09-14

## 2022-09-14 RX ORDER — CEFADROXIL 1000 MG/1
1 TABLET ORAL 2 TIMES DAILY
Qty: 10 TABLET | Refills: 0 | Status: SHIPPED | OUTPATIENT
Start: 2022-09-14 | End: 2022-09-19

## 2022-09-14 NOTE — TELEPHONE ENCOUNTER
Patient believes she has a bladder infection, burning with urine output. Please call at [phone].

## 2022-09-14 NOTE — TELEPHONE ENCOUNTER
Discussed urine culture results with MITCHELL from 9/12/22 due to [facility] out of office and per [facility] ok for pt to take cefadroxil 1 g BID x 5 days. Order sent to [facility] pharmacy. Called pt and relayed instructions. Verbalized understanding.

## 2022-09-15 ENCOUNTER — OFFICE VISIT (OUTPATIENT)
Dept: AUDIOLOGY | Facility: CLINIC | Age: 87
End: 2022-09-15
Payer: MEDICARE

## 2022-09-15 ENCOUNTER — OFFICE VISIT (OUTPATIENT)
Dept: OTOLARYNGOLOGY | Facility: CLINIC | Age: 87
End: 2022-09-15
Payer: MEDICARE

## 2022-09-15 DIAGNOSIS — H90.3 SENSORINEURAL HEARING LOSS, BILATERAL: Primary | ICD-10-CM

## 2022-09-15 DIAGNOSIS — H91.93 BILATERAL HEARING LOSS, UNSPECIFIED HEARING LOSS TYPE: Primary | ICD-10-CM

## 2022-09-15 DIAGNOSIS — H61.23 BILATERAL IMPACTED CERUMEN: ICD-10-CM

## 2022-09-15 PROCEDURE — 69210 REMOVE IMPACTED EAR WAX UNI: CPT | Performed by: OTOLARYNGOLOGY

## 2022-09-30 ENCOUNTER — LAB ENCOUNTER (OUTPATIENT)
Dept: LAB | Age: 87
End: 2022-09-30
Attending: NURSE PRACTITIONER
Payer: MEDICARE

## 2022-10-03 ENCOUNTER — TELEPHONE (OUTPATIENT)
Dept: SURGERY | Facility: CLINIC | Age: 87
End: 2022-10-03

## 2022-10-03 RX ORDER — NITROFURANTOIN MACROCRYSTALS 50 MG/1
50 CAPSULE ORAL NIGHTLY
Qty: 90 CAPSULE | Refills: 1 | Status: SHIPPED | OUTPATIENT
Start: 2022-10-03 | End: 2023-04-01

## 2022-10-03 RX ORDER — CEPHALEXIN 500 MG/1
500 CAPSULE ORAL 2 TIMES DAILY
Qty: 14 CAPSULE | Refills: 0 | Status: SHIPPED | OUTPATIENT
Start: 2022-10-03 | End: 2022-10-10

## 2022-10-03 NOTE — TELEPHONE ENCOUNTER
I called pt and informed her of the results and instructions as stated in [facility] as stated below and I told pt that I was also speaking with [facility] about her recurrent UTI's and [facility] asked that I ask pt if she wants to try suppressive TX for this and Pt stated that she would definitely like to try it. I told her that I will let [facility] know and when she sends the ABX will will call her to let her know.

## 2022-10-03 NOTE — TELEPHONE ENCOUNTER
Will send nitrofurantoin 50 mg PO nightly for suppressive therapy. She should start this after she has completed her treatment course.

## 2022-10-03 NOTE — TELEPHONE ENCOUNTER
----- Message from CHITRA Cho sent at 10/3/2022  8:42 AM CDT -----  Please let patient know her urine culture is positive for bacteria. I am going to send Keflex 500 mg PO BID x 7 days to her pharmacy.

## 2022-10-04 NOTE — TELEPHONE ENCOUNTER
Pt called back and explained nitrofuratoin suppresive therapy to pt. Instructed pt to follow up after taking suppressive therapy to reassess effectiveness. Verbalized understanding, states she will f/u in December.

## 2022-10-21 ENCOUNTER — TELEPHONE (OUTPATIENT)
Dept: SURGERY | Facility: CLINIC | Age: 87
End: 2022-10-21

## 2022-10-21 NOTE — TELEPHONE ENCOUNTER
Per pt states nitrofurantoin is causing head pressure and fatigue, requesting to speak to RN. Please call thank you.

## 2022-10-21 NOTE — TELEPHONE ENCOUNTER
-S/w pt; identity verified with name &    -I read message to pt from [facility] as stated below.  -Pt plans to not take Nitrofurantoin for the weekend, then call her PCP if the \"head pressure\" persists.  -Will resume Rx nightly on Monday 10/24/22.  -Encounter complete.

## 2022-10-21 NOTE — TELEPHONE ENCOUNTER
These are not common adverse effects of nitrofurantoin, and  She has taken the medication several times in the past.  Margoth Denises to hold for now, however if symptoms do not improve she should contact her pcp

## 2022-10-21 NOTE — TELEPHONE ENCOUNTER
Routed to SCOTT Fonseca APN:  -S/w pt; identity verified with name & . Please review & advise. Thank Suzy Amarilis    -On 10/3/22, pt prescribed Nitrofurantoin 50mg  X1tab nightly.    -Pt reports she experienced \"head pressure & feeling tired all day yesterday (10/20/22). \"  Therefore, she \"did not take Nitrofurantoin last evening. I woke up w/o the pressure but then it started up again. \"    -Pt is questioning if she should continue to take the Nitrofurantoin.    -Outcome pending.

## 2022-10-26 ENCOUNTER — APPOINTMENT (OUTPATIENT)
Dept: HEMATOLOGY/ONCOLOGY | Facility: HOSPITAL | Age: 87
End: 2022-10-26
Attending: INTERNAL MEDICINE
Payer: MEDICARE

## 2022-10-28 ENCOUNTER — OFFICE VISIT (OUTPATIENT)
Dept: HEMATOLOGY/ONCOLOGY | Facility: HOSPITAL | Age: 87
End: 2022-10-28
Attending: INTERNAL MEDICINE
Payer: MEDICARE

## 2022-10-28 VITALS
HEIGHT: 60 IN | SYSTOLIC BLOOD PRESSURE: 149 MMHG | TEMPERATURE: 99 F | BODY MASS INDEX: 19.44 KG/M2 | RESPIRATION RATE: 18 BRPM | OXYGEN SATURATION: 97 % | HEART RATE: 75 BPM | DIASTOLIC BLOOD PRESSURE: 75 MMHG | WEIGHT: 99 LBS

## 2022-10-28 DIAGNOSIS — Z85.3 ENCOUNTER FOR FOLLOW-UP SURVEILLANCE OF BREAST CANCER: ICD-10-CM

## 2022-10-28 DIAGNOSIS — Z08 ENCOUNTER FOR FOLLOW-UP SURVEILLANCE OF BREAST CANCER: ICD-10-CM

## 2022-10-28 DIAGNOSIS — Z85.3 HISTORY OF BREAST CANCER: Primary | ICD-10-CM

## 2022-10-28 PROCEDURE — 99213 OFFICE O/P EST LOW 20 MIN: CPT | Performed by: INTERNAL MEDICINE

## 2022-11-16 ENCOUNTER — HOSPITAL ENCOUNTER (OUTPATIENT)
Dept: GENERAL RADIOLOGY | Age: 87
Discharge: HOME | End: 2022-11-16
Attending: INTERNAL MEDICINE
Payer: MEDICARE

## 2022-11-16 DIAGNOSIS — M47.812 CERVICAL SPONDYLOSIS: ICD-10-CM

## 2022-11-16 PROCEDURE — 72050 X-RAY EXAM NECK SPINE 4/5VWS: CPT | Performed by: INTERNAL MEDICINE

## 2022-12-05 ENCOUNTER — OFFICE VISIT (OUTPATIENT)
Dept: SURGERY | Facility: CLINIC | Age: 87
End: 2022-12-05
Payer: MEDICARE

## 2022-12-05 DIAGNOSIS — D17.71 RENAL ANGIOMYOLIPOMA: ICD-10-CM

## 2022-12-05 DIAGNOSIS — N39.0 RECURRENT UTI: Primary | ICD-10-CM

## 2022-12-05 PROCEDURE — 99213 OFFICE O/P EST LOW 20 MIN: CPT | Performed by: NURSE PRACTITIONER

## 2023-03-30 ENCOUNTER — HOSPITAL ENCOUNTER (INPATIENT)
Facility: HOSPITAL | Age: 88
LOS: 5 days | Discharge: HOME HEALTH | End: 2023-04-05
Attending: EMERGENCY MEDICINE | Admitting: HOSPITALIST
Payer: MEDICARE

## 2023-03-30 ENCOUNTER — APPOINTMENT (OUTPATIENT)
Dept: GENERAL RADIOLOGY | Facility: HOSPITAL | Age: 88
End: 2023-03-30
Payer: MEDICARE

## 2023-03-30 DIAGNOSIS — S83.91XA SPRAIN OF RIGHT KNEE, UNSPECIFIED LIGAMENT, INITIAL ENCOUNTER: Primary | ICD-10-CM

## 2023-03-30 DIAGNOSIS — M17.11 ARTHRITIS OF KNEE, RIGHT: ICD-10-CM

## 2023-03-30 PROCEDURE — 73560 X-RAY EXAM OF KNEE 1 OR 2: CPT

## 2023-03-30 RX ORDER — ACETAMINOPHEN 325 MG/1
650 TABLET ORAL ONCE
Status: COMPLETED | OUTPATIENT
Start: 2023-03-30 | End: 2023-03-30

## 2023-03-31 ENCOUNTER — APPOINTMENT (OUTPATIENT)
Dept: CT IMAGING | Facility: HOSPITAL | Age: 88
End: 2023-03-31
Attending: EMERGENCY MEDICINE
Payer: MEDICARE

## 2023-03-31 PROBLEM — M17.11 ARTHRITIS OF KNEE, RIGHT: Status: ACTIVE | Noted: 2023-03-31

## 2023-03-31 PROBLEM — M25.461 EFFUSION OF RIGHT KNEE: Status: ACTIVE | Noted: 2023-03-31

## 2023-03-31 LAB
ANION GAP SERPL CALC-SCNC: 4 MMOL/L (ref 0–18)
APTT PPP: 25.7 SECONDS (ref 23.3–35.6)
BASOPHILS # BLD AUTO: 0.04 X10(3) UL (ref 0–0.2)
BASOPHILS NFR BLD AUTO: 0.4 %
BASOPHILS NFR SNV: 0 %
BUN BLD-MCNC: 12 MG/DL (ref 7–18)
BUN/CREAT SERPL: 16.9 (ref 10–20)
CALCIUM BLD-MCNC: 9.1 MG/DL (ref 8.5–10.1)
CHLORIDE SERPL-SCNC: 106 MMOL/L (ref 98–112)
CO2 SERPL-SCNC: 27 MMOL/L (ref 21–32)
CREAT BLD-MCNC: 0.71 MG/DL
DEPRECATED RDW RBC AUTO: 46.1 FL (ref 35.1–46.3)
EOSINOPHIL # BLD AUTO: 0.24 X10(3) UL (ref 0–0.7)
EOSINOPHIL NFR BLD AUTO: 2.7 %
EOSINOPHIL NFR SNV: 0 %
ERYTHROCYTE [DISTWIDTH] IN BLOOD BY AUTOMATED COUNT: 14.1 % (ref 11–15)
ERYTHROCYTE [SEDIMENTATION RATE] IN BLOOD: 45 MM/HR
EST. AVERAGE GLUCOSE BLD GHB EST-MCNC: 108 MG/DL (ref 68–126)
GFR SERPLBLD BASED ON 1.73 SQ M-ARVRAT: 77 ML/MIN/1.73M2 (ref 60–?)
GLUCOSE BLD-MCNC: 103 MG/DL (ref 70–99)
HBA1C MFR BLD: 5.4 % (ref ?–5.7)
HCT VFR BLD AUTO: 39.5 %
HGB BLD-MCNC: 13.2 G/DL
IMM GRANULOCYTES # BLD AUTO: 0.04 X10(3) UL (ref 0–1)
IMM GRANULOCYTES NFR BLD: 0.4 %
INR BLD: 1.01 (ref 0.85–1.16)
LYMPHOCYTES # BLD AUTO: 1.66 X10(3) UL (ref 1–4)
LYMPHOCYTES NFR BLD AUTO: 18.4 %
LYMPHOCYTES NFR SNV: 8 %
MCH RBC QN AUTO: 29.9 PG (ref 26–34)
MCHC RBC AUTO-ENTMCNC: 33.4 G/DL (ref 31–37)
MCV RBC AUTO: 89.6 FL
MONOCYTES # BLD AUTO: 0.65 X10(3) UL (ref 0.1–1)
MONOCYTES NFR BLD AUTO: 7.2 %
MONOS+MACROS NFR SNV: 21 %
NEUTROPHILS # BLD AUTO: 6.39 X10 (3) UL (ref 1.5–7.7)
NEUTROPHILS # BLD AUTO: 6.39 X10(3) UL (ref 1.5–7.7)
NEUTROPHILS NFR BLD AUTO: 70.9 %
NEUTROPHILS NFR FLD: 71 %
OSMOLALITY SERPL CALC.SUM OF ELEC: 284 MOSM/KG (ref 275–295)
PLATELET # BLD AUTO: 229 10(3)UL (ref 150–450)
POTASSIUM SERPL-SCNC: 4.2 MMOL/L (ref 3.5–5.1)
PROTHROMBIN TIME: 13.2 SECONDS (ref 11.6–14.8)
RBC # BLD AUTO: 4.41 X10(6)UL
RBC # FLD AUTO: ABNORMAL /CUMM (ref ?–1)
SARS-COV-2 RNA RESP QL NAA+PROBE: NOT DETECTED
SODIUM SERPL-SCNC: 137 MMOL/L (ref 136–145)
TOTAL CELLS COUNTED FLD: 100
TOTAL CELLS COUNTED SNV: 2274 /CUMM (ref 0–200)
URATE SERPL-MCNC: 3.1 MG/DL
WBC # BLD AUTO: 9 X10(3) UL (ref 4–11)
WBC # SNV: 2274 /CUMM

## 2023-03-31 PROCEDURE — 3E0U33Z INTRODUCTION OF ANTI-INFLAMMATORY INTO JOINTS, PERCUTANEOUS APPROACH: ICD-10-PCS | Performed by: ORTHOPAEDIC SURGERY

## 2023-03-31 PROCEDURE — 0S9C3ZZ DRAINAGE OF RIGHT KNEE JOINT, PERCUTANEOUS APPROACH: ICD-10-PCS | Performed by: ORTHOPAEDIC SURGERY

## 2023-03-31 PROCEDURE — 99222 1ST HOSP IP/OBS MODERATE 55: CPT | Performed by: HOSPITALIST

## 2023-03-31 PROCEDURE — 73700 CT LOWER EXTREMITY W/O DYE: CPT | Performed by: EMERGENCY MEDICINE

## 2023-03-31 PROCEDURE — 3E0U3BZ INTRODUCTION OF ANESTHETIC AGENT INTO JOINTS, PERCUTANEOUS APPROACH: ICD-10-PCS | Performed by: ORTHOPAEDIC SURGERY

## 2023-03-31 RX ORDER — AMLODIPINE BESYLATE 2.5 MG/1
2.5 TABLET ORAL DAILY
Status: DISCONTINUED | OUTPATIENT
Start: 2023-03-31 | End: 2023-04-05

## 2023-03-31 RX ORDER — KETOROLAC TROMETHAMINE 15 MG/ML
15 INJECTION, SOLUTION INTRAMUSCULAR; INTRAVENOUS EVERY 6 HOURS PRN
Status: DISPENSED | OUTPATIENT
Start: 2023-03-31 | End: 2023-04-02

## 2023-03-31 RX ORDER — AMLODIPINE BESYLATE 2.5 MG/1
2.5 TABLET ORAL ONCE
Status: COMPLETED | OUTPATIENT
Start: 2023-03-31 | End: 2023-03-31

## 2023-03-31 RX ORDER — MORPHINE SULFATE 2 MG/ML
1 INJECTION, SOLUTION INTRAMUSCULAR; INTRAVENOUS EVERY 4 HOURS PRN
Status: DISCONTINUED | OUTPATIENT
Start: 2023-03-31 | End: 2023-04-05

## 2023-03-31 RX ORDER — KETOROLAC TROMETHAMINE 30 MG/ML
30 INJECTION, SOLUTION INTRAMUSCULAR; INTRAVENOUS EVERY 6 HOURS PRN
Status: DISCONTINUED | OUTPATIENT
Start: 2023-03-31 | End: 2023-03-31

## 2023-03-31 RX ORDER — HEPARIN SODIUM 5000 [USP'U]/ML
5000 INJECTION, SOLUTION INTRAVENOUS; SUBCUTANEOUS EVERY 12 HOURS SCHEDULED
Status: DISCONTINUED | OUTPATIENT
Start: 2023-03-31 | End: 2023-04-05

## 2023-03-31 RX ORDER — MELATONIN
2000 DAILY
Status: DISCONTINUED | OUTPATIENT
Start: 2023-03-31 | End: 2023-04-05

## 2023-03-31 RX ORDER — NITROFURANTOIN MACROCRYSTALS 50 MG/1
50 CAPSULE ORAL NIGHTLY
Status: DISCONTINUED | OUTPATIENT
Start: 2023-03-31 | End: 2023-04-05

## 2023-03-31 RX ORDER — NITROFURANTOIN 25; 75 MG/1; MG/1
100 CAPSULE ORAL NIGHTLY
Status: DISCONTINUED | OUTPATIENT
Start: 2023-03-31 | End: 2023-04-02

## 2023-03-31 RX ORDER — TRIAMCINOLONE ACETONIDE 40 MG/ML
40 INJECTION, SUSPENSION INTRA-ARTICULAR; INTRAMUSCULAR ONCE
Status: COMPLETED | OUTPATIENT
Start: 2023-03-31 | End: 2023-03-31

## 2023-03-31 RX ORDER — MORPHINE SULFATE 2 MG/ML
2 INJECTION, SOLUTION INTRAMUSCULAR; INTRAVENOUS ONCE
Status: COMPLETED | OUTPATIENT
Start: 2023-03-31 | End: 2023-03-31

## 2023-03-31 RX ORDER — ACETAMINOPHEN 325 MG/1
650 TABLET ORAL EVERY 6 HOURS PRN
Status: DISCONTINUED | OUTPATIENT
Start: 2023-03-31 | End: 2023-04-05

## 2023-03-31 RX ORDER — LIDOCAINE HYDROCHLORIDE 10 MG/ML
5 INJECTION, SOLUTION EPIDURAL; INFILTRATION; INTRACAUDAL; PERINEURAL ONCE
Status: COMPLETED | OUTPATIENT
Start: 2023-03-31 | End: 2023-03-31

## 2023-03-31 RX ORDER — PAROXETINE 10 MG/1
10 TABLET, FILM COATED ORAL EVERY EVENING
Status: DISCONTINUED | OUTPATIENT
Start: 2023-03-31 | End: 2023-04-05

## 2023-03-31 RX ORDER — SODIUM CHLORIDE 9 MG/ML
INJECTION, SOLUTION INTRAVENOUS CONTINUOUS
Status: DISCONTINUED | OUTPATIENT
Start: 2023-03-31 | End: 2023-04-01

## 2023-03-31 NOTE — ED QUICK NOTES
Patient states about 5pm started having severe right knee pain. Denies injury or falls. AOx4 and family at bedside with patient. Able to ambulate with assistance but unable to bear full weight on limb without pain.

## 2023-03-31 NOTE — H&P
[facility]    PATIENT'S NAME: Michell Aguilar   ATTENDING PHYSICIAN: Ashwin Billings MD   PATIENT ACCOUNT#:   [id number]    LOCATION:  [address] RECORD #:   [id number]       YOB: 1925  ADMISSION DATE:       03/30/2023    HISTORY AND PHYSICAL EXAMINATION    DATE OF EXAMINATION:  03/31/2023    CHIEF COMPLAINT:  Right knee pain. HISTORY OF PRESENT ILLNESS:  This is a delightful 60-year-old woman who lives independently by herself. She presented to the emergency room apparently with her daughter complaining of right knee pain which started acutely about 5 p.m. on Thursday night. The patient states she was taking her shoes and socks off at the time and felt severe pain in her right knee. She said that there were no unusual movements, nothing that she has not done many times in the past.  She reports that pain was primarily in the popliteal fossa and the proximal lower leg. She denied any fall. She tried ice but had too much pain associated with it. She reports that she has generalized osteoarthritis and review of the medical record, the patient has had multiple orthopedic x-rays done which revealed degenerative changes in multiple areas including the lumbar spine. An x-ray of the left knee in April 2021 revealed mild DJD in the medial compartment. She apparently typically ambulates without the aid of an assistive device and does all of her own cooking and cleaning, etc.  She lives alone and is unable to bear weight on that left knee currently. She denies any fevers or chills. No previous history of gout. Her evaluation in the emergency room included an x-ray of the knee which revealed no acute fracture or dislocation, moderate tricompartmental degenerative changes most apparent in the lateral tibial femoral compartments.   A CT scan of the knee was performed as the patient was so uncomfortable with any weightbearing to rule out an occult fracture and none was seen.  There was a small knee joint effusion and a 1.3 x 1.2 cm Baker cyst.  The patient appeared very uncomfortable when I evaluated her after arrival on the medical floor. She reported her pain was all related to the left knee. Remainder of the workup in the emergency room included a glucose of 103, a sodium of 137, potassium 4.2, chloride 106, CO2 of 27, BUN of 12 with a creatinine 0.71, calcium 9.1, anion gap of 4. Uric acid level was 3.1.  PT, PTT, and INR were within normal limits. CBC was also essentially normal.  Ace wrap was placed, and the patient was admitted for further evaluation and treatment. Orthopedic Service was consulted through the ER. 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     MEDICATIONS:  Prior to admission included nitrofurantoin 50 mg p.o. daily, amlodipine 2.5 mg p.o. daily, vitamin D 2000 units daily, paroxetine 10 mg every evening. ALLERGIES:  Sulfa. FAMILY HISTORY:  The patient's mother  at 61 of breast cancer. Father  in his late 76s of myocardial infarction. SOCIAL HISTORY:  The patient does not smoke, drink alcohol, or use drugs. She is  and lives alone in a condo. 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 REVIEW OF SYSTEMS:  Twelve systems ere reviewed.   The patient states she has some environmental allergies with runny nose and sneezing occasionally. No cough or shortness of breath. No fever or chills. No nausea or vomiting. She said that her bowel movements tend to be somewhat soft and has at least 1 a day, sometimes more because of her IBS. She denies currently any dysuria. She said her appetite has been poor and she believes she has probably lost a few pounds. She has some difficulty swallowing and when she eats has to follow solid foods with liquids. Otherwise, she says they stack up in the back of her throat and do not go down, but she does follow this regimen and denies any recent coughing or choking. There are otherwise no additional pertinent positives or negatives on a 12-point review of systems except as listed in the History of Present Illness. PHYSICAL EXAMINATION:    VITAL SIGNS:  Temperature 97.8, pulse 70, respiratory rate 18, blood pressure 168/73, O2 saturation 97% on room air. HEENT:  Normocephalic, atraumatic. Pupils equal, reactive. Sclerae anicteric. There is no sinus tenderness. Mucous membranes were moist.  There were multiple missing and fractured teeth. The patient had upper dentures and a bridge on her bottom teeth. She was hard of hearing and had bilateral hearing aids. NECK:  Mildly stiff in all directions. LUNGS:  Essentially clear with easy respiratory excursions. No wheezes or rhonchi. HEART:  Regular rate and rhythm. Normal S1, S2.  ABDOMEN:  Soft, slightly distended. Bowel sounds were normoactive. There was no guarding or rebound tenderness. No tenderness to palpation in the abdomen at all. EXTREMITIES:  The patient had pain in the right knee. Feet were warm. Pedal pulses were palpable. There was no calf tenderness. SKIN:  Warm and dry with scattered actinic and seborrheic keratoses. NEUROLOGIC:  The patient was awake, alert, oriented x3. Hard of hearing, but no focal motor or sensory deficits.   PSYCHIATRIC:  Her mood and affect were pleasant and cooperative. BACK:  There was no costovertebral angle tenderness noted. LABORATORY DATA:  Previously mentioned. ASSESSMENT AND PLAN:    1. Right knee pain, hopefully secondary to a strain. CT scan did reveal a joint effusion. Question whether an arthrocentesis or instillation of steroid might improve her discomfort. Orthopedic consultation was obtained. The patient may require time in skilled facility before she is able to go back home since she does live alone. 2.   History of breast cancer. Follows up yearly with Oncology. 3.   Hypertension. Continue home medications. 4.   Recurrent urinary tract infections. The patient is on nitrofurantoin prophylaxis and states she has not had a UTI since she started that. We will obtain postvoid bladder scan. 5.   Mild nonfasting hyperglycemia, likely stress related. Check A1c level. 6.   DVT prophylaxis. SCDs, subcutaneous heparin. 7.   Anxiety, depression. Continue Paxil. 8.   The patient's current clinical status and proposed treatment plan was discussed with her. All of her questions were answered, and she agreed with plan of care as outlined above.     Dictated By Ambika Allison MD  d: 03/31/2023 06:27:51  t: 03/31/2023 06:50:53  Job [id number]  KJT/

## 2023-03-31 NOTE — PROGRESS NOTES
[facility] Note:  Renal Dose Adjustment for Ketorolac (TORADOL)    Arpit Smith has been prescribed Ketorolac (TORADOL) 30 mg IV every 6 hours as needed for pain x 48 hrs    Estimated Creatinine Clearance: 31.8 mL/min (based on SCr of 0.71 mg/dL). Calculated creatinine clearance is < 50 ml/min, therefore, the dose of Ketorolac (TORADOL) has been changed to 15 mg every 6 hours as needed for pain x 48 hrs per P&T approved protocol. Pharmacy will continue to follow, and if renal function improves, will resume the original order.      Thank you,  Ashlee Yun, PharmD, BCPS  Phone [phone]

## 2023-03-31 NOTE — DIETARY NOTE
Brief Nutrition Note:    Pt admitted for sprain of right knee. Pt screened at no nutrition risk at admission by RN. RD consulted to evaluate and treat pt. Chart reviewed. Discussion with RN, ate meal. Pt visited, family at bedside. Pt reports she eats 3 meals a day, cooks her own meals. Family note pt eats well and regularly but not big portions. Pt reports making bigger portions of spaghetti, stew, etc and eating for a couple of days. No changes in appetite noted. Pt reports using Ensure at home when she has it, she buys it when on sale but not daily. Usual body weight (UBW) reported 105#. Current weight 101#. EMR weight review, weight appears relatively stable. Nutrition focused physical exam (NFPE) completed, appropriate age related changes noted. Encouraged continued adequate energy and protein intake. Added oral nutritional supplement (ONS) per discussion with pt/request. Liberalized cardiac restriction. Provided coupons to use to purchase Ensure at home. Pt continues at no nutrition risk at this time. F/u per protocol. Please consult nutrition services if earlier intervention is indicated.     Wt Readings from Last 6 Encounters:  03/31/23 : 45.8 kg (101 lb)  10/28/22 : 44.9 kg (99 lb)  10/23/21 : 47.6 kg (105 lb)  09/23/21 : 47.2 kg (104 lb)  04/14/21 : 47.6 kg (105 lb)  04/06/21 : 47.6 kg (105 lb)    Percent Meals Eaten (last 6 days)     Date/Time Percent Meals Eaten (%)    03/31/23 0943 75 %        Stephania Ngo MS, BRANDON, RDN, LDN  Clinical Dietitian  P: [phone]

## 2023-03-31 NOTE — PLAN OF CARE
Patient is alert and oriented x's 4, vital signs are stable. Pain is complaining of knee pain, medication given. Plan of care  for the day explained to patient. Knee aspirated and steroid injection done at bedside by Dr Brown Doctor. Fall and safety precautions are in place, call light within reach. Frequent rounding by RN and POCT. Problem: Patient Centered Care  Goal: Patient preferences are identified and integrated in the patient's plan of care  Description: Interventions:  - What would you like us to know as we care for you?  I live by myself  - Provide timely, complete, and accurate information to patient/family  - Incorporate patient and family knowledge, values, beliefs, and cultural backgrounds into the planning and delivery of care  - Encourage patient/family to participate in care and decision-making at the level they choose  - Honor patient and family perspectives and choices  Outcome: Progressing     Problem: Patient/Family Goals  Goal: Patient/Family Long Term Goal  Description: Patient's Long Term Goal: To be discharged    Interventions:  - Monitor vital signs  - Administer medications per order  - Follow MD orders  - Diagnostics as needed  - Assist with ADLs  - Update / inform patient on plan of care  - Discharge planning  - See additional Care Plan goals for specific interventions  Outcome: Progressing  Goal: Patient/Family Short Term Goal  Description: Patient's Short Term Goal: To have less pain    Interventions:   - Monitor vital signs  - Administer medications per order  - Follow MD orders  - Diagnostics as needed  - Assist with ADLs  - Update / inform patient on plan of care  - Discharge planning  - See additional Care Plan goals for specific interventions  Outcome: Progressing     Problem: PAIN - ADULT  Goal: Verbalizes/displays adequate comfort level or patient's stated pain goal  Description: INTERVENTIONS:  - Encourage pt to monitor pain and request assistance  - Assess pain using appropriate pain scale  - Administer analgesics based on type and severity of pain and evaluate response  - Implement non-pharmacological measures as appropriate and evaluate response  - Consider cultural and social influences on pain and pain management  - Manage/alleviate anxiety  - Utilize distraction and/or relaxation techniques  - Monitor for opioid side effects  - Notify MD/LIP if interventions unsuccessful or patient reports new pain  - Anticipate increased pain with activity and pre-medicate as appropriate  Outcome: Progressing     Problem: SAFETY ADULT - FALL  Goal: Free from fall injury  Description: INTERVENTIONS:  - Assess pt frequently for physical needs  - Identify cognitive and physical deficits and behaviors that affect risk of falls.   - [place] fall precautions as indicated by assessment.  - Educate pt/family on patient safety including physical limitations  - Instruct pt to call for assistance with activity based on assessment  - Modify environment to reduce risk of injury  - Provide assistive devices as appropriate  - Consider OT/PT consult to assist with strengthening/mobility  - Encourage toileting schedule  Outcome: Progressing     Problem: DISCHARGE PLANNING  Goal: Discharge to home or other facility with appropriate resources  Description: INTERVENTIONS:  - Identify barriers to discharge w/pt and caregiver  - Include patient/family/discharge partner in discharge planning  - Arrange for needed discharge resources and transportation as appropriate  - Identify discharge learning needs (meds, wound care, etc)  - Arrange for interpreters to assist at discharge as needed  - Consider post-discharge preferences of patient/family/discharge partner  - Complete POLST form as appropriate  - Assess patient's ability to be responsible for managing their own health  - Refer to Case Management Department for coordinating discharge planning if the patient needs post-hospital services based on physician/LIP order or complex needs related to functional status, cognitive ability or social support system  Outcome: Progressing     Problem: METABOLIC/FLUID AND ELECTROLYTES - ADULT  Goal: Electrolytes maintained within normal limits  Description: INTERVENTIONS:  - Monitor labs and rhythm and assess patient for signs and symptoms of electrolyte imbalances  - Administer electrolyte replacement as ordered  - Monitor response to electrolyte replacements, including rhythm and repeat lab results as appropriate  - Fluid restriction as ordered  - Instruct patient on fluid and nutrition restrictions as appropriate  Outcome: Progressing  Goal: Hemodynamic stability and optimal renal function maintained  Description: INTERVENTIONS:  - Monitor labs and assess for signs and symptoms of volume excess or deficit  - Monitor intake, output and patient weight  - Monitor urine specific gravity, serum osmolarity and serum sodium as indicated or ordered  - Monitor response to interventions for patient's volume status, including labs, urine output, blood pressure (other measures as available)  - Encourage oral intake as appropriate  - Instruct patient on fluid and nutrition restrictions as appropriate  Outcome: Progressing

## 2023-03-31 NOTE — PLAN OF CARE
Problem: Patient Centered Care  Goal: Patient preferences are identified and integrated in the patient's plan of care  Description: Interventions:  - What would you like us to know as we care for you?  I live by myself  - Provide timely, complete, and accurate information to patient/family  - Incorporate patient and family knowledge, values, beliefs, and cultural backgrounds into the planning and delivery of care  - Encourage patient/family to participate in care and decision-making at the level they choose  - Honor patient and family perspectives and choices  Outcome: Progressing     Problem: Patient/Family Goals  Goal: Patient/Family Long Term Goal  Description: Patient's Long Term Goal: To be discharged    Interventions:  - Monitor vital signs  - Administer medications per order  - Follow MD orders  - Diagnostics as needed  - Assist with ADLs  - Update / inform patient on plan of care  - Discharge planning  - See additional Care Plan goals for specific interventions  Outcome: Progressing  Goal: Patient/Family Short Term Goal  Description: Patient's Short Term Goal: To have less pain    Interventions:   - Monitor vital signs  - Administer medications per order  - Follow MD orders  - Diagnostics as needed  - Assist with ADLs  - Update / inform patient on plan of care  - Discharge planning  - See additional Care Plan goals for specific interventions  Outcome: Progressing     Problem: PAIN - ADULT  Goal: Verbalizes/displays adequate comfort level or patient's stated pain goal  Description: INTERVENTIONS:  - Encourage pt to monitor pain and request assistance  - Assess pain using appropriate pain scale  - Administer analgesics based on type and severity of pain and evaluate response  - Implement non-pharmacological measures as appropriate and evaluate response  - Consider cultural and social influences on pain and pain management  - Manage/alleviate anxiety  - Utilize distraction and/or relaxation techniques  - Monitor for opioid side effects  - Notify MD/LIP if interventions unsuccessful or patient reports new pain  - Anticipate increased pain with activity and pre-medicate as appropriate  Outcome: Progressing     Problem: SAFETY ADULT - FALL  Goal: Free from fall injury  Description: INTERVENTIONS:  - Assess pt frequently for physical needs  - Identify cognitive and physical deficits and behaviors that affect risk of falls.   - [place] fall precautions as indicated by assessment.  - Educate pt/family on patient safety including physical limitations  - Instruct pt to call for assistance with activity based on assessment  - Modify environment to reduce risk of injury  - Provide assistive devices as appropriate  - Consider OT/PT consult to assist with strengthening/mobility  - Encourage toileting schedule  Outcome: Progressing     Problem: DISCHARGE PLANNING  Goal: Discharge to home or other facility with appropriate resources  Description: INTERVENTIONS:  - Identify barriers to discharge w/pt and caregiver  - Include patient/family/discharge partner in discharge planning  - Arrange for needed discharge resources and transportation as appropriate  - Identify discharge learning needs (meds, wound care, etc)  - Arrange for interpreters to assist at discharge as needed  - Consider post-discharge preferences of patient/family/discharge partner  - Complete POLST form as appropriate  - Assess patient's ability to be responsible for managing their own health  - Refer to Case Management Department for coordinating discharge planning if the patient needs post-hospital services based on physician/LIP order or complex needs related to functional status, cognitive ability or social support system  Outcome: Progressing     Problem: METABOLIC/FLUID AND ELECTROLYTES - ADULT  Goal: Electrolytes maintained within normal limits  Description: INTERVENTIONS:  - Monitor labs and rhythm and assess patient for signs and symptoms of electrolyte imbalances  - Administer electrolyte replacement as ordered  - Monitor response to electrolyte replacements, including rhythm and repeat lab results as appropriate  - Fluid restriction as ordered  - Instruct patient on fluid and nutrition restrictions as appropriate  Outcome: Progressing  Goal: Hemodynamic stability and optimal renal function maintained  Description: INTERVENTIONS:  - Monitor labs and assess for signs and symptoms of volume excess or deficit  - Monitor intake, output and patient weight  - Monitor urine specific gravity, serum osmolarity and serum sodium as indicated or ordered  - Monitor response to interventions for patient's volume status, including labs, urine output, blood pressure (other measures as available)  - Encourage oral intake as appropriate  - Instruct patient on fluid and nutrition restrictions as appropriate  Outcome: Progressing     Patient oriented to unit. Notified MD of R knee pain.

## 2023-03-31 NOTE — ED INITIAL ASSESSMENT (HPI)
Pt was taking her shoes off and \"something happened\" and slight pain in the right knee. Hx of arthritis and states it may be due to that however she is not sure. Pt denies any falls to knee.

## 2023-03-31 NOTE — ED QUICK NOTES
Orders for admission, patient is aware of plan and ready to go upstairs. Any questions, please call ED RN Buell Crigler at extension [phone]. Patient Covid vaccination status: Fully vaccinated     COVID Test Ordered in ED: Rapid SARS-CoV-2 by PCR    COVID Suspicion at Admission: N/A    Running Infusions:  None    Mental Status/LOC at time of transport: AOx3, unable to bear weight on right knee. Other pertinent information: Pain with ambulation.    CIWA score: N/A   NIH score:  N/A

## 2023-04-01 LAB
ANION GAP SERPL CALC-SCNC: 4 MMOL/L (ref 0–18)
BASOPHILS # BLD AUTO: 0.01 X10(3) UL (ref 0–0.2)
BASOPHILS NFR BLD AUTO: 0.2 %
BUN BLD-MCNC: 16 MG/DL (ref 7–18)
BUN/CREAT SERPL: 24.6 (ref 10–20)
CALCIUM BLD-MCNC: 9.2 MG/DL (ref 8.5–10.1)
CHLORIDE SERPL-SCNC: 108 MMOL/L (ref 98–112)
CO2 SERPL-SCNC: 27 MMOL/L (ref 21–32)
CREAT BLD-MCNC: 0.65 MG/DL
DEPRECATED RDW RBC AUTO: 45.7 FL (ref 35.1–46.3)
EOSINOPHIL # BLD AUTO: 0.01 X10(3) UL (ref 0–0.7)
EOSINOPHIL NFR BLD AUTO: 0.2 %
ERYTHROCYTE [DISTWIDTH] IN BLOOD BY AUTOMATED COUNT: 13.9 % (ref 11–15)
GFR SERPLBLD BASED ON 1.73 SQ M-ARVRAT: 80 ML/MIN/1.73M2 (ref 60–?)
GLUCOSE BLD-MCNC: 119 MG/DL (ref 70–99)
HCT VFR BLD AUTO: 39 %
HGB BLD-MCNC: 13 G/DL
IMM GRANULOCYTES # BLD AUTO: 0.02 X10(3) UL (ref 0–1)
IMM GRANULOCYTES NFR BLD: 0.3 %
LYMPHOCYTES # BLD AUTO: 1.06 X10(3) UL (ref 1–4)
LYMPHOCYTES NFR BLD AUTO: 16.9 %
MCH RBC QN AUTO: 29.8 PG (ref 26–34)
MCHC RBC AUTO-ENTMCNC: 33.3 G/DL (ref 31–37)
MCV RBC AUTO: 89.4 FL
MONOCYTES # BLD AUTO: 0.42 X10(3) UL (ref 0.1–1)
MONOCYTES NFR BLD AUTO: 6.7 %
NEUTROPHILS # BLD AUTO: 4.75 X10 (3) UL (ref 1.5–7.7)
NEUTROPHILS # BLD AUTO: 4.75 X10(3) UL (ref 1.5–7.7)
NEUTROPHILS NFR BLD AUTO: 75.7 %
OSMOLALITY SERPL CALC.SUM OF ELEC: 290 MOSM/KG (ref 275–295)
PLATELET # BLD AUTO: 222 10(3)UL (ref 150–450)
POTASSIUM SERPL-SCNC: 4.1 MMOL/L (ref 3.5–5.1)
RBC # BLD AUTO: 4.36 X10(6)UL
SODIUM SERPL-SCNC: 139 MMOL/L (ref 136–145)
WBC # BLD AUTO: 6.3 X10(3) UL (ref 4–11)

## 2023-04-01 PROCEDURE — 99233 SBSQ HOSP IP/OBS HIGH 50: CPT | Performed by: HOSPITALIST

## 2023-04-01 NOTE — PLAN OF CARE
Problem: Patient Centered Care  Goal: Patient preferences are identified and integrated in the patient's plan of care  Description: Interventions:  - What would you like us to know as we care for you?  I live by myself  - Provide timely, complete, and accurate information to patient/family  - Incorporate patient and family knowledge, values, beliefs, and cultural backgrounds into the planning and delivery of care  - Encourage patient/family to participate in care and decision-making at the level they choose  - Honor patient and family perspectives and choices  Outcome: Progressing     Problem: Patient/Family Goals  Goal: Patient/Family Long Term Goal  Description: Patient's Long Term Goal: To be discharged    Interventions:  - Monitor vital signs  - Administer medications per order  - Follow MD orders  - Diagnostics as needed  - Assist with ADLs  - Update / inform patient on plan of care  - Discharge planning  - See additional Care Plan goals for specific interventions  Outcome: Progressing  Goal: Patient/Family Short Term Goal  Description: Patient's Short Term Goal: To have less pain    Interventions:   - Monitor vital signs  - Administer medications per order  - Follow MD orders  - Diagnostics as needed  - Assist with ADLs  - Update / inform patient on plan of care  - Discharge planning  - See additional Care Plan goals for specific interventions  Outcome: Progressing     Problem: PAIN - ADULT  Goal: Verbalizes/displays adequate comfort level or patient's stated pain goal  Description: INTERVENTIONS:  - Encourage pt to monitor pain and request assistance  - Assess pain using appropriate pain scale  - Administer analgesics based on type and severity of pain and evaluate response  - Implement non-pharmacological measures as appropriate and evaluate response  - Consider cultural and social influences on pain and pain management  - Manage/alleviate anxiety  - Utilize distraction and/or relaxation techniques  - Monitor for opioid side effects  - Notify MD/LIP if interventions unsuccessful or patient reports new pain  - Anticipate increased pain with activity and pre-medicate as appropriate  Outcome: Progressing     Problem: SAFETY ADULT - FALL  Goal: Free from fall injury  Description: INTERVENTIONS:  - Assess pt frequently for physical needs  - Identify cognitive and physical deficits and behaviors that affect risk of falls.   - [place] fall precautions as indicated by assessment.  - Educate pt/family on patient safety including physical limitations  - Instruct pt to call for assistance with activity based on assessment  - Modify environment to reduce risk of injury  - Provide assistive devices as appropriate  - Consider OT/PT consult to assist with strengthening/mobility  - Encourage toileting schedule  Outcome: Progressing     Problem: DISCHARGE PLANNING  Goal: Discharge to home or other facility with appropriate resources  Description: INTERVENTIONS:  - Identify barriers to discharge w/pt and caregiver  - Include patient/family/discharge partner in discharge planning  - Arrange for needed discharge resources and transportation as appropriate  - Identify discharge learning needs (meds, wound care, etc)  - Arrange for interpreters to assist at discharge as needed  - Consider post-discharge preferences of patient/family/discharge partner  - Complete POLST form as appropriate  - Assess patient's ability to be responsible for managing their own health  - Refer to Case Management Department for coordinating discharge planning if the patient needs post-hospital services based on physician/LIP order or complex needs related to functional status, cognitive ability or social support system  Outcome: Progressing     Problem: METABOLIC/FLUID AND ELECTROLYTES - ADULT  Goal: Electrolytes maintained within normal limits  Description: INTERVENTIONS:  - Monitor labs and rhythm and assess patient for signs and symptoms of electrolyte imbalances  - Administer electrolyte replacement as ordered  - Monitor response to electrolyte replacements, including rhythm and repeat lab results as appropriate  - Fluid restriction as ordered  - Instruct patient on fluid and nutrition restrictions as appropriate  Outcome: Progressing  Goal: Hemodynamic stability and optimal renal function maintained  Description: INTERVENTIONS:  - Monitor labs and assess for signs and symptoms of volume excess or deficit  - Monitor intake, output and patient weight  - Monitor urine specific gravity, serum osmolarity and serum sodium as indicated or ordered  - Monitor response to interventions for patient's volume status, including labs, urine output, blood pressure (other measures as available)  - Encourage oral intake as appropriate  - Instruct patient on fluid and nutrition restrictions as appropriate  Outcome: Progressing     No acute changes at this time.

## 2023-04-01 NOTE — PLAN OF CARE
Patient is alert and oriented x's 4, vital signs are stable. Patient complaining of moderate pain, medication declined. PT/OT consult initiated. Fall and safety precautions are in place, call light within reach. Frequent rounding performed by RN. Problem: Patient Centered Care  Goal: Patient preferences are identified and integrated in the patient's plan of care  Description: Interventions:  - What would you like us to know as we care for you?  I live by myself  - Provide timely, complete, and accurate information to patient/family  - Incorporate patient and family knowledge, values, beliefs, and cultural backgrounds into the planning and delivery of care  - Encourage patient/family to participate in care and decision-making at the level they choose  - Honor patient and family perspectives and choices  Outcome: Progressing     Problem: Patient/Family Goals  Goal: Patient/Family Long Term Goal  Description: Patient's Long Term Goal: To be discharged    Interventions:  - Monitor vital signs  - Administer medications per order  - Follow MD orders  - Diagnostics as needed  - Assist with ADLs  - Update / inform patient on plan of care  - Discharge planning  - See additional Care Plan goals for specific interventions  Outcome: Progressing  Goal: Patient/Family Short Term Goal  Description: Patient's Short Term Goal: To have less pain    Interventions:   - Monitor vital signs  - Administer medications per order  - Follow MD orders  - Diagnostics as needed  - Assist with ADLs  - Update / inform patient on plan of care  - Discharge planning  - See additional Care Plan goals for specific interventions  Outcome: Progressing     Problem: PAIN - ADULT  Goal: Verbalizes/displays adequate comfort level or patient's stated pain goal  Description: INTERVENTIONS:  - Encourage pt to monitor pain and request assistance  - Assess pain using appropriate pain scale  - Administer analgesics based on type and severity of pain and evaluate response  - Implement non-pharmacological measures as appropriate and evaluate response  - Consider cultural and social influences on pain and pain management  - Manage/alleviate anxiety  - Utilize distraction and/or relaxation techniques  - Monitor for opioid side effects  - Notify MD/LIP if interventions unsuccessful or patient reports new pain  - Anticipate increased pain with activity and pre-medicate as appropriate  Outcome: Progressing     Problem: SAFETY ADULT - FALL  Goal: Free from fall injury  Description: INTERVENTIONS:  - Assess pt frequently for physical needs  - Identify cognitive and physical deficits and behaviors that affect risk of falls.   - [place] fall precautions as indicated by assessment.  - Educate pt/family on patient safety including physical limitations  - Instruct pt to call for assistance with activity based on assessment  - Modify environment to reduce risk of injury  - Provide assistive devices as appropriate  - Consider OT/PT consult to assist with strengthening/mobility  - Encourage toileting schedule  Outcome: Progressing     Problem: DISCHARGE PLANNING  Goal: Discharge to home or other facility with appropriate resources  Description: INTERVENTIONS:  - Identify barriers to discharge w/pt and caregiver  - Include patient/family/discharge partner in discharge planning  - Arrange for needed discharge resources and transportation as appropriate  - Identify discharge learning needs (meds, wound care, etc)  - Arrange for interpreters to assist at discharge as needed  - Consider post-discharge preferences of patient/family/discharge partner  - Complete POLST form as appropriate  - Assess patient's ability to be responsible for managing their own health  - Refer to Case Management Department for coordinating discharge planning if the patient needs post-hospital services based on physician/LIP order or complex needs related to functional status, cognitive ability or social support system  Outcome: Progressing     Problem: METABOLIC/FLUID AND ELECTROLYTES - ADULT  Goal: Electrolytes maintained within normal limits  Description: INTERVENTIONS:  - Monitor labs and rhythm and assess patient for signs and symptoms of electrolyte imbalances  - Administer electrolyte replacement as ordered  - Monitor response to electrolyte replacements, including rhythm and repeat lab results as appropriate  - Fluid restriction as ordered  - Instruct patient on fluid and nutrition restrictions as appropriate  Outcome: Progressing  Goal: Hemodynamic stability and optimal renal function maintained  Description: INTERVENTIONS:  - Monitor labs and assess for signs and symptoms of volume excess or deficit  - Monitor intake, output and patient weight  - Monitor urine specific gravity, serum osmolarity and serum sodium as indicated or ordered  - Monitor response to interventions for patient's volume status, including labs, urine output, blood pressure (other measures as available)  - Encourage oral intake as appropriate  - Instruct patient on fluid and nutrition restrictions as appropriate  Outcome: Progressing

## 2023-04-02 PROCEDURE — 99232 SBSQ HOSP IP/OBS MODERATE 35: CPT | Performed by: HOSPITALIST

## 2023-04-02 NOTE — PLAN OF CARE
Frequent rounding maintained. Patient educated on all medications administered. Bed in lowest position, bed alarm and i bed awareness activated, fall precautions in place and call light within reach at all times. All patients questions answered and needs addressed promptly. Problem: Patient Centered Care  Goal: Patient preferences are identified and integrated in the patient's plan of care  Description: Interventions:  - What would you like us to know as we care for you?  I live by myself  - Provide timely, complete, and accurate information to patient/family  - Incorporate patient and family knowledge, values, beliefs, and cultural backgrounds into the planning and delivery of care  - Encourage patient/family to participate in care and decision-making at the level they choose  - Honor patient and family perspectives and choices  Outcome: Progressing     Problem: Patient/Family Goals  Goal: Patient/Family Long Term Goal  Description: Patient's Long Term Goal: To be discharged    Interventions:  - Monitor vital signs  - Administer medications per order  - Follow MD orders  - Diagnostics as needed  - Assist with ADLs  - Update / inform patient on plan of care  - Discharge planning  - See additional Care Plan goals for specific interventions  Outcome: Progressing  Goal: Patient/Family Short Term Goal  Description: Patient's Short Term Goal: To have less pain    Interventions:   - Monitor vital signs  - Administer medications per order  - Follow MD orders  - Diagnostics as needed  - Assist with ADLs  - Update / inform patient on plan of care  - Discharge planning  - See additional Care Plan goals for specific interventions  Outcome: Progressing     Problem: PAIN - ADULT  Goal: Verbalizes/displays adequate comfort level or patient's stated pain goal  Description: INTERVENTIONS:  - Encourage pt to monitor pain and request assistance  - Assess pain using appropriate pain scale  - Administer analgesics based on type and severity of pain and evaluate response  - Implement non-pharmacological measures as appropriate and evaluate response  - Consider cultural and social influences on pain and pain management  - Manage/alleviate anxiety  - Utilize distraction and/or relaxation techniques  - Monitor for opioid side effects  - Notify MD/LIP if interventions unsuccessful or patient reports new pain  - Anticipate increased pain with activity and pre-medicate as appropriate  Outcome: Progressing     Problem: SAFETY ADULT - FALL  Goal: Free from fall injury  Description: INTERVENTIONS:  - Assess pt frequently for physical needs  - Identify cognitive and physical deficits and behaviors that affect risk of falls.   - [place] fall precautions as indicated by assessment.  - Educate pt/family on patient safety including physical limitations  - Instruct pt to call for assistance with activity based on assessment  - Modify environment to reduce risk of injury  - Provide assistive devices as appropriate  - Consider OT/PT consult to assist with strengthening/mobility  - Encourage toileting schedule  Outcome: Progressing     Problem: DISCHARGE PLANNING  Goal: Discharge to home or other facility with appropriate resources  Description: INTERVENTIONS:  - Identify barriers to discharge w/pt and caregiver  - Include patient/family/discharge partner in discharge planning  - Arrange for needed discharge resources and transportation as appropriate  - Identify discharge learning needs (meds, wound care, etc)  - Arrange for interpreters to assist at discharge as needed  - Consider post-discharge preferences of patient/family/discharge partner  - Complete POLST form as appropriate  - Assess patient's ability to be responsible for managing their own health  - Refer to Case Management Department for coordinating discharge planning if the patient needs post-hospital services based on physician/LIP order or complex needs related to functional status, cognitive ability or social support system  Outcome: Progressing     Problem: METABOLIC/FLUID AND ELECTROLYTES - ADULT  Goal: Electrolytes maintained within normal limits  Description: INTERVENTIONS:  - Monitor labs and rhythm and assess patient for signs and symptoms of electrolyte imbalances  - Administer electrolyte replacement as ordered  - Monitor response to electrolyte replacements, including rhythm and repeat lab results as appropriate  - Fluid restriction as ordered  - Instruct patient on fluid and nutrition restrictions as appropriate  Outcome: Progressing  Goal: Hemodynamic stability and optimal renal function maintained  Description: INTERVENTIONS:  - Monitor labs and assess for signs and symptoms of volume excess or deficit  - Monitor intake, output and patient weight  - Monitor urine specific gravity, serum osmolarity and serum sodium as indicated or ordered  - Monitor response to interventions for patient's volume status, including labs, urine output, blood pressure (other measures as available)  - Encourage oral intake as appropriate  - Instruct patient on fluid and nutrition restrictions as appropriate  Outcome: Progressing

## 2023-04-02 NOTE — PLAN OF CARE
Pt. Alert, oriented, vitals stable. C/o right knee pain, per ortho MRI ordered. Up in chair for lunch. Ambulating with walker. [place] care. Call light within reach, able to make needs known. Daughters at bedside, nursing update given. Problem: Patient Centered Care  Goal: Patient preferences are identified and integrated in the patient's plan of care  Description: Interventions:  - What would you like us to know as we care for you?  I live by myself  - Provide timely, complete, and accurate information to patient/family  - Incorporate patient and family knowledge, values, beliefs, and cultural backgrounds into the planning and delivery of care  - Encourage patient/family to participate in care and decision-making at the level they choose  - Honor patient and family perspectives and choices  Outcome: Progressing     Problem: Patient/Family Goals  Goal: Patient/Family Long Term Goal  Description: Patient's Long Term Goal: To be discharged    Interventions:  - Monitor vital signs  - Administer medications per order  - Follow MD orders  - Diagnostics as needed  - Assist with ADLs  - Update / inform patient on plan of care  - Discharge planning  - See additional Care Plan goals for specific interventions  Outcome: Progressing  Goal: Patient/Family Short Term Goal  Description: Patient's Short Term Goal: To have less pain    Interventions:   - Monitor vital signs  - Administer medications per order  - Follow MD orders  - Diagnostics as needed  - Assist with ADLs  - Update / inform patient on plan of care  - Discharge planning  - See additional Care Plan goals for specific interventions  Outcome: Progressing     Problem: PAIN - ADULT  Goal: Verbalizes/displays adequate comfort level or patient's stated pain goal  Description: INTERVENTIONS:  - Encourage pt to monitor pain and request assistance  - Assess pain using appropriate pain scale  - Administer analgesics based on type and severity of pain and evaluate response  - Implement non-pharmacological measures as appropriate and evaluate response  - Consider cultural and social influences on pain and pain management  - Manage/alleviate anxiety  - Utilize distraction and/or relaxation techniques  - Monitor for opioid side effects  - Notify MD/LIP if interventions unsuccessful or patient reports new pain  - Anticipate increased pain with activity and pre-medicate as appropriate  Outcome: Progressing     Problem: SAFETY ADULT - FALL  Goal: Free from fall injury  Description: INTERVENTIONS:  - Assess pt frequently for physical needs  - Identify cognitive and physical deficits and behaviors that affect risk of falls.   - [place] fall precautions as indicated by assessment.  - Educate pt/family on patient safety including physical limitations  - Instruct pt to call for assistance with activity based on assessment  - Modify environment to reduce risk of injury  - Provide assistive devices as appropriate  - Consider OT/PT consult to assist with strengthening/mobility  - Encourage toileting schedule  Outcome: Progressing     Problem: DISCHARGE PLANNING  Goal: Discharge to home or other facility with appropriate resources  Description: INTERVENTIONS:  - Identify barriers to discharge w/pt and caregiver  - Include patient/family/discharge partner in discharge planning  - Arrange for needed discharge resources and transportation as appropriate  - Identify discharge learning needs (meds, wound care, etc)  - Arrange for interpreters to assist at discharge as needed  - Consider post-discharge preferences of patient/family/discharge partner  - Complete POLST form as appropriate  - Assess patient's ability to be responsible for managing their own health  - Refer to Case Management Department for coordinating discharge planning if the patient needs post-hospital services based on physician/LIP order or complex needs related to functional status, cognitive ability or social support system  Outcome: Progressing     Problem: METABOLIC/FLUID AND ELECTROLYTES - ADULT  Goal: Electrolytes maintained within normal limits  Description: INTERVENTIONS:  - Monitor labs and rhythm and assess patient for signs and symptoms of electrolyte imbalances  - Administer electrolyte replacement as ordered  - Monitor response to electrolyte replacements, including rhythm and repeat lab results as appropriate  - Fluid restriction as ordered  - Instruct patient on fluid and nutrition restrictions as appropriate  Outcome: Progressing  Goal: Hemodynamic stability and optimal renal function maintained  Description: INTERVENTIONS:  - Monitor labs and assess for signs and symptoms of volume excess or deficit  - Monitor intake, output and patient weight  - Monitor urine specific gravity, serum osmolarity and serum sodium as indicated or ordered  - Monitor response to interventions for patient's volume status, including labs, urine output, blood pressure (other measures as available)  - Encourage oral intake as appropriate  - Instruct patient on fluid and nutrition restrictions as appropriate  Outcome: Progressing

## 2023-04-03 ENCOUNTER — APPOINTMENT (OUTPATIENT)
Dept: MRI IMAGING | Facility: HOSPITAL | Age: 88
End: 2023-04-03
Attending: ORTHOPAEDIC SURGERY
Payer: MEDICARE

## 2023-04-03 PROCEDURE — 99232 SBSQ HOSP IP/OBS MODERATE 35: CPT | Performed by: HOSPITALIST

## 2023-04-03 NOTE — PLAN OF CARE
Pt. Alert, oriented, vitals stable. Denies pain/discomfort. Able to make needs known. Sanders removed per orders, voiding trial. Call light within reach, able to make needs known. Pending MRI of right knee. Problem: Patient Centered Care  Goal: Patient preferences are identified and integrated in the patient's plan of care  Description: Interventions:  - What would you like us to know as we care for you?  I live by myself  - Provide timely, complete, and accurate information to patient/family  - Incorporate patient and family knowledge, values, beliefs, and cultural backgrounds into the planning and delivery of care  - Encourage patient/family to participate in care and decision-making at the level they choose  - Honor patient and family perspectives and choices  Outcome: Progressing     Problem: Patient/Family Goals  Goal: Patient/Family Long Term Goal  Description: Patient's Long Term Goal: To be discharged    Interventions:  - Monitor vital signs  - Administer medications per order  - Follow MD orders  - Diagnostics as needed  - Assist with ADLs  - Update / inform patient on plan of care  - Discharge planning  - See additional Care Plan goals for specific interventions  Outcome: Progressing  Goal: Patient/Family Short Term Goal  Description: Patient's Short Term Goal: To have less pain    Interventions:   - Monitor vital signs  - Administer medications per order  - Follow MD orders  - Diagnostics as needed  - Assist with ADLs  - Update / inform patient on plan of care  - Discharge planning  - See additional Care Plan goals for specific interventions  Outcome: Progressing     Problem: PAIN - ADULT  Goal: Verbalizes/displays adequate comfort level or patient's stated pain goal  Description: INTERVENTIONS:  - Encourage pt to monitor pain and request assistance  - Assess pain using appropriate pain scale  - Administer analgesics based on type and severity of pain and evaluate response  - Implement non-pharmacological measures as appropriate and evaluate response  - Consider cultural and social influences on pain and pain management  - Manage/alleviate anxiety  - Utilize distraction and/or relaxation techniques  - Monitor for opioid side effects  - Notify MD/LIP if interventions unsuccessful or patient reports new pain  - Anticipate increased pain with activity and pre-medicate as appropriate  Outcome: Progressing     Problem: SAFETY ADULT - FALL  Goal: Free from fall injury  Description: INTERVENTIONS:  - Assess pt frequently for physical needs  - Identify cognitive and physical deficits and behaviors that affect risk of falls.   - [place] fall precautions as indicated by assessment.  - Educate pt/family on patient safety including physical limitations  - Instruct pt to call for assistance with activity based on assessment  - Modify environment to reduce risk of injury  - Provide assistive devices as appropriate  - Consider OT/PT consult to assist with strengthening/mobility  - Encourage toileting schedule  Outcome: Progressing     Problem: DISCHARGE PLANNING  Goal: Discharge to home or other facility with appropriate resources  Description: INTERVENTIONS:  - Identify barriers to discharge w/pt and caregiver  - Include patient/family/discharge partner in discharge planning  - Arrange for needed discharge resources and transportation as appropriate  - Identify discharge learning needs (meds, wound care, etc)  - Arrange for interpreters to assist at discharge as needed  - Consider post-discharge preferences of patient/family/discharge partner  - Complete POLST form as appropriate  - Assess patient's ability to be responsible for managing their own health  - Refer to Case Management Department for coordinating discharge planning if the patient needs post-hospital services based on physician/LIP order or complex needs related to functional status, cognitive ability or social support system  Outcome: Progressing     Problem: METABOLIC/FLUID AND ELECTROLYTES - ADULT  Goal: Electrolytes maintained within normal limits  Description: INTERVENTIONS:  - Monitor labs and rhythm and assess patient for signs and symptoms of electrolyte imbalances  - Administer electrolyte replacement as ordered  - Monitor response to electrolyte replacements, including rhythm and repeat lab results as appropriate  - Fluid restriction as ordered  - Instruct patient on fluid and nutrition restrictions as appropriate  Outcome: Progressing  Goal: Hemodynamic stability and optimal renal function maintained  Description: INTERVENTIONS:  - Monitor labs and assess for signs and symptoms of volume excess or deficit  - Monitor intake, output and patient weight  - Monitor urine specific gravity, serum osmolarity and serum sodium as indicated or ordered  - Monitor response to interventions for patient's volume status, including labs, urine output, blood pressure (other measures as available)  - Encourage oral intake as appropriate  - Instruct patient on fluid and nutrition restrictions as appropriate  Outcome: Progressing

## 2023-04-03 NOTE — CM/SW NOTE
04/03/23 1400   CM/SW Referral Data   Referral Source    Reason for Referral Discharge planning   Informant Patient   Medical Hx   Does patient have an established PCP? Yes  Liat Himanshu)   Patient Info   Patient's Current Mental Status at Time of Assessment Alert;Oriented   Patient's [address]   Patient lives with Alone   Patient Status Prior to Admission   Independent with ADLs and Mobility Yes   Discharge Needs   Anticipated D/C needs Home health care   Choice of Post-Acute Provider   Informed patient of right to choose their preferred provider Yes     Pt discussed during nursing rounds. Dx right knee sprain. From home alone, independent at baseline. PT recommending HH w/PT at [place], pt agreeable to recommendation. [facility] referrals sent in [address] [place] [address] entered. List of accepting agencies will be needed for choice. Plan: Home w/HH pending agency choice and medical clearance. / to remain available for support and/or discharge planning.      WILVER Kramer    [phone]

## 2023-04-03 NOTE — PLAN OF CARE
Frequent rounding maintained. Patient educated on all medications administered. Bed in lowest position, bed alarm and i bed awareness activated, fall precautions in place and call light within reach at all times. All patients questions answered and needs addressed promptly. No acute changes. Problem: Patient Centered Care  Goal: Patient preferences are identified and integrated in the patient's plan of care  Description: Interventions:  - What would you like us to know as we care for you?  I live by myself  - Provide timely, complete, and accurate information to patient/family  - Incorporate patient and family knowledge, values, beliefs, and cultural backgrounds into the planning and delivery of care  - Encourage patient/family to participate in care and decision-making at the level they choose  - Honor patient and family perspectives and choices  Outcome: Progressing     Problem: Patient/Family Goals  Goal: Patient/Family Long Term Goal  Description: Patient's Long Term Goal: To be discharged    Interventions:  - Monitor vital signs  - Administer medications per order  - Follow MD orders  - Diagnostics as needed  - Assist with ADLs  - Update / inform patient on plan of care  - Discharge planning  - See additional Care Plan goals for specific interventions  Outcome: Progressing  Goal: Patient/Family Short Term Goal  Description: Patient's Short Term Goal: To have less pain    Interventions:   - Monitor vital signs  - Administer medications per order  - Follow MD orders  - Diagnostics as needed  - Assist with ADLs  - Update / inform patient on plan of care  - Discharge planning  - See additional Care Plan goals for specific interventions  Outcome: Progressing     Problem: PAIN - ADULT  Goal: Verbalizes/displays adequate comfort level or patient's stated pain goal  Description: INTERVENTIONS:  - Encourage pt to monitor pain and request assistance  - Assess pain using appropriate pain scale  - Administer analgesics based on type and severity of pain and evaluate response  - Implement non-pharmacological measures as appropriate and evaluate response  - Consider cultural and social influences on pain and pain management  - Manage/alleviate anxiety  - Utilize distraction and/or relaxation techniques  - Monitor for opioid side effects  - Notify MD/LIP if interventions unsuccessful or patient reports new pain  - Anticipate increased pain with activity and pre-medicate as appropriate  Outcome: Progressing     Problem: SAFETY ADULT - FALL  Goal: Free from fall injury  Description: INTERVENTIONS:  - Assess pt frequently for physical needs  - Identify cognitive and physical deficits and behaviors that affect risk of falls.   - [place] fall precautions as indicated by assessment.  - Educate pt/family on patient safety including physical limitations  - Instruct pt to call for assistance with activity based on assessment  - Modify environment to reduce risk of injury  - Provide assistive devices as appropriate  - Consider OT/PT consult to assist with strengthening/mobility  - Encourage toileting schedule  Outcome: Progressing     Problem: DISCHARGE PLANNING  Goal: Discharge to home or other facility with appropriate resources  Description: INTERVENTIONS:  - Identify barriers to discharge w/pt and caregiver  - Include patient/family/discharge partner in discharge planning  - Arrange for needed discharge resources and transportation as appropriate  - Identify discharge learning needs (meds, wound care, etc)  - Arrange for interpreters to assist at discharge as needed  - Consider post-discharge preferences of patient/family/discharge partner  - Complete POLST form as appropriate  - Assess patient's ability to be responsible for managing their own health  - Refer to Case Management Department for coordinating discharge planning if the patient needs post-hospital services based on physician/LIP order or complex needs related to functional status, cognitive ability or social support system  Outcome: Progressing     Problem: METABOLIC/FLUID AND ELECTROLYTES - ADULT  Goal: Electrolytes maintained within normal limits  Description: INTERVENTIONS:  - Monitor labs and rhythm and assess patient for signs and symptoms of electrolyte imbalances  - Administer electrolyte replacement as ordered  - Monitor response to electrolyte replacements, including rhythm and repeat lab results as appropriate  - Fluid restriction as ordered  - Instruct patient on fluid and nutrition restrictions as appropriate  Outcome: Progressing  Goal: Hemodynamic stability and optimal renal function maintained  Description: INTERVENTIONS:  - Monitor labs and assess for signs and symptoms of volume excess or deficit  - Monitor intake, output and patient weight  - Monitor urine specific gravity, serum osmolarity and serum sodium as indicated or ordered  - Monitor response to interventions for patient's volume status, including labs, urine output, blood pressure (other measures as available)  - Encourage oral intake as appropriate  - Instruct patient on fluid and nutrition restrictions as appropriate  Outcome: Progressing

## 2023-04-04 ENCOUNTER — APPOINTMENT (OUTPATIENT)
Dept: MRI IMAGING | Facility: HOSPITAL | Age: 88
End: 2023-04-04
Attending: ORTHOPAEDIC SURGERY
Payer: MEDICARE

## 2023-04-04 PROCEDURE — 99232 SBSQ HOSP IP/OBS MODERATE 35: CPT | Performed by: HOSPITALIST

## 2023-04-04 PROCEDURE — 73721 MRI JNT OF LWR EXTRE W/O DYE: CPT | Performed by: ORTHOPAEDIC SURGERY

## 2023-04-04 NOTE — CM/SW NOTE
Patient discussed in rounds, possible discharge today pending MRI results. Patient accepted by [facility]. Confirmed plan w/ Siddhartha Chinchilla [facility].     Norma Taveras, [address]

## 2023-04-04 NOTE — HOME CARE LIAISON
Received referral via [facility] for Home Health services. Spoke w/ patient and provided with list of [facility] providers from [address] [place] [address] Road, choice is [facility]. Agency reserved in [address] [place], [place] [address]le Road and contact information placed on AVS.Financial interest disclosure provided.  Notified Porter Talley

## 2023-04-04 NOTE — DISCHARGE INSTRUCTIONS
Sometimes managing your health at home requires assistance. The [facility] team has recognized your preference to use [facility]. They can be reached by phone at [phone]. The fax number for your reference is [phone]. A representative from the home health agency will contact you or your family to schedule your first visit.

## 2023-04-04 NOTE — PLAN OF CARE
Problem: Patient Centered Care  Goal: Patient preferences are identified and integrated in the patient's plan of care  Description: Interventions:  - What would you like us to know as we care for you?  I live by myself  - Provide timely, complete, and accurate information to patient/family  - Incorporate patient and family knowledge, values, beliefs, and cultural backgrounds into the planning and delivery of care  - Encourage patient/family to participate in care and decision-making at the level they choose  - Honor patient and family perspectives and choices  Outcome: Progressing     Problem: Patient/Family Goals  Goal: Patient/Family Long Term Goal  Description: Patient's Long Term Goal: To be discharged    Interventions:  - Monitor vital signs  - Administer medications per order  - Follow MD orders  - Diagnostics as needed  - Assist with ADLs  - Update / inform patient on plan of care  - Discharge planning  - See additional Care Plan goals for specific interventions  Outcome: Progressing  Goal: Patient/Family Short Term Goal  Description: Patient's Short Term Goal: To have less pain    Interventions:   - Monitor vital signs  - Administer medications per order  - Follow MD orders  - Diagnostics as needed  - Assist with ADLs  - Update / inform patient on plan of care  - Discharge planning  - See additional Care Plan goals for specific interventions  Outcome: Progressing     Problem: PAIN - ADULT  Goal: Verbalizes/displays adequate comfort level or patient's stated pain goal  Description: INTERVENTIONS:  - Encourage pt to monitor pain and request assistance  - Assess pain using appropriate pain scale  - Administer analgesics based on type and severity of pain and evaluate response  - Implement non-pharmacological measures as appropriate and evaluate response  - Consider cultural and social influences on pain and pain management  - Manage/alleviate anxiety  - Utilize distraction and/or relaxation techniques  - Monitor for opioid side effects  - Notify MD/LIP if interventions unsuccessful or patient reports new pain  - Anticipate increased pain with activity and pre-medicate as appropriate  Outcome: Progressing     Problem: SAFETY ADULT - FALL  Goal: Free from fall injury  Description: INTERVENTIONS:  - Assess pt frequently for physical needs  - Identify cognitive and physical deficits and behaviors that affect risk of falls.   - [place] fall precautions as indicated by assessment.  - Educate pt/family on patient safety including physical limitations  - Instruct pt to call for assistance with activity based on assessment  - Modify environment to reduce risk of injury  - Provide assistive devices as appropriate  - Consider OT/PT consult to assist with strengthening/mobility  - Encourage toileting schedule  Outcome: Progressing     Problem: DISCHARGE PLANNING  Goal: Discharge to home or other facility with appropriate resources  Description: INTERVENTIONS:  - Identify barriers to discharge w/pt and caregiver  - Include patient/family/discharge partner in discharge planning  - Arrange for needed discharge resources and transportation as appropriate  - Identify discharge learning needs (meds, wound care, etc)  - Arrange for interpreters to assist at discharge as needed  - Consider post-discharge preferences of patient/family/discharge partner  - Complete POLST form as appropriate  - Assess patient's ability to be responsible for managing their own health  - Refer to Case Management Department for coordinating discharge planning if the patient needs post-hospital services based on physician/LIP order or complex needs related to functional status, cognitive ability or social support system  Outcome: Progressing     Problem: METABOLIC/FLUID AND ELECTROLYTES - ADULT  Goal: Electrolytes maintained within normal limits  Description: INTERVENTIONS:  - Monitor labs and rhythm and assess patient for signs and symptoms of electrolyte imbalances  - Administer electrolyte replacement as ordered  - Monitor response to electrolyte replacements, including rhythm and repeat lab results as appropriate  - Fluid restriction as ordered  - Instruct patient on fluid and nutrition restrictions as appropriate  Outcome: Progressing  Goal: Hemodynamic stability and optimal renal function maintained  Description: INTERVENTIONS:  - Monitor labs and assess for signs and symptoms of volume excess or deficit  - Monitor intake, output and patient weight  - Monitor urine specific gravity, serum osmolarity and serum sodium as indicated or ordered  - Monitor response to interventions for patient's volume status, including labs, urine output, blood pressure (other measures as available)  - Encourage oral intake as appropriate  - Instruct patient on fluid and nutrition restrictions as appropriate  Outcome: Progressing     Monitoring vital signs, stable at this moment. Pain medication provided as needed. Call light within reach, bed locked in lowest position, all fall precautions in place. All needs addressed. Frequent rounding maintained by nursing staff. No acute changes at this time, MRI to be completed.

## 2023-04-04 NOTE — PLAN OF CARE
No acute changes, awaiting ortho recommendations. Problem: Patient Centered Care  Goal: Patient preferences are identified and integrated in the patient's plan of care  Description: Interventions:  - What would you like us to know as we care for you?  I live by myself  - Provide timely, complete, and accurate information to patient/family  - Incorporate patient and family knowledge, values, beliefs, and cultural backgrounds into the planning and delivery of care  - Encourage patient/family to participate in care and decision-making at the level they choose  - Honor patient and family perspectives and choices  Outcome: Progressing     Problem: Patient/Family Goals  Goal: Patient/Family Long Term Goal  Description: Patient's Long Term Goal: To be discharged    Interventions:  - Monitor vital signs  - Administer medications per order  - Follow MD orders  - Diagnostics as needed  - Assist with ADLs  - Update / inform patient on plan of care  - Discharge planning  - See additional Care Plan goals for specific interventions  Outcome: Progressing  Goal: Patient/Family Short Term Goal  Description: Patient's Short Term Goal: To have less pain    Interventions:   - Monitor vital signs  - Administer medications per order  - Follow MD orders  - Diagnostics as needed  - Assist with ADLs  - Update / inform patient on plan of care  - Discharge planning  - See additional Care Plan goals for specific interventions  Outcome: Progressing     Problem: PAIN - ADULT  Goal: Verbalizes/displays adequate comfort level or patient's stated pain goal  Description: INTERVENTIONS:  - Encourage pt to monitor pain and request assistance  - Assess pain using appropriate pain scale  - Administer analgesics based on type and severity of pain and evaluate response  - Implement non-pharmacological measures as appropriate and evaluate response  - Consider cultural and social influences on pain and pain management  - Manage/alleviate anxiety  - Utilize distraction and/or relaxation techniques  - Monitor for opioid side effects  - Notify MD/LIP if interventions unsuccessful or patient reports new pain  - Anticipate increased pain with activity and pre-medicate as appropriate  Outcome: Progressing     Problem: SAFETY ADULT - FALL  Goal: Free from fall injury  Description: INTERVENTIONS:  - Assess pt frequently for physical needs  - Identify cognitive and physical deficits and behaviors that affect risk of falls.   - [place] fall precautions as indicated by assessment.  - Educate pt/family on patient safety including physical limitations  - Instruct pt to call for assistance with activity based on assessment  - Modify environment to reduce risk of injury  - Provide assistive devices as appropriate  - Consider OT/PT consult to assist with strengthening/mobility  - Encourage toileting schedule  Outcome: Progressing     Problem: DISCHARGE PLANNING  Goal: Discharge to home or other facility with appropriate resources  Description: INTERVENTIONS:  - Identify barriers to discharge w/pt and caregiver  - Include patient/family/discharge partner in discharge planning  - Arrange for needed discharge resources and transportation as appropriate  - Identify discharge learning needs (meds, wound care, etc)  - Arrange for interpreters to assist at discharge as needed  - Consider post-discharge preferences of patient/family/discharge partner  - Complete POLST form as appropriate  - Assess patient's ability to be responsible for managing their own health  - Refer to Case Management Department for coordinating discharge planning if the patient needs post-hospital services based on physician/LIP order or complex needs related to functional status, cognitive ability or social support system  Outcome: Progressing     Problem: METABOLIC/FLUID AND ELECTROLYTES - ADULT  Goal: Electrolytes maintained within normal limits  Description: INTERVENTIONS:  - Monitor labs and rhythm and assess patient for signs and symptoms of electrolyte imbalances  - Administer electrolyte replacement as ordered  - Monitor response to electrolyte replacements, including rhythm and repeat lab results as appropriate  - Fluid restriction as ordered  - Instruct patient on fluid and nutrition restrictions as appropriate  Outcome: Progressing  Goal: Hemodynamic stability and optimal renal function maintained  Description: INTERVENTIONS:  - Monitor labs and assess for signs and symptoms of volume excess or deficit  - Monitor intake, output and patient weight  - Monitor urine specific gravity, serum osmolarity and serum sodium as indicated or ordered  - Monitor response to interventions for patient's volume status, including labs, urine output, blood pressure (other measures as available)  - Encourage oral intake as appropriate  - Instruct patient on fluid and nutrition restrictions as appropriate  Outcome: Progressing

## 2023-04-05 VITALS
OXYGEN SATURATION: 94 % | SYSTOLIC BLOOD PRESSURE: 127 MMHG | HEIGHT: 60 IN | BODY MASS INDEX: 19.83 KG/M2 | TEMPERATURE: 99 F | WEIGHT: 101 LBS | DIASTOLIC BLOOD PRESSURE: 57 MMHG | HEART RATE: 82 BPM | RESPIRATION RATE: 16 BRPM

## 2023-04-05 PROCEDURE — 99239 HOSP IP/OBS DSCHRG MGMT >30: CPT | Performed by: INTERNAL MEDICINE

## 2023-04-05 NOTE — PLAN OF CARE
Problem: Patient Centered Care  Goal: Patient preferences are identified and integrated in the patient's plan of care  Description: Interventions:  - What would you like us to know as we care for you?  I live by myself  - Provide timely, complete, and accurate information to patient/family  - Incorporate patient and family knowledge, values, beliefs, and cultural backgrounds into the planning and delivery of care  - Encourage patient/family to participate in care and decision-making at the level they choose  - Honor patient and family perspectives and choices  Outcome: Progressing     Problem: Patient/Family Goals  Goal: Patient/Family Long Term Goal  Description: Patient's Long Term Goal: To be discharged    Interventions:  - Monitor vital signs  - Administer medications per order  - Follow MD orders  - Diagnostics as needed  - Assist with ADLs  - Update / inform patient on plan of care  - Discharge planning  - See additional Care Plan goals for specific interventions  Outcome: Progressing  Goal: Patient/Family Short Term Goal  Description: Patient's Short Term Goal: To have less pain    Interventions:   - Monitor vital signs  - Administer medications per order  - Follow MD orders  - Diagnostics as needed  - Assist with ADLs  - Update / inform patient on plan of care  - Discharge planning  - See additional Care Plan goals for specific interventions  Outcome: Progressing     Problem: PAIN - ADULT  Goal: Verbalizes/displays adequate comfort level or patient's stated pain goal  Description: INTERVENTIONS:  - Encourage pt to monitor pain and request assistance  - Assess pain using appropriate pain scale  - Administer analgesics based on type and severity of pain and evaluate response  - Implement non-pharmacological measures as appropriate and evaluate response  - Consider cultural and social influences on pain and pain management  - Manage/alleviate anxiety  - Utilize distraction and/or relaxation techniques  - Monitor for opioid side effects  - Notify MD/LIP if interventions unsuccessful or patient reports new pain  - Anticipate increased pain with activity and pre-medicate as appropriate  Outcome: Progressing     Problem: SAFETY ADULT - FALL  Goal: Free from fall injury  Description: INTERVENTIONS:  - Assess pt frequently for physical needs  - Identify cognitive and physical deficits and behaviors that affect risk of falls.   - [place] fall precautions as indicated by assessment.  - Educate pt/family on patient safety including physical limitations  - Instruct pt to call for assistance with activity based on assessment  - Modify environment to reduce risk of injury  - Provide assistive devices as appropriate  - Consider OT/PT consult to assist with strengthening/mobility  - Encourage toileting schedule  Outcome: Progressing     Problem: DISCHARGE PLANNING  Goal: Discharge to home or other facility with appropriate resources  Description: INTERVENTIONS:  - Identify barriers to discharge w/pt and caregiver  - Include patient/family/discharge partner in discharge planning  - Arrange for needed discharge resources and transportation as appropriate  - Identify discharge learning needs (meds, wound care, etc)  - Arrange for interpreters to assist at discharge as needed  - Consider post-discharge preferences of patient/family/discharge partner  - Complete POLST form as appropriate  - Assess patient's ability to be responsible for managing their own health  - Refer to Case Management Department for coordinating discharge planning if the patient needs post-hospital services based on physician/LIP order or complex needs related to functional status, cognitive ability or social support system  Outcome: Progressing     Problem: METABOLIC/FLUID AND ELECTROLYTES - ADULT  Goal: Electrolytes maintained within normal limits  Description: INTERVENTIONS:  - Monitor labs and rhythm and assess patient for signs and symptoms of electrolyte imbalances  - Administer electrolyte replacement as ordered  - Monitor response to electrolyte replacements, including rhythm and repeat lab results as appropriate  - Fluid restriction as ordered  - Instruct patient on fluid and nutrition restrictions as appropriate  Outcome: Progressing  Goal: Hemodynamic stability and optimal renal function maintained  Description: INTERVENTIONS:  - Monitor labs and assess for signs and symptoms of volume excess or deficit  - Monitor intake, output and patient weight  - Monitor urine specific gravity, serum osmolarity and serum sodium as indicated or ordered  - Monitor response to interventions for patient's volume status, including labs, urine output, blood pressure (other measures as available)  - Encourage oral intake as appropriate  - Instruct patient on fluid and nutrition restrictions as appropriate  Outcome: Progressing     Monitoring vital signs, stable at this moment. Pain medication provided as needed. Call light within reach, bed locked in lowest position, all fall precautions in place. All needs addressed. Frequent rounding maintained by nursing staff. No acute changes at this time. Per orthopedics patient does not require further intervention. Possible discharge 4/5.

## 2023-04-05 NOTE — CM/SW NOTE
04/04/23 1100   Discharge disposition   Expected discharge disposition Home-Health   Post Acute Care Provider Residential   Discharge transportation Private car     Pt discussed during nursing rounds. Pt is stable for IL today. MD IL order entered. [facility] will provide RN and therapy services at [facility], liaison Rosario Cisneros notified of IL home today. Pt's daughter will provide transport at IL. Plan: Home w/[facility] today. / to remain available for support and/or discharge planning.      JANY FigueredoN    [phone]

## 2023-04-05 NOTE — PLAN OF CARE
Patient has safety precautions in place bed in the lowest position, bed alarm on, and call light within reach. Continue to monitor patient with intentional nursing rounds. Patient is sitting up in chair. Possible discharge today. Problem: Patient Centered Care  Goal: Patient preferences are identified and integrated in the patient's plan of care  Description: Interventions:  - What would you like us to know as we care for you?  I live by myself  - Provide timely, complete, and accurate information to patient/family  - Incorporate patient and family knowledge, values, beliefs, and cultural backgrounds into the planning and delivery of care  - Encourage patient/family to participate in care and decision-making at the level they choose  - Honor patient and family perspectives and choices  Outcome: Progressing     Problem: Patient/Family Goals  Goal: Patient/Family Long Term Goal  Description: Patient's Long Term Goal: To be discharged    Interventions:  - Monitor vital signs  - Administer medications per order  - Follow MD orders  - Diagnostics as needed  - Assist with ADLs  - Update / inform patient on plan of care  - Discharge planning  - See additional Care Plan goals for specific interventions  Outcome: Progressing  Goal: Patient/Family Short Term Goal  Description: Patient's Short Term Goal: To have less pain    Interventions:   - Monitor vital signs  - Administer medications per order  - Follow MD orders  - Diagnostics as needed  - Assist with ADLs  - Update / inform patient on plan of care  - Discharge planning  - See additional Care Plan goals for specific interventions  Outcome: Progressing     Problem: PAIN - ADULT  Goal: Verbalizes/displays adequate comfort level or patient's stated pain goal  Description: INTERVENTIONS:  - Encourage pt to monitor pain and request assistance  - Assess pain using appropriate pain scale  - Administer analgesics based on type and severity of pain and evaluate response  - Implement non-pharmacological measures as appropriate and evaluate response  - Consider cultural and social influences on pain and pain management  - Manage/alleviate anxiety  - Utilize distraction and/or relaxation techniques  - Monitor for opioid side effects  - Notify MD/LIP if interventions unsuccessful or patient reports new pain  - Anticipate increased pain with activity and pre-medicate as appropriate  Outcome: Progressing     Problem: SAFETY ADULT - FALL  Goal: Free from fall injury  Description: INTERVENTIONS:  - Assess pt frequently for physical needs  - Identify cognitive and physical deficits and behaviors that affect risk of falls.   - [place] fall precautions as indicated by assessment.  - Educate pt/family on patient safety including physical limitations  - Instruct pt to call for assistance with activity based on assessment  - Modify environment to reduce risk of injury  - Provide assistive devices as appropriate  - Consider OT/PT consult to assist with strengthening/mobility  - Encourage toileting schedule  Outcome: Progressing     Problem: DISCHARGE PLANNING  Goal: Discharge to home or other facility with appropriate resources  Description: INTERVENTIONS:  - Identify barriers to discharge w/pt and caregiver  - Include patient/family/discharge partner in discharge planning  - Arrange for needed discharge resources and transportation as appropriate  - Identify discharge learning needs (meds, wound care, etc)  - Arrange for interpreters to assist at discharge as needed  - Consider post-discharge preferences of patient/family/discharge partner  - Complete POLST form as appropriate  - Assess patient's ability to be responsible for managing their own health  - Refer to Case Management Department for coordinating discharge planning if the patient needs post-hospital services based on physician/LIP order or complex needs related to functional status, cognitive ability or social support system  Outcome: Progressing     Problem: METABOLIC/FLUID AND ELECTROLYTES - ADULT  Goal: Electrolytes maintained within normal limits  Description: INTERVENTIONS:  - Monitor labs and rhythm and assess patient for signs and symptoms of electrolyte imbalances  - Administer electrolyte replacement as ordered  - Monitor response to electrolyte replacements, including rhythm and repeat lab results as appropriate  - Fluid restriction as ordered  - Instruct patient on fluid and nutrition restrictions as appropriate  Outcome: Progressing  Goal: Hemodynamic stability and optimal renal function maintained  Description: INTERVENTIONS:  - Monitor labs and assess for signs and symptoms of volume excess or deficit  - Monitor intake, output and patient weight  - Monitor urine specific gravity, serum osmolarity and serum sodium as indicated or ordered  - Monitor response to interventions for patient's volume status, including labs, urine output, blood pressure (other measures as available)  - Encourage oral intake as appropriate  - Instruct patient on fluid and nutrition restrictions as appropriate  Outcome: Progressing

## 2023-04-05 NOTE — PROGRESS NOTES
Patient okay to be discharged per MD order, all discharge instructions discussed with pt at bedside, all questions answered, peripheral IV removed. Pt assisted to hospital entrance via wheelchair by this RN, pt discharged with all belongings and picked up by her daughter, stable at time of discharge.

## 2023-04-10 ENCOUNTER — TELEPHONE (OUTPATIENT)
Dept: SURGERY | Facility: CLINIC | Age: 88
End: 2023-04-10

## 2023-04-11 RX ORDER — NITROFURANTOIN MACROCRYSTALS 50 MG/1
50 CAPSULE ORAL NIGHTLY
Qty: 90 CAPSULE | Refills: 3 | Status: SHIPPED | OUTPATIENT
Start: 2023-04-11

## 2023-04-11 NOTE — TELEPHONE ENCOUNTER
[facility] calling for update on script rx nitrofurantoin. Patient has 3 pills left. Please call at [phone],[employer].

## 2023-04-11 NOTE — TELEPHONE ENCOUNTER
reyna from [facility] called to check on refill rx macrodantin 50 mg. Send to [facility].  Pt has not received a call back.   Please call

## 2023-05-31 ENCOUNTER — LAB ENCOUNTER (OUTPATIENT)
Dept: LAB | Age: 88
End: 2023-05-31
Attending: NURSE PRACTITIONER
Payer: MEDICARE

## 2023-05-31 ENCOUNTER — TELEPHONE (OUTPATIENT)
Dept: SURGERY | Facility: CLINIC | Age: 88
End: 2023-05-31

## 2023-05-31 LAB
BILIRUB UR QL: NEGATIVE
GLUCOSE UR-MCNC: NORMAL MG/DL
KETONES UR-MCNC: NEGATIVE MG/DL
LEUKOCYTE ESTERASE UR QL STRIP.AUTO: 500
NITRITE UR QL STRIP.AUTO: NEGATIVE
PH UR: 6.5 [PH] (ref 5–8)
PROT UR-MCNC: NEGATIVE MG/DL
SP GR UR STRIP: 1.01 (ref 1–1.03)
UROBILINOGEN UR STRIP-ACNC: NORMAL
WBC #/AREA URNS AUTO: >50 /HPF
WBC CLUMPS UR QL AUTO: PRESENT /HPF

## 2023-05-31 NOTE — TELEPHONE ENCOUNTER
I s/w pt and determined that she just has some mild burning with urination and frequency but denies, cloudy foul smelling urine, fever and chills, hematuria,  urgency or constipation. Taking the Nitrofurantoin 50 mg nightly. Asking to submit a urine sample and prefers to wait for abx TX till the results are available. I told her that she has standing orders for UA and C&S in place from 12/2022 that she can still use and I told her to submit a sample  ASAP and call if her symptoms worsen before the results are available.

## 2023-06-02 ENCOUNTER — TELEPHONE (OUTPATIENT)
Dept: SURGERY | Facility: CLINIC | Age: 88
End: 2023-06-02

## 2023-06-02 DIAGNOSIS — N39.0 RECURRENT UTI: Primary | ICD-10-CM

## 2023-06-02 RX ORDER — CIPROFLOXACIN 500 MG/1
500 TABLET, FILM COATED ORAL 2 TIMES DAILY
Qty: 14 TABLET | Refills: 0 | Status: SHIPPED | OUTPATIENT
Start: 2023-06-02 | End: 2023-06-05

## 2023-06-03 ENCOUNTER — HOSPITAL ENCOUNTER (OUTPATIENT)
Age: 88
Discharge: HOME | End: 2023-06-03
Payer: MEDICARE

## 2023-06-03 VITALS
OXYGEN SATURATION: 97 % | RESPIRATION RATE: 20 BRPM | DIASTOLIC BLOOD PRESSURE: 73 MMHG | HEART RATE: 93 BPM | SYSTOLIC BLOOD PRESSURE: 136 MMHG | TEMPERATURE: 98 F

## 2023-06-03 DIAGNOSIS — N30.00 ACUTE CYSTITIS WITHOUT HEMATURIA: Primary | ICD-10-CM

## 2023-06-03 RX ORDER — CEFADROXIL 500 MG/1
500 CAPSULE ORAL 2 TIMES DAILY
Qty: 14 CAPSULE | Refills: 0 | Status: SHIPPED | OUTPATIENT
Start: 2023-06-03 | End: 2023-06-05 | Stop reason: ALTCHOICE

## 2023-06-03 NOTE — DISCHARGE INSTRUCTIONS
Stop the cipro. Start the cefadroxil. Increase water intake. Call Josesito Shannon on Monday.  Go to the ED if worsening symptoms

## 2023-06-03 NOTE — ED INITIAL ASSESSMENT (HPI)
Pt on nitrofurantoin for 5 months for frequent urine infections. Pt c/o urinary symptoms burning and frequency, called PCP, was prescribed cipro. Pt states the cipro is making her feel lightheaded.  Has taken 2 doses

## 2023-06-05 ENCOUNTER — TELEPHONE (OUTPATIENT)
Dept: SURGERY | Facility: CLINIC | Age: 88
End: 2023-06-05

## 2023-06-05 RX ORDER — GRANULES FOR ORAL 3 G/1
3 POWDER ORAL
Qty: 6 G | Refills: 0 | Status: SHIPPED | OUTPATIENT
Start: 2023-06-05 | End: 2023-06-09

## 2023-06-05 NOTE — TELEPHONE ENCOUNTER
Patient calling to inform she had went to the [facility] on [place] 6/3/23  Due to feeling lightheaded and was prescribed new medication cefadroxil 500 MG Oral Cap  Please advise

## 2023-06-05 NOTE — TELEPHONE ENCOUNTER
Based on her sensitivities, I am concerned that cefadroxil will not be effective. Aside from the cipro, that is not an ideal oral antibiotic to treat this bacteria. I would like for her to stop cefadroxil, and start fosfomycin 3 g by mouth every 3 days for 2 doses. This is a powder she will mix in water. It can be sometimes expensive, typically a good rx coupon will help.

## 2023-06-05 NOTE — TELEPHONE ENCOUNTER
I s/w pt and informed her of [facility]'s response msg as stated below and she verbalized understanding and compliance. I told her to make sure to call if her symptoms do not resolve.

## 2023-06-05 NOTE — TELEPHONE ENCOUNTER
Per pt with the prescription card she can get one pack for $32 and asking what she is supposed to do to get the other packets.  Please advise

## 2023-06-21 ENCOUNTER — TELEPHONE (OUTPATIENT)
Dept: SURGERY | Facility: CLINIC | Age: 88
End: 2023-06-21

## 2023-06-21 NOTE — TELEPHONE ENCOUNTER
Patient states she has been having a lot of diarrhea and would liek to know If it can be do to medication (nitofurantoin). If so, wondering if she can discontinue.  Please advise

## 2023-06-21 NOTE — TELEPHONE ENCOUNTER
I Called pt and I determined that she started having loose stools yesterday and they have worsened today with 5-6 so far. She is wondering if this is related to the nightly Nitrofurantoin she has been on since last year in Jan. I told her that I didn't think this would cause loose stools now after she has been on it for so long but I guess it could be possible. She took an antidiarrheal med which she states helped and she also informed that she has mild nausea but has not vomited and feels very tired. She is drinking fluids. Has a HX of IBS also. She denies body aches or any other flu like symptoms. I told pt that I will send this msg to [facility] and get back to her with a response. Please advise. Nitrofurantoin may cause side effects.  Tell your doctor if any of these symptoms are severe or do not go away:  nausea  vomiting  loss of appetite  heartburn or gas  hair loss  Headache

## 2023-06-21 NOTE — TELEPHONE ENCOUNTER
I think it is unlikely, however she could hold it for a couple days to see if things improve.   If symptoms do not improve in the next 24 hours she should contact her pcp

## 2023-06-21 NOTE — TELEPHONE ENCOUNTER
I called pt back and informed her of RA's resp. msg as stated below and she verbalized understanding and compliance.

## 2023-07-28 ENCOUNTER — TELEPHONE (OUTPATIENT)
Dept: SURGERY | Facility: CLINIC | Age: 88
End: 2023-07-28

## 2023-07-28 NOTE — TELEPHONE ENCOUNTER
Pt called stating pt is taking rx. Nitrofurantoin, it is making the pt tired. Pt requesting to speak to the nurse.   Please call

## 2023-08-01 NOTE — TELEPHONE ENCOUNTER
I s/w pt and told her that I looked up the SE's for the Nitrofurantoin and that it states that less common SE's are unusual tiredness or weakness. Pt informed that she had been on this med for 3 months and stopped it 1 week ago and she still feels just as tired as before when she was taking it. I told pt that her fatigue may not be related but could possibly be r/t a UTI. I then determined that pt has slight burning with urination but no pain, and denies all other UTI symptoms. I asked her if she wanted to come in and give a urine sample so we can try a new urine test that we have which can give us a wider range of sensitivities to look at so that a better TX can be prescribed. Pt stated that she has to ask her dtr if she can bring her in and pt stated she can also collect the sample at home as she has sterile containers. I told her to call me back and let me know then if she can come in today or tomorrow.

## 2023-08-02 NOTE — TELEPHONE ENCOUNTER
Pt called to inform that she has a severe tightness in her head that is causing her to not be able to function. I asked if she had headache, dizziness or blurred vision and she denied. Pt thinks this is also related to the restart of the Nitrofurantoin she takes as suppressive TX for her recurrent UTI's. I asked her how her BP is and she said she would check it while on the phone with me and she reported 160/100 and she feels that its just high because she's nervous about the head tightness. I told her that it is possible but that I think she can hold the Nitrofurantoin for a couple days but I also feel that she needs to call here PCP's office to report this and re-check her BP before doing so to see if it is still high. I also suggested she could take a couple of Tylenol to see if it helps as it could also be sinus pressure. Pt verbalized understanding and compliance.

## 2023-08-10 ENCOUNTER — LAB ENCOUNTER (OUTPATIENT)
Dept: LAB | Age: 88
End: 2023-08-10
Attending: NURSE PRACTITIONER
Payer: MEDICARE

## 2023-08-10 LAB
BILIRUB UR QL STRIP.AUTO: NEGATIVE
CLARITY UR REFRACT.AUTO: CLEAR
COLOR UR AUTO: YELLOW
GLUCOSE UR STRIP.AUTO-MCNC: NEGATIVE MG/DL
KETONES UR STRIP.AUTO-MCNC: NEGATIVE MG/DL
LEUKOCYTE ESTERASE UR QL STRIP.AUTO: NEGATIVE
NITRITE UR QL STRIP.AUTO: NEGATIVE
PH UR STRIP.AUTO: 7 [PH] (ref 5–8)
PROT UR STRIP.AUTO-MCNC: NEGATIVE MG/DL
RBC UR QL AUTO: NEGATIVE
SP GR UR STRIP.AUTO: 1.01 (ref 1–1.03)
UROBILINOGEN UR STRIP.AUTO-MCNC: <2 MG/DL

## 2023-08-14 ENCOUNTER — TELEPHONE (OUTPATIENT)
Dept: SURGERY | Facility: CLINIC | Age: 88
End: 2023-08-14

## 2023-08-14 NOTE — TELEPHONE ENCOUNTER
- s/w pt; identity verified with name and   - read RA message to pt; pt acknowledged understanding.  - encounter complete

## 2023-08-14 NOTE — TELEPHONE ENCOUNTER
----- Message from CHITRA Neff sent at 8/14/2023 12:55 PM CDT -----  Please let patient know her urinalysis is normal.  Urine culture shows no growth. There is no evidence of an infection. Continue with plans as discussed.

## 2023-08-19 ENCOUNTER — LAB ENCOUNTER (OUTPATIENT)
Dept: LAB | Age: 88
End: 2023-08-19
Attending: NURSE PRACTITIONER
Payer: MEDICARE

## 2023-08-21 ENCOUNTER — TELEPHONE (OUTPATIENT)
Dept: SURGERY | Facility: CLINIC | Age: 88
End: 2023-08-21

## 2023-08-21 NOTE — TELEPHONE ENCOUNTER
- s/w pt; identity verified with name and   - Read RA message to pt, and pt acknowledged understanding.   - encounter complete    ----- Message from Janetta Gowers, APRN sent at 2023  9:00 AM CDT -----  Please let patient know her urinalysis is normal.  Urine culture preliminary results show no growth, I will let her know if there are any changes with the final culture.     Thank you,  Kary NEGRETE  [facility]

## 2023-08-25 ENCOUNTER — TELEPHONE (OUTPATIENT)
Dept: SURGERY | Facility: CLINIC | Age: 88
End: 2023-08-25

## 2023-08-25 NOTE — TELEPHONE ENCOUNTER
Per pt continues to have UTI symptoms even after taking medication prescribed, requesting to speak to RN. Please call thank you.

## 2023-08-25 NOTE — TELEPHONE ENCOUNTER
I s/w pt who was calling to say that she still has the burning down there. I asked if it was with urination and she states that it is there and in that whole area. She did not want to do urine test and asked if she could take AZO and I told her she could use it but sparingly because it is not good for the kidneys. She asked about cranberry pills and I told her that was fine too. She also asked for an appt with [facility] and we arranged an appt for Tues. 8/29 at 11 am.

## 2023-08-29 ENCOUNTER — TELEPHONE (OUTPATIENT)
Dept: SURGERY | Facility: CLINIC | Age: 88
End: 2023-08-29

## 2023-08-29 ENCOUNTER — OFFICE VISIT (OUTPATIENT)
Dept: SURGERY | Facility: CLINIC | Age: 88
End: 2023-08-29

## 2023-08-29 DIAGNOSIS — N39.0 RECURRENT UTI: ICD-10-CM

## 2023-08-29 DIAGNOSIS — N95.2 ATROPHIC VAGINITIS: ICD-10-CM

## 2023-08-29 DIAGNOSIS — R30.0 DYSURIA: ICD-10-CM

## 2023-08-29 DIAGNOSIS — R33.9 URINARY RETENTION: Primary | ICD-10-CM

## 2023-08-29 LAB
BILIRUB UR QL: NEGATIVE
CLARITY UR: CLEAR
GLUCOSE UR-MCNC: NORMAL MG/DL
HGB UR QL STRIP.AUTO: NEGATIVE
KETONES UR-MCNC: NEGATIVE MG/DL
LEUKOCYTE ESTERASE UR QL STRIP.AUTO: NEGATIVE
NITRITE UR QL STRIP.AUTO: NEGATIVE
PH UR: 6.5 [PH] (ref 5–8)
PROT UR-MCNC: NEGATIVE MG/DL
SP GR UR STRIP: 1.01 (ref 1–1.03)
UROBILINOGEN UR STRIP-ACNC: NORMAL

## 2023-08-29 PROCEDURE — 99214 OFFICE O/P EST MOD 30 MIN: CPT | Performed by: NURSE PRACTITIONER

## 2023-08-29 PROCEDURE — 51701 INSERT BLADDER CATHETER: CPT | Performed by: NURSE PRACTITIONER

## 2023-08-29 PROCEDURE — 51798 US URINE CAPACITY MEASURE: CPT | Performed by: NURSE PRACTITIONER

## 2023-08-30 ENCOUNTER — NURSE ONLY (OUTPATIENT)
Dept: SURGERY | Facility: CLINIC | Age: 88
End: 2023-08-30

## 2023-08-30 ENCOUNTER — TELEPHONE (OUTPATIENT)
Dept: SURGERY | Facility: CLINIC | Age: 88
End: 2023-08-30

## 2023-08-30 VITALS — DIASTOLIC BLOOD PRESSURE: 75 MMHG | RESPIRATION RATE: 16 BRPM | SYSTOLIC BLOOD PRESSURE: 143 MMHG | HEART RATE: 93 BPM

## 2023-08-30 DIAGNOSIS — R33.9 URINARY RETENTION: Primary | ICD-10-CM

## 2023-08-30 LAB — NON GYNE INTERPRETATION: NEGATIVE

## 2023-08-30 PROCEDURE — 51798 US URINE CAPACITY MEASURE: CPT | Performed by: NURSE PRACTITIONER

## 2023-08-31 ENCOUNTER — NURSE ONLY (OUTPATIENT)
Dept: SURGERY | Facility: CLINIC | Age: 88
End: 2023-08-31

## 2023-08-31 ENCOUNTER — TELEPHONE (OUTPATIENT)
Dept: SURGERY | Facility: CLINIC | Age: 88
End: 2023-08-31

## 2023-08-31 VITALS — HEART RATE: 70 BPM | SYSTOLIC BLOOD PRESSURE: 122 MMHG | RESPIRATION RATE: 16 BRPM | DIASTOLIC BLOOD PRESSURE: 68 MMHG

## 2023-08-31 DIAGNOSIS — R33.9 URINARY RETENTION: Primary | ICD-10-CM

## 2023-08-31 PROCEDURE — 99211 OFF/OP EST MAY X REQ PHY/QHP: CPT | Performed by: NURSE PRACTITIONER

## 2023-08-31 RX ORDER — ESTRADIOL 0.1 MG/G
CREAM VAGINAL
Qty: 42.5 G | Refills: 3 | Status: SHIPPED | OUTPATIENT
Start: 2023-08-31

## 2023-09-01 ENCOUNTER — TELEPHONE (OUTPATIENT)
Dept: SURGERY | Facility: CLINIC | Age: 88
End: 2023-09-01

## 2023-09-21 ENCOUNTER — HOSPITAL ENCOUNTER (OUTPATIENT)
Dept: ULTRASOUND IMAGING | Facility: HOSPITAL | Age: 88
Discharge: HOME | End: 2023-09-21
Attending: NURSE PRACTITIONER
Payer: MEDICARE

## 2023-09-21 DIAGNOSIS — R30.0 DYSURIA: ICD-10-CM

## 2023-09-21 DIAGNOSIS — N95.2 ATROPHIC VAGINITIS: ICD-10-CM

## 2023-09-21 DIAGNOSIS — N39.0 RECURRENT UTI: ICD-10-CM

## 2023-09-21 DIAGNOSIS — R33.9 URINARY RETENTION: ICD-10-CM

## 2023-09-21 PROCEDURE — 76770 US EXAM ABDO BACK WALL COMP: CPT | Performed by: NURSE PRACTITIONER

## 2023-09-25 ENCOUNTER — TELEPHONE (OUTPATIENT)
Dept: SURGERY | Facility: CLINIC | Age: 88
End: 2023-09-25

## 2023-09-25 NOTE — TELEPHONE ENCOUNTER
Her ultrasound shows stable findings. Her post void residual is 232 ml which is actually better then in the past.  Her kidneys do not show any evidence of hydronephrosis or concerning abnormality. I would not recommend any changes at this time. I hope she is doing well.

## 2023-09-27 NOTE — TELEPHONE ENCOUNTER
I called the patient and confirmed her full name and . I read Megha's message to her and the patient was very happy that she does not need a catheter. I told the patient that she should call us if she has any other questions or problems. Encounter closed.

## 2023-10-30 ENCOUNTER — OFFICE VISIT (OUTPATIENT)
Dept: HEMATOLOGY/ONCOLOGY | Facility: HOSPITAL | Age: 88
End: 2023-10-30
Attending: INTERNAL MEDICINE

## 2023-10-30 VITALS
BODY MASS INDEX: 19.91 KG/M2 | DIASTOLIC BLOOD PRESSURE: 89 MMHG | RESPIRATION RATE: 18 BRPM | TEMPERATURE: 98 F | HEART RATE: 86 BPM | OXYGEN SATURATION: 95 % | SYSTOLIC BLOOD PRESSURE: 153 MMHG | WEIGHT: 101.38 LBS | HEIGHT: 60 IN

## 2023-10-30 DIAGNOSIS — Z85.3 ENCOUNTER FOR FOLLOW-UP SURVEILLANCE OF BREAST CANCER: ICD-10-CM

## 2023-10-30 DIAGNOSIS — Z08 ENCOUNTER FOR FOLLOW-UP SURVEILLANCE OF BREAST CANCER: ICD-10-CM

## 2023-10-30 DIAGNOSIS — Z85.3 HISTORY OF BREAST CANCER: Primary | ICD-10-CM

## 2023-10-30 PROCEDURE — 99213 OFFICE O/P EST LOW 20 MIN: CPT | Performed by: INTERNAL MEDICINE

## 2023-11-09 ENCOUNTER — OFFICE VISIT (OUTPATIENT)
Dept: AUDIOLOGY | Facility: CLINIC | Age: 88
End: 2023-11-09

## 2023-11-09 ENCOUNTER — OFFICE VISIT (OUTPATIENT)
Dept: OTOLARYNGOLOGY | Facility: CLINIC | Age: 88
End: 2023-11-09

## 2023-11-09 DIAGNOSIS — H91.90 HEARING LOSS, UNSPECIFIED HEARING LOSS TYPE, UNSPECIFIED LATERALITY: Primary | ICD-10-CM

## 2023-11-09 DIAGNOSIS — H90.6 MIXED HEARING LOSS, BILATERAL: Primary | ICD-10-CM

## 2023-11-09 DIAGNOSIS — H61.23 BILATERAL IMPACTED CERUMEN: ICD-10-CM

## 2023-11-09 PROCEDURE — 92593 HEARING AID CHECK, BOTH EARS: CPT | Performed by: AUDIOLOGIST

## 2023-11-09 PROCEDURE — 69210 REMOVE IMPACTED EAR WAX UNI: CPT | Performed by: OTOLARYNGOLOGY

## 2023-11-09 NOTE — PROGRESS NOTES
Colten Vargas is a 80year old female. Chief Complaint   Patient presents with    Ear Problem     Bilateral impacted cerumen and annual audiogram       HISTORY OF PRESENT ILLNESS    Patient presents for cerumen removal. No other complaints or concerns at this time    Social History     Socioeconomic History    Marital status:    Tobacco Use    Smoking status: Never    Smokeless tobacco: Never   Substance and Sexual Activity    Alcohol use: No     Alcohol/week: 0.0 standard drinks of alcohol    Drug use: No   Other Topics Concern    Caffeine Concern Yes     Comment: Coffee, 1 cup daily    Exercise No       Family History   Problem Relation Age of Onset    Heart Disease Father         Per NG: cause of death    Breast Cancer Mother 71        Per NG: Cause of death    Breast Cancer Self 66    Breast Cancer Maternal Cousin Female 80       Past Medical History:   Diagnosis Date    Acid reflux     Anxiety and depression 1/7/2018    Arthritis     Basal cell carcinoma (BCC) of skin of face 1/10/2021    Forehead, right inferior central (biopsy by shave method) + pigmented seborrheic keratosis left inferior central      Bilateral breast cancer ([address] 12/20/2013    Breast cancer ([address] 2003    Per NG:Lumpectomy    Essential hypertension     Irritable bowel syndrome     Recurrent urinary tract infection 1/7/2019    S/P ear surgery     Per NG: Ear surgery X2    Screening for breast cancer 1/7/2019    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 ENMT Negative Drooling.    Eyes Negative Blurred vision and vision changes. Respiratory Negative Dyspnea and wheezing. Cardio Negative Chest pain, irregular heartbeat/palpitations and syncope. GI Negative Abdominal pain and diarrhea. Endocrine Negative Cold intolerance and heat intolerance. Neuro Negative Tremors. Psych Negative Anxiety and depression. Integumentary Negative Frequent skin infections, pigment change and rash. Hema/Lymph Negative Easy bleeding and easy bruising. PHYSICAL EXAM    There were no vitals taken for this visit. Constitutional Normal Overall appearance - Normal.        Neck Exam Normal Inspection - Normal. Palpation - Normal. Parotid gland - Normal. Thyroid gland - Normal.             Head/Face Normal Facial features - Normal. Eyebrows - Normal. Skull - Normal.             Ears Normal Inspection - Right: Normal, Left: Normal. Canal - Right: Normal, Left: Normal. TM - Right: Normal, Left: Normal.   Skin Normal Inspection - Normal.                              Canals:  Right: Canal reveals cerumen impaction,   Left: Canal reveals cerumen impaction,     Tympanic Membranes:  Right: Normal tympanic membrane. Left: Normal tympanic membrane. TM Visualized Method:   Right TM examined via otomicroscopy. Left TM examined via otomicroscopy. PROCEDURE:    Removal of cerumen impaction   The cerumen impaction was completely removed using microscopy. Removal was completed by using acurette and/or suction. Comments: Return to clinic as needed. Avoid q-tips, water precautions and use over the counter wax remedies as needed. Current Outpatient Medications:     estradiol 0.1 MG/GM Vaginal Cream, Apply 1 gm ( fingertip amount) to the vagina nightly for 1 week then every other night indefinitely. , Disp: 42.5 g, Rfl: 3    nitrofurantoin macrocrystal 50 MG Oral Cap, Take 1 capsule (50 mg total) by mouth nightly., Disp: 90 capsule, Rfl: 3    lidocaine-menthol 4-1 % External Patch, Place 1 patch onto the skin daily. , Disp: 30 patch, Rfl: 0    amLODIPine Besylate 2.5 MG Oral Tab, TAKE 1 TABLET BY MOUTH EVERY DAY, Disp: , Rfl:     Cholecalciferol (VITAMIN D) 2000 UNITS Oral Cap, Take 1 capsule (2,000 Units total) by mouth daily. , Disp: , Rfl:     PARoxetine 10 MG Oral Tab, Take 1 tablet (10 mg total) by mouth every evening. take 1 tablet (10MG)  by ORAL route  every day, Disp: , Rfl:   ASSESSMENT AND PLAN    1. Hearing loss, unspecified hearing loss type, unspecified laterality  - OP REFERRAL TO AUDIOLOGY    2. Bilateral impacted cerumen        All cerumen was removed using microscopy. I have asked the patient to return to see me as needed for repeat cerumen removal in the future. Pam Davis.  Redd Dubois MD    11/9/2023    9:38 AM

## 2023-11-09 NOTE — PROGRESS NOTES
HEARING AID FOLLOW-UP    Colten Vargas  3/2/1925  [id number]    Patient is here for annual hearing aid check. She was seen by Dr. Meño Collazo for ear cleaning prior to appt. Hearing Aid Information  [employer] [id number]  Right #[id number]  Left #[id number]  Coupled to custom c-shells  Date Dispensed:  4-28-21  Battery: Rechargeable  Wax Guard: Cerustop     The patient has the following concerns:   Overall doing well. Patient requested that hearing aids be turned up louder. She also wants to be sure she still has reserve gain available if needed in the future. In office the following actions were taken:  Reviewed settings in [facility] and increased gain from 350Hz to 1.5Hz by 3dB. Opted to not increase higher frequencies as this led to feedback when she increases volume (which she often does to watch TV). No other program changes made. (Re-ran FB manager on left with same results as previous)      Cleaned aid  Suctioned microphone port  Changed wax guard  Verified user settings  Listening check  Hearing aids appear in good condition. No problems noted. Gave packages of spare wax guards as courtesy. Patient is to follow up in 1 year or sooner as needed.       11/9/2023  Curly Lefort, Au.D

## 2023-11-14 ENCOUNTER — LAB ENCOUNTER (OUTPATIENT)
Dept: LAB | Age: 88
End: 2023-11-14
Attending: NURSE PRACTITIONER
Payer: MEDICARE

## 2023-11-16 ENCOUNTER — TELEPHONE (OUTPATIENT)
Dept: SURGERY | Facility: CLINIC | Age: 88
End: 2023-11-16

## 2023-11-16 RX ORDER — CIPROFLOXACIN 500 MG/1
500 TABLET, FILM COATED ORAL 2 TIMES DAILY
Qty: 10 TABLET | Refills: 0 | Status: SHIPPED | OUTPATIENT
Start: 2023-11-16 | End: 2023-11-21

## 2023-11-17 ENCOUNTER — TELEPHONE (OUTPATIENT)
Dept: SURGERY | Facility: CLINIC | Age: 88
End: 2023-11-17

## 2023-11-17 RX ORDER — CEFDINIR 300 MG/1
300 CAPSULE ORAL EVERY 12 HOURS
Qty: 14 CAPSULE | Refills: 0 | Status: SHIPPED | OUTPATIENT
Start: 2023-11-17 | End: 2023-11-24

## 2023-11-17 NOTE — TELEPHONE ENCOUNTER
The patient had called saying that Cipro did not make her feel well made her feel dizzy. She is responsive to a third-generation cephalosporin based on culture and sensitivity. I have sent over oral cefdinir 300 mg twice daily for 7 days. She is taking a supplement and wanted to know if she can take that with the antibiotic. I have advised her to discuss this with the pharmacist.  All questions answered.

## 2023-11-17 NOTE — TELEPHONE ENCOUNTER
Patient calling to speak with nurse,patient indicates she is uncomfortable due to her bladder infections. Patient is not able to take rx Cipro and would like to discuss other alternatives. Please call at [phone],XA.

## 2023-11-27 ENCOUNTER — LAB ENCOUNTER (OUTPATIENT)
Dept: LAB | Age: 88
End: 2023-11-27
Attending: NURSE PRACTITIONER
Payer: MEDICARE

## 2023-11-29 ENCOUNTER — TELEPHONE (OUTPATIENT)
Dept: SURGERY | Facility: CLINIC | Age: 88
End: 2023-11-29

## 2023-11-29 NOTE — TELEPHONE ENCOUNTER
I s/w pt and asked about her symptoms and told her that her culture was neg and ua normal. She states she has some tolerable burning and some mild bladder pressure. I told her that I will let [facility] know and told her to drink plenty of fluids. Pt verbalized understanding and compliance.        URINE CULTURE No Growth 2 Days           Resulting Agency: [facility])           Specimen Collected: 11/27/23  2:09 PM Last Resulted: 11/29/23  7:50 AM

## 2023-11-29 NOTE — TELEPHONE ENCOUNTER
She hasn't seen Dr. Gilma Espana since 2019. I think she should schedule a follow up with him. Looking at Megha's notes she has tried many things without success.     Carine

## 2024-02-06 ENCOUNTER — AUDIOLOGY DOCUMENTATION (OUTPATIENT)
Dept: AUDIOLOGY | Facility: CLINIC | Age: 89
End: 2024-02-06

## 2024-02-06 NOTE — PROGRESS NOTES
Patient had left a message on Audiology Voice mail yesterday.   Called her back and spoke to her and she had complaint of static on landline phone  (audible to me on this phone call).    She does not get static on cellphone.    Advised this sounds like a phone issue and not a hearing aid issue.   Advised she check phone connections and we got cut off.    She called back on cellphone and agrees it is most likely a phone issue.       Return as needed.

## 2024-02-14 ENCOUNTER — HOSPITAL ENCOUNTER (INPATIENT)
Facility: HOSPITAL | Age: 89
LOS: 2 days | Discharge: HOME HEALTH | End: 2024-02-16
Attending: EMERGENCY MEDICINE | Admitting: HOSPITALIST
Payer: MEDICARE

## 2024-02-14 ENCOUNTER — APPOINTMENT (OUTPATIENT)
Dept: GENERAL RADIOLOGY | Facility: HOSPITAL | Age: 89
End: 2024-02-14
Attending: EMERGENCY MEDICINE
Payer: MEDICARE

## 2024-02-14 ENCOUNTER — APPOINTMENT (OUTPATIENT)
Dept: CT IMAGING | Facility: HOSPITAL | Age: 89
End: 2024-02-14
Attending: EMERGENCY MEDICINE
Payer: MEDICARE

## 2024-02-14 DIAGNOSIS — J90 BILATERAL PLEURAL EFFUSION: Primary | ICD-10-CM

## 2024-02-14 DIAGNOSIS — J18.9 COMMUNITY ACQUIRED PNEUMONIA, UNSPECIFIED LATERALITY: ICD-10-CM

## 2024-02-14 LAB
ANION GAP SERPL CALC-SCNC: 4 MMOL/L (ref 0–18)
ATRIAL RATE: 86 BPM
BASOPHILS # BLD AUTO: 0.03 X10(3) UL (ref 0–0.2)
BASOPHILS NFR BLD AUTO: 0.5 %
BNP SERPL-MCNC: 79 PG/ML
BUN BLD-MCNC: 16 MG/DL (ref 9–23)
BUN/CREAT SERPL: 25 (ref 10–20)
CALCIUM BLD-MCNC: 9 MG/DL (ref 8.7–10.4)
CHLORIDE SERPL-SCNC: 104 MMOL/L (ref 98–112)
CO2 SERPL-SCNC: 28 MMOL/L (ref 21–32)
CREAT BLD-MCNC: 0.64 MG/DL
DEPRECATED RDW RBC AUTO: 47.5 FL (ref 35.1–46.3)
EGFRCR SERPLBLD CKD-EPI 2021: 80 ML/MIN/1.73M2 (ref 60–?)
EOSINOPHIL # BLD AUTO: 0.09 X10(3) UL (ref 0–0.7)
EOSINOPHIL NFR BLD AUTO: 1.4 %
ERYTHROCYTE [DISTWIDTH] IN BLOOD BY AUTOMATED COUNT: 14.6 % (ref 11–15)
FLUAV + FLUBV RNA SPEC NAA+PROBE: NEGATIVE
FLUAV + FLUBV RNA SPEC NAA+PROBE: NEGATIVE
GLUCOSE BLD-MCNC: 91 MG/DL (ref 70–99)
HCT VFR BLD AUTO: 40 %
HGB BLD-MCNC: 13.6 G/DL
IMM GRANULOCYTES # BLD AUTO: 0.02 X10(3) UL (ref 0–1)
IMM GRANULOCYTES NFR BLD: 0.3 %
LYMPHOCYTES # BLD AUTO: 1.1 X10(3) UL (ref 1–4)
LYMPHOCYTES NFR BLD AUTO: 17.7 %
MCH RBC QN AUTO: 30.2 PG (ref 26–34)
MCHC RBC AUTO-ENTMCNC: 34 G/DL (ref 31–37)
MCV RBC AUTO: 88.9 FL
MONOCYTES # BLD AUTO: 0.49 X10(3) UL (ref 0.1–1)
MONOCYTES NFR BLD AUTO: 7.9 %
NEUTROPHILS # BLD AUTO: 4.5 X10 (3) UL (ref 1.5–7.7)
NEUTROPHILS # BLD AUTO: 4.5 X10(3) UL (ref 1.5–7.7)
NEUTROPHILS NFR BLD AUTO: 72.2 %
OSMOLALITY SERPL CALC.SUM OF ELEC: 283 MOSM/KG (ref 275–295)
P AXIS: 19 DEGREES
P-R INTERVAL: 148 MS
PLATELET # BLD AUTO: 270 10(3)UL (ref 150–450)
POTASSIUM SERPL-SCNC: 4 MMOL/L (ref 3.5–5.1)
Q-T INTERVAL: 394 MS
QRS DURATION: 68 MS
QTC CALCULATION (BEZET): 471 MS
R AXIS: 39 DEGREES
RBC # BLD AUTO: 4.5 X10(6)UL
RSV RNA SPEC NAA+PROBE: NEGATIVE
SARS-COV-2 RNA RESP QL NAA+PROBE: NOT DETECTED
SODIUM SERPL-SCNC: 136 MMOL/L (ref 136–145)
T AXIS: 10 DEGREES
VENTRICULAR RATE: 86 BPM
WBC # BLD AUTO: 6.2 X10(3) UL (ref 4–11)

## 2024-02-14 PROCEDURE — 99285 EMERGENCY DEPT VISIT HI MDM: CPT

## 2024-02-14 PROCEDURE — 71250 CT THORAX DX C-: CPT | Performed by: EMERGENCY MEDICINE

## 2024-02-14 PROCEDURE — 96375 TX/PRO/DX INJ NEW DRUG ADDON: CPT

## 2024-02-14 PROCEDURE — 36415 COLL VENOUS BLD VENIPUNCTURE: CPT

## 2024-02-14 PROCEDURE — 85025 COMPLETE CBC W/AUTO DIFF WBC: CPT | Performed by: EMERGENCY MEDICINE

## 2024-02-14 PROCEDURE — 93010 ELECTROCARDIOGRAM REPORT: CPT

## 2024-02-14 PROCEDURE — 96365 THER/PROPH/DIAG IV INF INIT: CPT

## 2024-02-14 PROCEDURE — 0241U SARS-COV-2/FLU A AND B/RSV BY PCR (GENEXPERT): CPT

## 2024-02-14 PROCEDURE — 71045 X-RAY EXAM CHEST 1 VIEW: CPT | Performed by: EMERGENCY MEDICINE

## 2024-02-14 PROCEDURE — 80048 BASIC METABOLIC PNL TOTAL CA: CPT | Performed by: EMERGENCY MEDICINE

## 2024-02-14 PROCEDURE — 83880 ASSAY OF NATRIURETIC PEPTIDE: CPT | Performed by: EMERGENCY MEDICINE

## 2024-02-14 PROCEDURE — 87040 BLOOD CULTURE FOR BACTERIA: CPT | Performed by: EMERGENCY MEDICINE

## 2024-02-14 PROCEDURE — 93005 ELECTROCARDIOGRAM TRACING: CPT

## 2024-02-14 PROCEDURE — 0241U SARS-COV-2/FLU A AND B/RSV BY PCR (GENEXPERT): CPT | Performed by: EMERGENCY MEDICINE

## 2024-02-14 RX ORDER — ACETAMINOPHEN 500 MG
500 TABLET ORAL EVERY 4 HOURS PRN
Status: DISCONTINUED | OUTPATIENT
Start: 2024-02-14 | End: 2024-02-16

## 2024-02-14 RX ORDER — FUROSEMIDE 10 MG/ML
20 INJECTION INTRAMUSCULAR; INTRAVENOUS
Status: DISCONTINUED | OUTPATIENT
Start: 2024-02-14 | End: 2024-02-16

## 2024-02-14 RX ORDER — ENOXAPARIN SODIUM 100 MG/ML
40 INJECTION SUBCUTANEOUS DAILY
Status: DISCONTINUED | OUTPATIENT
Start: 2024-02-15 | End: 2024-02-16

## 2024-02-14 RX ORDER — PAROXETINE 10 MG/1
10 TABLET, FILM COATED ORAL EVERY EVENING
Status: DISCONTINUED | OUTPATIENT
Start: 2024-02-14 | End: 2024-02-14

## 2024-02-14 RX ORDER — BISACODYL 10 MG
10 SUPPOSITORY, RECTAL RECTAL
Status: DISCONTINUED | OUTPATIENT
Start: 2024-02-14 | End: 2024-02-16

## 2024-02-14 RX ORDER — SENNOSIDES 8.6 MG
17.2 TABLET ORAL NIGHTLY PRN
Status: DISCONTINUED | OUTPATIENT
Start: 2024-02-14 | End: 2024-02-16

## 2024-02-14 RX ORDER — POLYETHYLENE GLYCOL 3350 17 G/17G
17 POWDER, FOR SOLUTION ORAL DAILY PRN
Status: DISCONTINUED | OUTPATIENT
Start: 2024-02-14 | End: 2024-02-16

## 2024-02-14 RX ORDER — ENEMA 19; 7 G/133ML; G/133ML
1 ENEMA RECTAL ONCE AS NEEDED
Status: DISCONTINUED | OUTPATIENT
Start: 2024-02-14 | End: 2024-02-16

## 2024-02-14 RX ORDER — ONDANSETRON 2 MG/ML
4 INJECTION INTRAMUSCULAR; INTRAVENOUS EVERY 6 HOURS PRN
Status: DISCONTINUED | OUTPATIENT
Start: 2024-02-14 | End: 2024-02-16

## 2024-02-14 RX ORDER — PAROXETINE 10 MG/1
10 TABLET, FILM COATED ORAL EVERY EVENING
Status: DISCONTINUED | OUTPATIENT
Start: 2024-02-14 | End: 2024-02-16

## 2024-02-14 RX ORDER — METOCLOPRAMIDE HYDROCHLORIDE 5 MG/ML
5 INJECTION INTRAMUSCULAR; INTRAVENOUS EVERY 8 HOURS PRN
Status: DISCONTINUED | OUTPATIENT
Start: 2024-02-14 | End: 2024-02-16

## 2024-02-14 NOTE — ED QUICK NOTES
Orders for admission, patient is aware of plan and ready to go upstairs. Any questions, please call ED RN usahat extension [phone].   Chief Complaint   Patient presents with    Covid-19 Test    Shortness Of Breath       Patient AO x 3  Ambulation: ambulatory  Belongings: accompanyign patient  Medications: see mar   IV: 20 g forearm  Language: english  COVID-19 suspicion level/status: negative  CIWA SCORE: na  NIH: na  Other pertinent information:  na

## 2024-02-14 NOTE — ED PROVIDER NOTES
Patient Seen in: [facility] Emergency Department      History     Chief Complaint   Patient presents with    Covid-19 Test    Shortness Of Breath     Stated Complaint: covid test    Subjective:   HPI    89-year-old female presents for evaluation for a cough for the past few months with worsening shortness of breath over the past few months.  Dyspnea is worse with exertion.  Cough is nonproductive.  She denies any fevers, chills, chest pain.  She does report lightheadedness at times.    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    Urinary tract infection 10/29/2013    Varicose veins     Per NG:Stripping and ligation              Past Surgical History:   Procedure Laterality Date    CHOLECYSTECTOMY      Cholecystitis    INNER EAR SURGERY PROC UNLISTED      LUMPECTOMY LEFT      LUMPECTOMY RIGHT      RADIATION RIGHT      2003                Social History     Socioeconomic History    Marital status:    Tobacco Use    Smoking status: Never    Smokeless tobacco: Never   Substance and Sexual Activity    Alcohol use: No     Alcohol/week: 0.0 standard drinks of alcohol    Drug use: No   Other Topics Concern    Caffeine Concern Yes     Comment: Coffee, 1 cup daily    Exercise No              Review of Systems    Positive for stated complaint: covid test  Other systems are as noted in HPI.  Constitutional and vital signs  reviewed.      All other systems reviewed and negative except as noted above.    Physical Exam     ED Triage Vitals [02/14/24 1236]   /68   Pulse 95   Resp 18   Temp 97.2 °F (36.2 °C)   Temp src Tympanic   SpO2 94 %   O2 Device None (Room air)       Current:BP (!) 179/88   Pulse 75   Temp 97.2 °F (36.2 °C) (Tympanic)   Resp 22   Ht 152.4 cm (5')   Wt 45.4 kg   SpO2 99%   BMI 19.53 kg/m²         Physical Exam  Vitals and nursing note reviewed.   Constitutional:       Appearance: Normal appearance.   HENT:      Head: Normocephalic and atraumatic.      Mouth/Throat:      Pharynx: Posterior oropharyngeal erythema present.   Cardiovascular:      Rate and Rhythm: Normal rate. Rhythm irregular.      Pulses: Normal pulses.           Radial pulses are 2+ on the right side and 2+ on the left side.      Heart sounds: Normal heart sounds.   Pulmonary:      Effort: Pulmonary effort is normal.      Breath sounds: Normal breath sounds.   Musculoskeletal:         General: Normal range of motion.      Cervical back: Normal range of motion.      Right lower leg: No edema.      Left lower leg: No edema.   Skin:     General: Skin is warm and dry.   Neurological:      General: No focal deficit present.      Mental Status: She is alert.               ED Course     Labs Reviewed   BASIC METABOLIC PANEL (8) - Abnormal; Notable for the following components:       Result Value    BUN/CREA Ratio 25.0 (*)     All other components within normal limits   CBC W/ DIFFERENTIAL - Abnormal; Notable for the following components:    RDW-SD 47.5 (*)     All other components within normal limits   BNP (B TYPE NATRIURETIC PEPTIDE) - Normal   SARS-COV-2/FLU A AND B/RSV BY PCR (GENEXPERT) - Normal    Narrative:     This test is intended for the qualitative detection and differentiation of SARS-CoV-2, influenza A, influenza B, and respiratory syncytial virus (RSV) viral RNA in nasopharyngeal or nares swabs from individuals suspected of  respiratory viral infection consistent with COVID-19 by their healthcare provider. Signs and symptoms of respiratory viral infection due to SARS-CoV-2, influenza, and RSV can be similar.    Test performed using the Xpert Xpress SARS-CoV-2/FLU/RSV (real time RT-PCR)  assay on the [employer] instrument, [employer], [place] [address].   This test is being used under the Food and Drug Administration's Emergency Use Authorization.    The authorized Fact Sheet for Healthcare Providers for this assay is available upon request from the laboratory.   CBC WITH DIFFERENTIAL WITH PLATELET    Narrative:     The following orders were created for panel order CBC With Differential With Platelet.  Procedure                               Abnormality         Status                     ---------                               -----------         ------                     CBC W/ DIFFERENTIAL[[id number]]          Abnormal            Final result                 Please view results for these tests on the individual orders.   BLOOD CULTURE   BLOOD CULTURE     EKG    Rate, intervals and axes as noted on EKG Report.  Rate: 86  Rhythm: Sinus Rhythm  Reading: no stemi, interpreted by myself              ED Course as of 02/14/24 1801  ------------------------------------------------------------  Time: 02/14 1801  Value: XR CHEST AP PORTABLE  (CPT=71045)  Comment: Per my independent interpretation, patient's CXR demonstrates bilateral pleural effusions.                [facility]        Admission disposition: 2/14/2024  5:19 PM                                        Medical Decision Making  Differential diagnosis includes pneumonia, viral syndrome, pulmonary edema, congestive heart failure    Patient CBC chemistry and BNP were normal.  EKG without acute ischemic changes.  Patient's chest x-ray shows pleural effusions possibly pneumonia.  Blood cultures were obtained.  She is started on antibiotics.  This could still be cardiac in nature.  Patient will be  admitted with pulmonology and cardiology on consultation.  She is started on Rocephin and azithromycin for possible community-acquired pneumonia.    Rhythm Strip: Rate 78 sinus The cardiac monitor was ordered secondary to the patient's dyspnea.     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 and  has a past surgical history that includes cholecystectomy; inner ear surgery proc unlisted; lumpectomy left; lumpectomy right; and radiation right. that contribute to the medical complexity of this ED evaluation.     Medical Record Review: I personally reviewed available prior medical records for any recent pertinent discharge summaries, testing, and procedures, and reviewed those reports.      Problems Addressed:  Bilateral pleural effusion: acute illness or injury with systemic symptoms  Community acquired pneumonia, unspecified laterality: acute illness or injury with systemic symptoms    Amount and/or Complexity of Data Reviewed  Labs: ordered. Decision-making details documented in ED Course.  Radiology: ordered and independent interpretation performed. Decision-making details documented in ED Course.  ECG/medicine tests: ordered and independent interpretation performed. Decision-making details documented in ED Course.  Discussion of management or test interpretation with external provider(s): Discussed with Dr. De Anda who would like IR, pulm, cards consulted.  Dr. Garcia with pulm consulted and states that they can tap the effusions if  needed, IR not necessary.  He would like a non contrast chest CT which has been ordered. Dr. Christopher with cards consulted.     Risk  Decision regarding hospitalization.        Disposition and Plan     Clinical Impression:  1. Bilateral pleural effusion    2. Community acquired pneumonia, unspecified laterality         Disposition:  Admit  2/14/2024  5:19 pm    Follow-up:  No follow-up provider specified.  We recommend that you schedule follow up care with a primary care provider within the next three months to obtain basic health screening including reassessment of your blood pressure.      Medications Prescribed:  Current Discharge Medication List                            Hospital Problems       Present on Admission  Date Reviewed: 11/9/2023            ICD-10-CM Noted POA    * (Principal) Bilateral pleural effusion J90 2/14/2024 Unknown

## 2024-02-14 NOTE — ED INITIAL ASSESSMENT (HPI)
Pt ambulatory to ED w/ family w/ c/o \"tickle in throat\" for the past 2 months and SOB. Pt's daughter stated that she had this checked out before and results were negative.   Pt states she took home covid test today and it was \"inconclusive\" so she wants to confirm the results.   Pt denies any fevers, nausea, or vomiting.

## 2024-02-15 ENCOUNTER — APPOINTMENT (OUTPATIENT)
Dept: CV DIAGNOSTICS | Facility: HOSPITAL | Age: 89
End: 2024-02-15
Attending: INTERNAL MEDICINE
Payer: MEDICARE

## 2024-02-15 LAB
ADENOVIRUS PCR:: NOT DETECTED
ANION GAP SERPL CALC-SCNC: 4 MMOL/L (ref 0–18)
B PARAPERT DNA SPEC QL NAA+PROBE: NOT DETECTED
B PERT DNA SPEC QL NAA+PROBE: NOT DETECTED
BASOPHILS # BLD AUTO: 0.02 X10(3) UL (ref 0–0.2)
BASOPHILS NFR BLD AUTO: 0.4 %
BUN BLD-MCNC: 12 MG/DL (ref 9–23)
BUN/CREAT SERPL: 21.1 (ref 10–20)
C PNEUM DNA SPEC QL NAA+PROBE: NOT DETECTED
CALCIUM BLD-MCNC: 9.1 MG/DL (ref 8.7–10.4)
CHLORIDE SERPL-SCNC: 106 MMOL/L (ref 98–112)
CO2 SERPL-SCNC: 28 MMOL/L (ref 21–32)
CORONAVIRUS 229E PCR:: NOT DETECTED
CORONAVIRUS HKU1 PCR:: NOT DETECTED
CORONAVIRUS NL63 PCR:: NOT DETECTED
CORONAVIRUS OC43 PCR:: NOT DETECTED
CREAT BLD-MCNC: 0.57 MG/DL
DEPRECATED RDW RBC AUTO: 46.3 FL (ref 35.1–46.3)
EGFRCR SERPLBLD CKD-EPI 2021: 82 ML/MIN/1.73M2 (ref 60–?)
EOSINOPHIL # BLD AUTO: 0.25 X10(3) UL (ref 0–0.7)
EOSINOPHIL NFR BLD AUTO: 4.9 %
ERYTHROCYTE [DISTWIDTH] IN BLOOD BY AUTOMATED COUNT: 14.4 % (ref 11–15)
FLUAV RNA SPEC QL NAA+PROBE: NOT DETECTED
FLUBV RNA SPEC QL NAA+PROBE: NOT DETECTED
GLUCOSE BLD-MCNC: 80 MG/DL (ref 70–99)
HCT VFR BLD AUTO: 39.1 %
HGB BLD-MCNC: 12.9 G/DL
IMM GRANULOCYTES # BLD AUTO: 0.01 X10(3) UL (ref 0–1)
IMM GRANULOCYTES NFR BLD: 0.2 %
L PNEUMO AG UR QL: NEGATIVE
LYMPHOCYTES # BLD AUTO: 1.6 X10(3) UL (ref 1–4)
LYMPHOCYTES NFR BLD AUTO: 31.1 %
MCH RBC QN AUTO: 29.2 PG (ref 26–34)
MCHC RBC AUTO-ENTMCNC: 33 G/DL (ref 31–37)
MCV RBC AUTO: 88.5 FL
METAPNEUMOVIRUS PCR:: NOT DETECTED
MONOCYTES # BLD AUTO: 0.52 X10(3) UL (ref 0.1–1)
MONOCYTES NFR BLD AUTO: 10.1 %
MYCOPLASMA PNEUMONIA PCR:: NOT DETECTED
NEUTROPHILS # BLD AUTO: 2.74 X10 (3) UL (ref 1.5–7.7)
NEUTROPHILS # BLD AUTO: 2.74 X10(3) UL (ref 1.5–7.7)
NEUTROPHILS NFR BLD AUTO: 53.3 %
OSMOLALITY SERPL CALC.SUM OF ELEC: 285 MOSM/KG (ref 275–295)
PARAINFLUENZA 1 PCR:: NOT DETECTED
PARAINFLUENZA 2 PCR:: NOT DETECTED
PARAINFLUENZA 3 PCR:: NOT DETECTED
PARAINFLUENZA 4 PCR:: NOT DETECTED
PLATELET # BLD AUTO: 224 10(3)UL (ref 150–450)
POTASSIUM SERPL-SCNC: 3.5 MMOL/L (ref 3.5–5.1)
POTASSIUM SERPL-SCNC: 3.6 MMOL/L (ref 3.5–5.1)
PROCALCITONIN SERPL-MCNC: <0.04 NG/ML (ref ?–0.05)
RBC # BLD AUTO: 4.42 X10(6)UL
RHINOVIRUS/ENTERO PCR:: NOT DETECTED
RSV RNA SPEC QL NAA+PROBE: NOT DETECTED
SARS-COV-2 RNA NPH QL NAA+NON-PROBE: NOT DETECTED
SODIUM SERPL-SCNC: 138 MMOL/L (ref 136–145)
STREP PNEUMO ANTIGEN, URINE: NEGATIVE
WBC # BLD AUTO: 5.1 X10(3) UL (ref 4–11)

## 2024-02-15 PROCEDURE — 97535 SELF CARE MNGMENT TRAINING: CPT

## 2024-02-15 PROCEDURE — 84132 ASSAY OF SERUM POTASSIUM: CPT | Performed by: HOSPITALIST

## 2024-02-15 PROCEDURE — 97116 GAIT TRAINING THERAPY: CPT

## 2024-02-15 PROCEDURE — 0202U NFCT DS 22 TRGT SARS-COV-2: CPT | Performed by: NURSE PRACTITIONER

## 2024-02-15 PROCEDURE — 87449 NOS EACH ORGANISM AG IA: CPT | Performed by: NURSE PRACTITIONER

## 2024-02-15 PROCEDURE — 97530 THERAPEUTIC ACTIVITIES: CPT

## 2024-02-15 PROCEDURE — 97165 OT EVAL LOW COMPLEX 30 MIN: CPT

## 2024-02-15 PROCEDURE — 93306 TTE W/DOPPLER COMPLETE: CPT | Performed by: INTERNAL MEDICINE

## 2024-02-15 PROCEDURE — 84145 PROCALCITONIN (PCT): CPT | Performed by: NURSE PRACTITIONER

## 2024-02-15 PROCEDURE — 97161 PT EVAL LOW COMPLEX 20 MIN: CPT

## 2024-02-15 PROCEDURE — 85025 COMPLETE CBC W/AUTO DIFF WBC: CPT | Performed by: HOSPITALIST

## 2024-02-15 PROCEDURE — 80048 BASIC METABOLIC PNL TOTAL CA: CPT | Performed by: INTERNAL MEDICINE

## 2024-02-15 RX ORDER — AMLODIPINE BESYLATE 2.5 MG/1
2.5 TABLET ORAL DAILY
Status: DISCONTINUED | OUTPATIENT
Start: 2024-02-15 | End: 2024-02-16

## 2024-02-15 RX ORDER — FLUTICASONE PROPIONATE 50 MCG
1 SPRAY, SUSPENSION (ML) NASAL DAILY
Status: DISCONTINUED | OUTPATIENT
Start: 2024-02-15 | End: 2024-02-16

## 2024-02-15 RX ORDER — POTASSIUM CHLORIDE 20 MEQ/1
40 TABLET, EXTENDED RELEASE ORAL EVERY 4 HOURS
Status: COMPLETED | OUTPATIENT
Start: 2024-02-15 | End: 2024-02-15

## 2024-02-15 RX ORDER — HYDRALAZINE HYDROCHLORIDE 25 MG/1
25 TABLET, FILM COATED ORAL EVERY 6 HOURS PRN
Status: DISCONTINUED | OUTPATIENT
Start: 2024-02-15 | End: 2024-02-16

## 2024-02-15 NOTE — PLAN OF CARE
Problem: Patient Centered Care  Goal: Patient preferences are identified and integrated in the patient's plan of care  Description: Interventions:    - Provide timely, complete, and accurate information to patient/family  - Incorporate patient and family knowledge, values, beliefs, and cultural backgrounds into the planning and delivery of care  - Encourage patient/family to participate in care and decision-making at the level they choose  - Honor patient and family perspectives and choices  Outcome: Progressing     Problem: Patient/Family Goals  Goal: Patient/Family Long Term Goal    Interventions:    - See additional Care Plan goals for specific interventions  Outcome: Progressing  Goal: Patient/Family Short Term Goal      Interventions:     - See additional Care Plan goals for specific interventions  Outcome: Progressing

## 2024-02-15 NOTE — SPIRITUAL CARE NOTE
Spiritual Care Visit Note    Patient Name: Karin May Date of Spiritual Care Visit: 24   : 3/2/1925 Primary Dx: Bilateral pleural effusion       Referred By: Referral From: Nurse    Spiritual Care Taxonomy:    Intended Effects: Build relationship of care and support    Methods: Assist with spiritual/Yarsanism practices;Demonstrate acceptance;Offer emotional support;Offer spiritual/Yarsanism support;Offer support    Interventions: Acknowledge current situation;Acknowledge response to difficult experience;Active listening;Ask guided questions about manuel;Explain  role;[place];Prayer for healing    There was a request for Advance Directives. Chp will follow up with this request.       Visit Type/Summary:     - Spiritual Care: Offered empathic listening and emotional support. Provided support for Patient's spiritual/Yarsanism requests. Offered prayer.    Spiritual Care support can be requested via an Epic consult. For urgent/immediate needs, please contact the On Call  at: Joaquin: ext [phone]

## 2024-02-15 NOTE — PROGRESS NOTES
[facility] Hospitalist Progress Note     PCP: Kymberly Jefferson MD    Chief Complaint: follow-up      SUBJECTIVE:  Reports no changes, currently undergoing echo    OBJECTIVE:  Temp:  [97.2 °F (36.2 °C)-98.6 °F (37 °C)] 98.3 °F (36.8 °C)  Pulse:  [67-95] 67  Resp:  [18-22] 20  BP: (105-180)/(68-93) 180/93  SpO2:  [88 %-99 %] 95 %    Intake/Output:    Intake/Output Summary (Last 24 hours) at 2/15/2024 0908  Last data filed at 2/15/2024 0608  Gross per 24 hour   Intake 200 ml   Output --   Net 200 ml       Last 3 Weights   02/14/24 1847 97 lb 1.6 oz (44 kg)   02/14/24 1236 100 lb (45.4 kg)   10/30/23 1024 101 lb 6.4 oz (46 kg)   03/31/23 0156 101 lb (45.8 kg)   03/30/23 2009 105 lb (47.6 kg)       Exam  Gen: No acute distress, alert and oriented  Pulm: diminished bl  CV: rrr +  Abd: Abdomen soft, nontender, nondistended,  bowel sounds present  MSK: Full range of motion in extremities,   Skin: no rashes or lesions  NEURO- no focal decificts    Data Review:       Labs:     Recent Labs   Lab 02/14/24  1507 02/15/24  0504   WBC 6.2 5.1   HGB 13.6 12.9   MCV 88.9 88.5   .0 224.0       Recent Labs   Lab 02/14/24  1508 02/15/24  0504    138   K 4.0 3.5    106   CO2 28.0 28.0   BUN 16 12   CREATSERUM 0.64 0.57   CA 9.0 9.1   GLU 91 80       No results for input(s): \"ALT\", \"AST\", \"ALB\", \"AMYLASE\", \"LIPASE\", \"LDH\" in the last 168 hours.    Invalid input(s): \"ALPHOS\", \"TBIL\", \"DBIL\", \"TPROT\"    No results for input(s): \"PGLU\" in the last 168 hours.    No results for input(s): \"TROP\" in the last 168 hours.      Meds:      amLODIPine  2.5 mg Oral Daily    furosemide  20 mg Intravenous BID (Diuretic)    cefTRIAXone  1 g Intravenous Q24H    azithromycin  500 mg Intravenous Q24H    enoxaparin  40 mg Subcutaneous Daily    PARoxetine  10 mg Oral QPM       hydrALAZINE, acetaminophen, polyethylene glycol (PEG 3350), sennosides, bisacodyl, fleet enema, ondansetron, metoclopramide      ASSESSMENT AND PLAN    89 year old  female with PMH sig for bresat cancer, SCC face, basal cell  ca, IBS, MDD/IVETTE, GERD, hearing loss here w dyspnea.     Dyspnea  Abn CXR bl infiltrates and pleural effusion  Breast cancer  MDD/IVETTE  GERD  Hearing loss     Dyspnea, cough  and abn CXR  -  pulm and cards cs, empric abx  - BNP ok - getting echo and started on diuresis by cards monitor ins/outs, daily weights  - tenative tap per pulm   - ddx includes cancer w hx d/w patient  - check PCT- not drawn yest will attempt to add on  - CT chest \"Bilateral multifocal peripheral predominant opacities \" + LAD ddx organizing pna, will d/w pulm     Presyncope/ lightheadedness  - sound orthostatic fm history- check orthostats hold on IVF w concern for acute chf     MDD, IVETTE, GERD  - home meds as able     DNR d/w patient. Encouraged involving her family in this discussion but she declines at this time.   Jocelyne De Anda MD  [facility] Hospitalist  [phone]  2/15/2024  9:08 AM

## 2024-02-15 NOTE — CONSULTS
[facility]    Karin May Patient Status:  Inpatient    3/2/1925 MRN [id number]   Location 514/514-A PCP Kymberly Jefferson MD     Date of Admission: 2024    History of Present Illness: Pt is a 89 year old female consulted for cough and dyspnea h/o bresat cancer, SCC face, basal cell ca, IBS, MDD/IVETTE, GERD . Noted cough and some lightheadedness x 3 weeks. Had taken home covid test that was equivocal and came to ED to get another test to confirm since her son was going to see her great grandson in [place]. Pt noted to have bilateral infiltrates and possible effusion and she was admitted. Cough is non producitve and has some dyspnea and headache with ligththeadedness. Otherwise pt denies f/c/s chest pain, nausea, vomitting or diarrhea, or sick contacts.       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      Current Outpatient Medications on File Prior to Encounter   Medication Sig Dispense Refill    nitrofurantoin macrocrystal 50 MG Oral Cap Take 1 capsule (50 mg total) by mouth nightly. 90 capsule 3    amLODIPine Besylate 2.5 MG Oral Tab TAKE 1 TABLET BY MOUTH EVERY DAY      Cholecalciferol (VITAMIN D) 2000 UNITS Oral Cap Take 1 capsule (2,000 Units total) by mouth daily.      PARoxetine 10 MG Oral Tab Take 1 tablet (10 mg total) by mouth every evening. take 1 tablet (10MG)  by ORAL route  every day      [] cefdinir 300 MG Oral Cap Take 1 capsule (300 mg total) by mouth every 12 (twelve) hours for 7 days. Start the day before the procedure 14 capsule 0    [] ciprofloxacin 500 MG Oral Tab Take 1 tablet (500 mg total) by mouth 2 (two) times daily for 5 days. 10 tablet 0    estradiol 0.1 MG/GM Vaginal Cream Apply 1 gm ( fingertip amount) to the vagina nightly for 1 week then every other night indefinitely. 42.5 g 3    lidocaine-menthol 4-1 % External Patch Place 1 patch onto the skin daily. 30 patch 0       Inpatient Medications:  Scheduled Medications:     amLODIPine  2.5 mg Oral Daily    furosemide  20 mg Intravenous BID (Diuretic)    cefTRIAXone  1 g Intravenous Q24H    azithromycin  500 mg Intravenous Q24H    enoxaparin  40 mg Subcutaneous Daily    PARoxetine  10 mg Oral QPM     Infusing Medications:    PRN Medications:  hydrALAZINE, acetaminophen, polyethylene glycol (PEG 3350), sennosides, bisacodyl, fleet enema, ondansetron, metoclopramide    Social History:    History   Smoking Status    Never   Smokeless Tobacco    Never       History   Alcohol Use No       History   Drug Use No     Work:No exposures.    TB: none  Asbestos: none      Family History   Problem Relation Age of Onset    Heart Disease Father         Per NG: cause of death    Breast Cancer Mother 69        Per NG: Cause of death    Breast Cancer Self 78    Breast Cancer Maternal Cousin Female 92      REVIEW OF SYSTEMS:   A  comprehensive 10 point review of systems was completed.  Pertinent positives and negatives noted in the the HPI.    OBJECTIVE:  Temp (24hrs), Av °F (36.7 °C), Min:97.2 °F (36.2 °C), Max:98.6 °F (37 °C)   /73 (BP Location: Right arm)   Pulse 66   Temp 97.7 °F (36.5 °C) (Oral)   Resp 18   Ht 152.4 cm (5')   Wt 97 lb 1.6 oz (44 kg)   SpO2 96%   BMI 18.96 kg/m²   O2: RA  General: NAD.   Neuro: Alert, no focal deficits.    HEENT: PERRL  Neck : No LAD  CV: RRR, nl S1, S2, no S4, S3 or murmur.   Lungs: Clear bilaterally.   Abd: Nontender, non distended.   Ext: No edema.   Skin: No rashes.     Labs:  Recent Labs   Lab 24  1507 02/15/24  0504   WBC 6.2 5.1   HGB 13.6 12.9   HCT 40.0 39.1   .0 224.0     Recent Labs   Lab 24  1508 02/15/24  0504   GLU 91 80   BUN 16 12   CREATSERUM 0.64 0.57   CA 9.0 9.1    138   K 4.0 3.5    106   CO2 28.0 28.0     No results for input(s): \"INR\", \"PTT\" in the last 168 hours.  No results for input(s): \"ABGPHT\", \"IAJFBB0P\", \"FMRFT3D\", \"ABGHCO3\", \"SITE\", \"DEV\", \"THGB\" in the last 168 hours.  COVID-19 Lab Results    COVID-19  Lab Results   Component Value Date    COVID19 Not Detected 2024    COVID19 Not Detected 2023       Pro-Calcitonin  Recent Labs   Lab 02/15/24  0504   PCT <0.04       Cardiac  No results for input(s): \"TROP\", \"PBNP\" in the last 168 hours.    Creatinine Kinase  No results for input(s): \"CK\" in the last 168 hours.    Inflammatory Markers  No results for input(s): \"CRP\", \"PAOLA\", \"LDH\", \"DDIMER\" in the last 168 hours.      Imaging:  CXR :  1. Extensive interstitial and alveolar airspace disease bilaterally involving upper lower lung fields right more than left suggesting bilateral pneumonia with moderate-sized bilateral pleural effusions right more than left.  Correlate clinically and   follow-up studies are advised.         CT Chest :    Bilateral multifocal peripheral predominant opacities, which can be seen in  setting of atypical/COVID pneumonia.  In the absence of prior imaging for comparison, findings can also be secondary to organizing pneumonia, which will be reassessed on   follow-up CT.      Peripheral predominant bilateral distortion with associated subpleural cystic spaces.  Findings are suggestive of a component of underlying pulmonary fibrosis.      Multiple prominent bilateral hilar and mediastinal lymph nodes, measuring up to 1.0 centimeters.  These could be reactive in etiology, with other etiologies not excluded at this time.  Recommend follow-up contrast enhanced CT chest in 3 to 6 months to   assess resolution.      Moderate to large hiatal hernia.      Small left and trace right pleural effusions.      Likely 1.0 centimeter peripherally calcified distal splenic artery aneurysm               Assessment/Plan   Abnormal CXR  Patchy infiltrates c/w pneumonia, infection most likely vs fluid overload.   There does not appear to be sufficient pleural effusion for thoracentesis on L. There is none on R and is likely basilar infiltrate.   I agree with diuresis trial.   Abx.   PCT initially negative, f/u in am.   Legionella and strep ag and check expanded RVP.   Prior CXR 2020 clear per report.   Cough   [place] to ?sinusitis.   Add flonase.   Abx  Suspect pneumonia/sinusitis  Rocephin/azithro 2/14. May extend to omnicef x 10-14 days for sinusitis coverage.   PCT initially negative, f/u in am.   Legionella and strep ag and check expanded RVP.   CV:   ?HFpEF  Per cardiology. Echo pending.       My best regards,         Celestino Castillo MD  [facility] Pulmonary, Critical Care and Sleep Medicine

## 2024-02-15 NOTE — OCCUPATIONAL THERAPY NOTE
OCCUPATIONAL THERAPY EVALUATION - INPATIENT     Room Number: 514/514-A  Evaluation Date: 2/15/2024  Type of Evaluation: Initial  Presenting Problem: BL Pleural Effusion    Physician Order: IP Consult to Occupational Therapy  Reason for Therapy: ADL/IADL Dysfunction and Discharge Planning    OCCUPATIONAL THERAPY ASSESSMENT   Patient is a 89 year old female admitted 2024 for BL pleural effusion.  Prior to admission, patient's baseline is Stephanie with ADLs/IADLS/ambulation.  Patient is currently functioning near baseline with  self care and functional mobility;  .  Patient is requiring SBA/Supervision as a result of the following impairments:  balance, activity tolerance . Occupational Therapy will continue to follow for duration of hospitalization.    Patient will benefit from continued skilled OT Services at discharge to promote functional independence and safety with additional support and return home with home health OT    PLAN  OT Treatment Plan: Balance activities;Energy conservation/work simplification techniques;ADL training;Functional transfer training;Endurance training;Patient/Family education;Patient/Family training;Compensatory technique education  OT Device Recommendations: None    OCCUPATIONAL THERAPY MEDICAL/SOCIAL HISTORY   Problem List   Principal Problem:    Bilateral pleural effusion  Active Problems:    Community acquired pneumonia, unspecified laterality    HOME SITUATION  Type of Home: Condo  Home Layout: One level  Lives With: Alone  Toilet and Equipment: Standard height toilet  Other Equipment: -- (Cane, RW)  Drives: Yes  Patient Regularly Uses: Reading glasses  Prior Level of [place]: Patient Mod I with ADLs, IADLs, and ambulatory with RW    SUBJECTIVE  You two are fun!    OCCUPATIONAL THERAPY EXAMINATION   OBJECTIVE  Precautions: Bed/chair alarm  Fall Risk: Standard fall risk    WEIGHT BEARING RESTRICTION     PAIN ASSESSMENT  [place]atin    ACTIVITY TOLERANCE  Pulse: 66        BP:  144/63  BP Location: Left arm  BP Method: Automatic  Patient Position: Sitting    O2 SATURATIONS  Oxygen Therapy  SPO2% on Room Air at Rest: 90  SPO2% on Oxygen at Rest: 96  At rest oxygen flow (liters per minute): 1.5    COGNITION  Overall Cognitive Status:  WFL - within functional limits    RANGE OF MOTION   Upper extremity ROM is within functional limits     STRENGTH ASSESSMENT  Upper extremity strength is within functional limits     ACTIVITIES OF DAILY LIVING ASSESSMENT  AM-PAC ‘6-Clicks’ Inpatient Daily Activity Short Form  How much help from another person does the patient currently need…  -   Putting on and taking off regular lower body clothing?: A Little  -   Bathing (including washing, rinsing, drying)?: A Little  -   Toileting, which includes using toilet, bedpan or urinal? : A Little  -   Putting on and taking off regular upper body clothing?: A Little  -   Taking care of personal grooming such as brushing teeth?: None  -   Eating meals?: None    AM-PAC Score:  Score: 20  Approx Degree of Impairment: 38.32%  Standardized Score (AM-PAC Scale): 42.03  CMS Modifier (G-Code): CJ    FUNCTIONAL TRANSFER ASSESSMENT  Sit to Stand: Edge of Bed  Edge of Bed: Stand-by Assist  Toilet Transfer: Stand-by Assist    BED MOBILITY  Rolling: Supervision  Supine to Sit : Stand-by Assist  Sit to Supine (OT): Stand-by Assist  Scooting: SBA    BALANCE ASSESSMENT  Static Sitting: Stand-by Assist  Static Standing: Stand-by Assist    FUNCTIONAL ADL ASSESSMENT  Eating: Supervision  Grooming Seated: Supervision  Bathing Seated: Stand-by Assist  UB Dressing Seated: Stand-by Assist  LB Dressing Seated: Stand-by Assist  Toileting Seated: Stand-by Assist    TH   Skilled Therapy Provided: ADL retraining, functional t/f and functional mobility engagement, activity promotion; engaging in morning ADLs with SBA/SPV; ambulatory >community distances; on 1.5 liters at rest and saturating at 97%; trialed on RA as pt does not use O2 at home;  saturating at 89% after engagement in activities; pt has good support and daughter lives close by; anticipate pt will progress to level where she is safe to dc home with increased support; Continue skilled occupational therapy while IP to maximize patient function and increase patient participation, safety, and independence with basic ADL and everyday activities.       EDUCATION PROVIDED  Patient: Role of Occupational Therapy; Plan of Care; Discharge Recommendations; Functional Transfer Techniques; Posture/Positioning; Compensatory ADL Techniques  Patient's Response to Education: Verbalized Understanding; Returned Demonstration    The patient's Approx Degree of Impairment: 38.32% has been calculated based on documentation in the [facility] '6 clicks' Inpatient Daily Activity Short Form.  Research supports that patients with this level of impairment may benefit from home with intermittent SPV.  Final disposition will be made by interdisciplinary medical team.    Patient End of Session: With  staff;Up in chair;Needs met;Call light within reach;RN aware of session/findings;Alarm set    OT Goals  Patient self-stated goal is: to go home     Patient will complete LE dressing with Mod I   Comment:     Patient will complete toilet transfer with Mod I   Comment:     Patient will complete self care task at sink level with Mod I    Comment:     Comment:         Goals  on:   Frequency:3-5x week     Patient Evaluation Complexity Level:   Occupational Profile/Medical History MODERATE - Expanded review of history including review of medical or therapy record   Specific performance deficits impacting engagement in ADL/IADL LOW  1 - 3 performance deficits    Client Assessment/Performance Deficits LOW - No comorbidities nor modifications of tasks    Clinical Decision Making LOW - Analysis of occupational profile, problem-focused assessments, limited treatment options    Overall Complexity LOW     OT Session Time: 23  minutes  Self-Care Home Management: 15 minutes  Therapeutic Activity: 8 minutes      Jus Harrington, Occupational Therapist, OTR/L ext [phone]

## 2024-02-15 NOTE — PROGRESS NOTES
[facility] CARDIOLOGY Progress Note      Karin May is a 89 year old female who I assessed as follows:  Chief Complaint   Patient presents with    Covid-19 Test    Shortness Of Breath          REASON FOR CONSULTATION:    assess for CHF            ASSESSMENT/PLAN:     IMPRESSIONS:  Dyspnea with abnormal CXR and pleural effusions. Looks like CHF though her BNP is normal and she is not obese. CT chest abnormal but no clearcut etiology.  HTN, labile pressures  Varicose veins, s/p stripping  [place], unable to charge her hearing aids        PLAN:  1. Will treat for now as if acute on chronic CHF, presumed diastolic. If no improvement with diuretics, then consider thoracentesis  2. Echo   3. Follow BP  4. Tele  5. Follow renal function            SUBJECTIVE:    Breathing a little better. Edema better.    Unable to charge her hearing aids. Conversed with writing.         --------------------------------------------------------------------------------------------------------------------------------    HPI:         Presents with dyspnea and nonproductive cough at least a few weeks, getting worse past couple weeks. Notes LE edema but she will not commit to whether worse or not recently. No PND, orthopnea, chest pain.     Does have history of varicose veins s/p stripping.             No current outpatient medications on file.      Past Medical History:   Diagnosis Date    Acid reflux     Anxiety and depression 1/7/2018    Arthritis     Basal cell carcinoma (BCC) of skin of face 1/10/2021    Forehead, right inferior central (biopsy by shave method) + pigmented seborrheic keratosis left inferior central      Bilateral breast cancer (HCC) 12/20/2013    Breast cancer (HCC) 2003    Per NG:Lumpectomy    Essential hypertension     Irritable bowel syndrome     Recurrent urinary tract infection 1/7/2019    S/P ear surgery     Per NG: Ear surgery X2    Screening for breast cancer 1/7/2019    Squamous cell carcinoma in situ  (SCCIS) of skin of face 2021    Urinary tract infection 10/29/2013    Varicose veins     Per NG:Stripping and ligation     Past Surgical History:   Procedure Laterality Date    CHOLECYSTECTOMY      Cholecystitis    INNER EAR SURGERY PROC UNLISTED      LUMPECTOMY LEFT      LUMPECTOMY RIGHT      RADIATION RIGHT      2003     Family History   Problem Relation Age of Onset    Heart Disease Father         Per NG: cause of death    Breast Cancer Mother 69        Per NG: Cause of death    Breast Cancer Self 78    Breast Cancer Maternal Cousin Female 92      Social History:  Social History     Socioeconomic History    Marital status:    Tobacco Use    Smoking status: Never    Smokeless tobacco: Never   Substance and Sexual Activity    Alcohol use: No     Alcohol/week: 0.0 standard drinks of alcohol    Drug use: No   Other Topics Concern    Caffeine Concern Yes     Comment: Coffee, 1 cup daily    Exercise No     Social Determinants of Health     Food Insecurity: No Food Insecurity (2024)    Food Insecurity     Food Insecurity: Never true   Transportation Needs: No Transportation Needs (2024)    Transportation Needs     Lack of Transportation: No   Housing Stability: Low Risk  (2024)    Housing Stability     Housing Instability: No          Medications:            Allergies  Allergies   Allergen Reactions    Sulfa Antibiotics DIARRHEA                 EXAM:         TELE: SR, PACs          BP (!) 180/93 (BP Location: Right arm)   Pulse 67   Temp 98.3 °F (36.8 °C) (Oral)   Resp 20   Ht 5' (1.524 m)   Wt 97 lb 1.6 oz (44 kg)   SpO2 95%   BMI 18.96 kg/m²   Temp (24hrs), Av °F (36.7 °C), Min:97.2 °F (36.2 °C), Max:98.6 °F (37 °C)    Wt Readings from Last 3 Encounters:   24 97 lb 1.6 oz (44 kg)   10/30/23 101 lb 6.4 oz (46 kg)   23 101 lb (45.8 kg)         Intake/Output Summary (Last 24 hours) at 2/15/2024 0805  Last data filed at 2/15/2024 0608  Gross per 24 hour   Intake 200 ml    Output --   Net 200 ml         GENERAL: well developed, well nourished, in no apparent distress  SKIN: no rashes  HEENT: atraumatic, normocephalic, throat without erythema, +[place]  NECK: supple, no bruits  LUNGS: decreased breath sounds in bases  CARDIO: regular with frequent premature beats without murmur or S3   GI: soft, nontender  EXTREMITIES: trace-1+ edema  NEUROLOGY: alert  PSYCH: cooperative          LABORATORY DATA:               ECG: SR, LAE, PAC        ECHO:          Labs:  Recent Labs   Lab 02/14/24  1508 02/15/24  0504   GLU 91 80   BUN 16 12   CREATSERUM 0.64 0.57   CA 9.0 9.1    138   K 4.0 3.5    106   CO2 28.0 28.0     No results for input(s): \"TROP\" in the last 168 hours.  Recent Labs   Lab 02/15/24  0504   RBC 4.42   HGB 12.9   HCT 39.1   MCV 88.5   MCH 29.2   MCHC 33.0   RDW 14.4   NEPRELIM 2.74   WBC 5.1   .0     No results for input(s): \"TROP\", \"TROPHS\", \"CK\" in the last 168 hours.    Imaging:  CT CHEST (CPT=71250)    Result Date: 2/14/2024  CONCLUSION:  Bilateral multifocal peripheral predominant opacities, which can be seen in setting of atypical/COVID pneumonia.  In the absence of prior imaging for comparison, findings can also be secondary to organizing pneumonia, which will be reassessed on follow-up CT.  Peripheral predominant bilateral distortion with associated subpleural cystic spaces.  Findings are suggestive of a component of underlying pulmonary fibrosis.  Multiple prominent bilateral hilar and mediastinal lymph nodes, measuring up to 1.0 centimeters.  These could be reactive in etiology, with other etiologies not excluded at this time.  Recommend follow-up contrast enhanced CT chest in 3 to 6 months to assess resolution.  Moderate to large hiatal hernia.  Small left and trace right pleural effusions.  Likely 1.0 centimeter peripherally calcified distal splenic artery aneurysm     Dictated by (CST): Lamar Dominguez MD on 2/14/2024 at 8:25 PM     Finalized by  (CST): Lamar Dominguez MD on 2/14/2024 at 8:34 PM          XR CHEST AP PORTABLE  (CPT=71045)    Result Date: 2/14/2024  CONCLUSION:  1. Extensive interstitial and alveolar airspace disease bilaterally involving upper lower lung fields right more than left suggesting bilateral pneumonia with moderate-sized bilateral pleural effusions right more than left.  Correlate clinically and follow-up studies are advised.    Dictated by (CST): John Burns MD on 2/14/2024 at 2:57 PM     Finalized by (CST): John Burns MD on 2/14/2024 at 2:58 PM                Charly Christopher MD

## 2024-02-15 NOTE — CM/SW NOTE
DOV received MDO for POLST. DOV placed POLST form on chart. MD to discuss w/ pt/family, fill out middle section of POLST form, and sign prior to SW/CTL/RN witnessing POLST form.      Per EMR review, pt DNAR/full tx at this time.  Declined select.    Pt from home alone, respiratory panel pending at this time.  Pt will DC home no skilled needs from .    PLAN: DC home no skilled needs    / to remain available for support and/or discharge planning.     Lakeshia Cameron MSW, LSW [id number]

## 2024-02-15 NOTE — CONSULTS
Spiritual Care Visit Note    Patient Name: Karin May Date of Spiritual Care Visit: 02/15/24   : 3/2/1925 Primary Dx: Bilateral pleural effusion       Referred By: Referral From: Nurse    Spiritual Care Taxonomy:    Intended Effects: Aligning care plan with patient's values    Methods: Collaborate with care team member;Offer support    Interventions: Active listening;Ask guided questions;Assist someone with Advance Directives    Visit Type/Summary:     - PoA: Patient unable to complete PoAH at this time: Writer report a PoAH in Epic. Patient wants to her daughter arrives to have PoAH discussion. RN will contact writer. No other need at this time.    Spiritual Care support can be requested via an [facility] consult. For urgent/immediate needs, please contact the On Call  at: [place]: ext [phone]    HERVE rKause CAMII   [id number]

## 2024-02-15 NOTE — PHYSICAL THERAPY NOTE
PHYSICAL THERAPY EVALUATION - INPATIENT     Room Number: 514/514-A  Evaluation Date: 2/15/2024  Type of Evaluation: Initial   Physician Order: PT Eval and Treat    Presenting Problem: bilateral pleural effusion     Reason for Therapy: Mobility Dysfunction and Discharge Planning    PHYSICAL THERAPY ASSESSMENT   Patient is a 89 year old female admitted 2024 for bilateral pleural effusion.  Prior to admission, patient's baseline is independent in ADL's and ambulation with cane.  Patient is currently functioning near baseline with bed mobility, transfers, and gait.  Patient is requiring supervision as a result of the following impairments: medical status.  Physical Therapy will continue to follow for duration of hospitalization. Patient hard of hearing.     Patient will benefit from continued skilled PT Services at discharge to promote functional independence and safety with additional support and return home with home health PT.    PLAN  PT Treatment Plan: Body mechanics;Bed mobility;Patient education;Gait training;Transfer training;Balance training  Rehab Potential : Good  Frequency (Obs): 3x/week    PHYSICAL THERAPY MEDICAL/SOCIAL HISTORY   Problem List  Principal Problem:    Bilateral pleural effusion  Active Problems:    Community acquired pneumonia, unspecified laterality    HOME SITUATION  Home Situation  Type of Home: Condo  Home Layout: One level  Stairs to Enter : 0  Railing: No  Stairs to Bedroom: 0  Railing: No  Lives With: Alone (daughter lives next door)  Drives: Yes  Patient Owned Equipment: Cane;Rolling walker  Patient Regularly Uses: Reading glasses     SUBJECTIVE  \"What is power of \"    PHYSICAL THERAPY EXAMINATION   OBJECTIVE  Precautions: None  Fall Risk: Standard fall risk    WEIGHT BEARING RESTRICTION  Weight Bearing Restriction: None                PAIN ASSESSMENT  Ratin          COGNITION  Overall Cognitive Status:  WFL - within functional limits    RANGE OF MOTION AND STRENGTH  ASSESSMENT  Lower extremity ROM is within functional limits  BLE WNL  Lower extremity strength is within functional limits  BLE WNL    BALANCE  Static Sitting: Good  Dynamic Sitting: Fair +  Static Standing: Fair  Dynamic Standing: Fair -    AM-PAC '6-Clicks' INPATIENT SHORT FORM - BASIC MOBILITY  How much difficulty does the patient currently have...  Patient Difficulty: Turning over in bed (including adjusting bedclothes, sheets and blankets)?: None   Patient Difficulty: Sitting down on and standing up from a chair with arms (e.g., wheelchair, bedside commode, etc.): None   Patient Difficulty: Moving from lying on back to sitting on the side of the bed?: None   How much help from another person does the patient currently need...   Help from Another: Moving to and from a bed to a chair (including a wheelchair)?: A Little   Help from Another: Need to walk in hospital room?: A Little   Help from Another: Climbing 3-5 steps with a railing?: A Little     AM-PAC Score:  Raw Score: 21   Approx Degree of Impairment: 28.97%   Standardized Score (AM-PAC Scale): 50.25   CMS Modifier (G-Code):     FUNCTIONAL ABILITY STATUS  Functional Mobility/Gait Assessment  Gait Assistance: Supervision  Distance (ft): 80ft  Assistive Device: Cane  Rolling: independent  Supine to Sit: independent  Sit to Supine:  not tested   Sit to Stand: supervision    Exercise/Education Provided:  Bed mobility  Body mechanics  Gait training  Transfer training    The patient's Approx Degree of Impairment: 28.97% has been calculated based on documentation in the [facility] '6 clicks' Inpatient Basic Mobility Short Form.  Research supports that patients with this level of impairment may benefit from home. Final disposition will be made by interdisciplinary medical team. Patient on room air. Patient on 1.5 liters of oxygen during the session. Patient oxygen sat 89-90% and heart rate 87, blood pressure 132/74. Patient's daughter lives across the street and can  check in per patient. Patient chose to walk with her cane, demonstrates good insight. At end of session, blood pressure 144/63.     Patient End of Session: Up in chair;Needs met;Call light within reach;RN aware of session/findings;All patient questions and concerns addressed;Alarm set    CURRENT GOALS  Goals to be met by: 2/25/24  Patient Goal Patient's self-stated goal is: to go home   Goal #1 Patient is able to demonstrate supine - sit EOB @ level: independent     Goal #1   Current Status    Goal #2 Patient is able to demonstrate transfers Sit to/from Stand at assistance level: modified independent with cane - straight     Goal #2  Current Status    Goal #3 Patient is able to ambulate 100 feet with assist device: cane - straight at assistance level: modified independent   Goal #3   Current Status    Goal #4    Goal #4   Current Status    Goal #5 Patient to demonstrate independence with home activity/exercise instructions provided to patient in preparation for discharge.   Goal #5   Current Status    Goal #6    Goal #6  Current Status      Patient Evaluation Complexity Level:  History Low - no personal factors and/or co-morbidities   Examination of body systems Low -  addressing 1-2 elements   Clinical Presentation Low- Stable   Clinical Decision Making  Low Complexity     Gait Training: 10 minutes  Therapeutic Activity:  13 minutes

## 2024-02-15 NOTE — PLAN OF CARE
Monitoring vitals. Bed is in lowest setting, locked and has call light within reach.  Patient is able to make needs known. Frequent rounding provided by the nursing staff. Denied any pain overnight.     Problem: Patient Centered Care  Goal: Patient preferences are identified and integrated in the patient's plan of care  Description: Interventions:  - What would you like us to know as we care for you? I am from home alone   - Provide timely, complete, and accurate information to patient/family  - Incorporate patient and family knowledge, values, beliefs, and cultural backgrounds into the planning and delivery of care  - Encourage patient/family to participate in care and decision-making at the level they choose  - Honor patient and family perspectives and choices  Outcome: Progressing     Problem: Patient/Family Goals  Goal: Patient/Family Long Term Goal  Description: Patient's Long Term Goal: To be discharged    Interventions:  - Attain MD approval  -Maintain stable vitals  -Maintain stable labs  - See additional Care Plan goals for specific interventions  Outcome: Progressing  Goal: Patient/Family Short Term Goal  Description: Patient's Short Term Goal: Return to baseline respiratory status    Interventions:   - Monitor oxygenation status   -Assess lungs/circulation  -Administer cough medications as needed  - See additional Care Plan goals for specific interventions  Outcome: Progressing     Problem: RESPIRATORY - ADULT  Goal: Achieves optimal ventilation and oxygenation  Description: INTERVENTIONS:  - Assess for changes in respiratory status  - Assess for changes in mentation and behavior  - Position to facilitate oxygenation and minimize respiratory effort  - Oxygen supplementation based on oxygen saturation or ABGs  - Provide Smoking Cessation handout, if applicable  - Encourage broncho-pulmonary hygiene including cough, deep breathe, Incentive Spirometry  - Assess the need for suctioning and perform as  needed  - Assess and instruct to report SOB or any respiratory difficulty  - Respiratory Therapy support as indicated  - Manage/alleviate anxiety  - Monitor for signs/symptoms of CO2 retention  Outcome: Progressing     Problem: CARDIOVASCULAR - ADULT  Goal: Maintains optimal cardiac output and hemodynamic stability  Description: INTERVENTIONS:  - Monitor vital signs, rhythm, and trends  - Monitor for bleeding, hypotension and signs of decreased cardiac output  - Evaluate effectiveness of vasoactive medications to optimize hemodynamic stability  - Monitor arterial and/or venous puncture sites for bleeding and/or hematoma  - Assess quality of pulses, skin color and temperature  - Assess for signs of decreased coronary artery perfusion - ex. Angina  - Evaluate fluid balance, assess for edema, trend weights  Outcome: Progressing  Goal: Absence of cardiac arrhythmias or at baseline  Description: INTERVENTIONS:  - Continuous cardiac monitoring, monitor vital signs, obtain 12 lead EKG if indicated  - Evaluate effectiveness of antiarrhythmic and heart rate control medications as ordered  - Initiate emergency measures for life threatening arrhythmias  - Monitor electrolytes and administer replacement therapy as ordered  Outcome: Progressing

## 2024-02-15 NOTE — H&P
HOSPITALIST HISTORY AND PHYSICAL     CC:   Chief Complaint   Patient presents with    Covid-19 Test    Shortness Of Breath        PCP: MD Giovany Chandra    History of Present Illness:   Patient is a 89 year old female with PMH sig for bresat cancer, SCC face, basal cell  ca, IBS, MDD/IVETTE, GERD, hearing loss here w dyspnea and cough. Sx onset several months ago- denies weight gain (actually + weight loss) denies orthopnea/ PND or CP does have some bipedal swelling. No heart history denies fevers- she actually only came in today bc she wanted to check a covid test before seeing her son (who is about to fly to meet a new grandson in [place]) and her home test was inconclusive.     Pt also reports episode of occasional lightheadedness no spinning occurs when going fm seated to standing. No loc or associated sx.       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    Urinary tract infection 10/29/2013    Varicose veins     Per NG:Stripping and ligation      Past Surgical History:   Procedure Laterality Date    CHOLECYSTECTOMY      Cholecystitis    INNER EAR SURGERY PROC UNLISTED      LUMPECTOMY LEFT      LUMPECTOMY RIGHT      RADIATION RIGHT      2003        ALL:  Allergies   Allergen Reactions    Sulfa Antibiotics DIARRHEA        azithromycin, 500 mg, Once        Social History     Tobacco Use    Smoking status: Never    Smokeless tobacco:  Never   Substance Use Topics    Alcohol use: No     Alcohol/week: 0.0 standard drinks of alcohol        Fam Hx  Family History   Problem Relation Age of Onset    Heart Disease Father         Per NG: cause of death    Breast Cancer Mother 69        Per NG: Cause of death    Breast Cancer Self 78    Breast Cancer Maternal Cousin Female 92       Review of Systems  10 point Comprehensive ROS reviewed and negative except for what's stated above.     OBJECTIVE:  BP (!) 179/88   Pulse 75   Temp 97.2 °F (36.2 °C) (Tympanic)   Resp 22   Ht 5' (1.524 m)   Wt 100 lb (45.4 kg)   SpO2 99%   BMI 19.53 kg/m²     GEN: NAD, AAO  NECK: supple, no LAD  HEENT- sclera anti-icteric, OP- MMM  CV: rrr, +s1/s2, PMI non displaced  LUNGS: bl diminished   ABL soft, NT/ND, NABS, no HSC  EXT: no LE edema b/l , DP pulses 2+ b/l  Neuro: sensation intact, no focal deficits  SKIN- no rashes, no lesion  PSYCH- normal mentation, normal affect      LABS:   Lab Results   Component Value Date    WBC 6.2 02/14/2024    HGB 13.6 02/14/2024    HCT 40.0 02/14/2024    .0 02/14/2024    CREATSERUM 0.64 02/14/2024    BUN 16 02/14/2024     02/14/2024    K 4.0 02/14/2024     02/14/2024    CO2 28.0 02/14/2024    GLU 91 02/14/2024    CA 9.0 02/14/2024     EKG personally reviewed NSR w PACs    Radiology: XR CHEST AP PORTABLE  (CPT=71045)    Result Date: 2/14/2024  PROCEDURE: XR CHEST AP PORTABLE  (CPT=71045) TIME: 1440 hours.   COMPARISON: [facility], XR CHEST PA + LAT CHEST (CPT=71046), 2/26/2020, 11:55 AM.  INDICATIONS: Shortness of breath and scratchy throat x2 months.  TECHNIQUE:   Single view.   FINDINGS:  CARDIAC/VASC: Normal.  No cardiac silhouette abnormality or cardiomegaly.  Unremarkable pulmonary vasculature.  MEDIAST/KENZIE:   No visible mass or adenopathy. LUNGS/PLEURA: Extensive interstitial and alveolar airspace disease bilaterally involving upper and lower lung fields right more than left suggesting bilateral  pneumonia with moderate-sized bilateral pleural effusions right more than left.  Correlate clinically and follow-up studies are advised. BONES: No fracture or visible bony lesion.  Probable subcentimeter calcified loose body about the proximal right humerus. OTHER: Clips in the left axilla.         CONCLUSION:  1. Extensive interstitial and alveolar airspace disease bilaterally involving upper lower lung fields right more than left suggesting bilateral pneumonia with moderate-sized bilateral pleural effusions right more than left.  Correlate clinically and follow-up studies are advised.    Dictated by (CST): John Burns MD on 2/14/2024 at 2:57 PM     Finalized by (CST): John Burns MD on 2/14/2024 at 2:58 PM               ASSESSMENT / PLAN:  89 year old female with PMH sig for bresat cancer, SCC face, basal cell  ca, IBS, MDD/IVETTE, GERD, hearing loss here w dyspnea.    Dyspnea  Abn CXR bl infiltrates and pleural effusion  Breast cancer  MDD/IVETTE  GERD  Hearing loss    Dyspnea, cough  and abn CXR  - dw Dr Delgadillo- pulm and cards cs, empriic abx  - BNP ok - getting echo and started on diuresis by cards monitor ins/outs, daily weights  - tenative tap per pulm   - ddx includes cancer w hx d/w patient  - check PCT  - CT chest ordered as well    Presyncope/ lightheadedness  - sound orthostatic fm history- check orthostats hold on IVF w concern for acute chf    MDD, IVETTE, GERD  - home meds as able    DNR d/w patient. Encouraged involving her family in this discussion but she declines at this time.     Outpatient records or previous hospital records reviewed as detailed above.    ISABELLA hospitalist to continue to follow patient while in house      ISABELLA Bernal Hospitalist  Pager [phone]    2/14/2024  6:00 PM

## 2024-02-15 NOTE — CONSULTS
[facility] CARDIOLOGY CONSULT      Karin May is a 89 year old female who I assessed as follows:  Chief Complaint   Patient presents with    Covid-19 Test    Shortness Of Breath          REASON FOR CONSULTATION:    assess for CHF            ASSESSMENT/PLAN:     IMPRESSIONS:  Dyspnea with abnormal CXR and pleural effusions. Looks like CHF though her BNP is normal and she is not obese.   HTN  Varicose veins, s/p stripping        PLAN:  1. Will treat for now as if acute on chronic CHF, presumed diastolic. If no improvement with diuretics, then consider thoracentesis  2. Echo   3. Follow BP  4. Tele  5. Follow renal function              --------------------------------------------------------------------------------------------------------------------------------    HPI:         Presents with dyspnea and nonproductive cough at least a few weeks, getting worse past couple weeks. Notes LE edema but she will not commit to whether worse or not recently. No PND, orthopnea, chest pain.     Does have history of varicose veins s/p stripping.             Current Outpatient Medications   Medication Sig Dispense Refill    estradiol 0.1 MG/GM Vaginal Cream Apply 1 gm ( fingertip amount) to the vagina nightly for 1 week then every other night indefinitely. 42.5 g 3    nitrofurantoin macrocrystal 50 MG Oral Cap Take 1 capsule (50 mg total) by mouth nightly. 90 capsule 3    lidocaine-menthol 4-1 % External Patch Place 1 patch onto the skin daily. 30 patch 0    amLODIPine Besylate 2.5 MG Oral Tab TAKE 1 TABLET BY MOUTH EVERY DAY      Cholecalciferol (VITAMIN D) 2000 UNITS Oral Cap Take 1 capsule (2,000 Units total) by mouth daily.      PARoxetine 10 MG Oral Tab Take 1 tablet (10 mg total) by mouth every evening. take 1 tablet (10MG)  by ORAL route  every day        Past Medical History:   Diagnosis Date    Acid reflux     Anxiety and depression 1/7/2018    Arthritis     Basal cell carcinoma (BCC) of skin of face  1/10/2021    Forehead, right inferior central (biopsy by shave method) + pigmented seborrheic keratosis left inferior central      Bilateral breast cancer (HCC) 12/20/2013    Breast cancer (HCC) 2003    Per NG:Lumpectomy    Essential hypertension     Irritable bowel syndrome     Recurrent urinary tract infection 1/7/2019    S/P ear surgery     Per NG: Ear surgery X2    Screening for breast cancer 1/7/2019    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    Urinary tract infection 10/29/2013    Varicose veins     Per NG:Stripping and ligation     Past Surgical History:   Procedure Laterality Date    CHOLECYSTECTOMY      Cholecystitis    INNER EAR SURGERY PROC UNLISTED      LUMPECTOMY LEFT      LUMPECTOMY RIGHT      RADIATION RIGHT      2003     Family History   Problem Relation Age of Onset    Heart Disease Father         Per NG: cause of death    Breast Cancer Mother 69        Per NG: Cause of death    Breast Cancer Self 78    Breast Cancer Maternal Cousin Female 92      Social History:  Social History     Socioeconomic History    Marital status:    Tobacco Use    Smoking status: Never    Smokeless tobacco: Never   Substance and Sexual Activity    Alcohol use: No     Alcohol/week: 0.0 standard drinks of alcohol    Drug use: No   Other Topics Concern    Caffeine Concern Yes     Comment: Coffee, 1 cup daily    Exercise No          Medications:  Current Facility-Administered Medications   Medication Dose Route Frequency    azithromycin (Zithromax) 500 mg in sodium chloride 0.9% 250mL IVPB premix  500 mg Intravenous Once           Allergies  Allergies   Allergen Reactions    Sulfa Antibiotics DIARRHEA               REVIEW OF SYSTEMS:   GENERAL HEALTH: no fevers  SKIN: denies any unusual skin lesions or rashes   EARS: hard of hearing  EYE: no recent vision changes  THROAT: denies sore throat  RESPIRATORY: cough, shortness of breath  CARDIOVASCULAR: denies chest pain, syncope, or  palpitations  GI: denies abdominal pain, nausea and vomiting  NEURO: denies headaches and focal neurologic symptoms  PSYCH: no recent depression  ENDOCRINE: no change in sleep pattern  HEME: no easy bruising  All other systems reviewed and negative.          EXAM:         TELE: SR, PACs          /81   Pulse 80   Temp 97.2 °F (36.2 °C) (Tympanic)   Resp 22   Ht 5' (1.524 m)   Wt 100 lb (45.4 kg)   SpO2 95%   BMI 19.53 kg/m²   Temp (24hrs), Av.2 °F (36.2 °C), Min:97.2 °F (36.2 °C), Max:97.2 °F (36.2 °C)    Wt Readings from Last 3 Encounters:   24 100 lb (45.4 kg)   10/30/23 101 lb 6.4 oz (46 kg)   23 101 lb (45.8 kg)         Intake/Output Summary (Last 24 hours) at 2024 1811  Last data filed at 2024 1800  Gross per 24 hour   Intake 100 ml   Output --   Net 100 ml         GENERAL: well developed, well nourished, in no apparent distress  SKIN: no rashes  HEENT: atraumatic, normocephalic, throat without erythema  NECK: supple, no bruits  LUNGS: decreased breath sounds in bases  CARDIO: regular with frequent premature beats without murmur or S3   GI: soft, nontender  EXTREMITIES: 1-2+ edema  NEUROLOGY: alert  PSYCH: cooperative          LABORATORY DATA:               ECG: SR, LAE, PAC        ECHO:          Labs:  Recent Labs   Lab 24  1508   GLU 91   BUN 16   CREATSERUM 0.64   CA 9.0      K 4.0      CO2 28.0     No results for input(s): \"TROP\" in the last 168 hours.  Recent Labs   Lab 24  1507   RBC 4.50   HGB 13.6   HCT 40.0   MCV 88.9   MCH 30.2   MCHC 34.0   RDW 14.6   NEPRELIM 4.50   WBC 6.2   .0     No results for input(s): \"TROP\", \"TROPHS\", \"CK\" in the last 168 hours.    Imaging:  XR CHEST AP PORTABLE  (CPT=71045)    Result Date: 2024  CONCLUSION:  1. Extensive interstitial and alveolar airspace disease bilaterally involving upper lower lung fields right more than left suggesting bilateral pneumonia with moderate-sized bilateral pleural  effusions right more than left.  Correlate clinically and follow-up studies are advised.    Dictated by (CST): John Burns MD on 2/14/2024 at 2:57 PM     Finalized by (CST): John Burns MD on 2/14/2024 at 2:58 PM                Charly Christopher MD

## 2024-02-15 NOTE — SPIRITUAL CARE NOTE
Spiritual Care Visit Note    Patient Name: Karin May Date of Spiritual Care Visit: 02/15/24   : 3/2/1925 Primary Dx: Bilateral pleural effusion       Referred By: Referral From: Patient    Spiritual Care Taxonomy:    Intended Effects: Aligning care plan with patient's values    Methods: Collaborate with care team member;Offer support    Interventions: Active listening;Ask guided questions;Assist someone with Advance Directives;Silent prayer    Visit Type/Summary:     - PoA: Identified Existing PoAH - No Updates Needed: Visited patient in response to POA for Healthcare consult/request. Prior to visit, reviewed the patient's EHR and paper chart to identify any existing PoAH documentation. A previously completed PoAH document was located in Epic. The document was reviewed with the patient during the visit. Patient states that the current document is accurate. No changes or updates are required. Confirmed that the Po primary agent name and contact information, daughter, Anupama Tang ([phone]) have been entered into the Epic, Advance Directives section. No other need at this time.    Spiritual Care support can be requested via an [facility] consult. For urgent/immediate needs, please contact the On Call  at: [place]: ext [phone]    HERVE Kunz   [id number]

## 2024-02-16 VITALS
WEIGHT: 92.19 LBS | HEART RATE: 92 BPM | TEMPERATURE: 98 F | OXYGEN SATURATION: 94 % | SYSTOLIC BLOOD PRESSURE: 124 MMHG | BODY MASS INDEX: 18.1 KG/M2 | HEIGHT: 60 IN | DIASTOLIC BLOOD PRESSURE: 78 MMHG | RESPIRATION RATE: 19 BRPM

## 2024-02-16 LAB
ANION GAP SERPL CALC-SCNC: 3 MMOL/L (ref 0–18)
BASOPHILS # BLD AUTO: 0.04 X10(3) UL (ref 0–0.2)
BASOPHILS NFR BLD AUTO: 0.8 %
BUN BLD-MCNC: 13 MG/DL (ref 9–23)
BUN/CREAT SERPL: 22 (ref 10–20)
C DIFF TOX B STL QL: NEGATIVE
CALCIUM BLD-MCNC: 9 MG/DL (ref 8.7–10.4)
CHLORIDE SERPL-SCNC: 107 MMOL/L (ref 98–112)
CO2 SERPL-SCNC: 28 MMOL/L (ref 21–32)
CREAT BLD-MCNC: 0.59 MG/DL
DEPRECATED RDW RBC AUTO: 46.3 FL (ref 35.1–46.3)
EGFRCR SERPLBLD CKD-EPI 2021: 81 ML/MIN/1.73M2 (ref 60–?)
EOSINOPHIL # BLD AUTO: 0.34 X10(3) UL (ref 0–0.7)
EOSINOPHIL NFR BLD AUTO: 6.4 %
ERYTHROCYTE [DISTWIDTH] IN BLOOD BY AUTOMATED COUNT: 14.3 % (ref 11–15)
GLUCOSE BLD-MCNC: 86 MG/DL (ref 70–99)
HCT VFR BLD AUTO: 40.2 %
HGB BLD-MCNC: 13.5 G/DL
IMM GRANULOCYTES # BLD AUTO: 0.01 X10(3) UL (ref 0–1)
IMM GRANULOCYTES NFR BLD: 0.2 %
LYMPHOCYTES # BLD AUTO: 1.89 X10(3) UL (ref 1–4)
LYMPHOCYTES NFR BLD AUTO: 35.5 %
MCH RBC QN AUTO: 29.8 PG (ref 26–34)
MCHC RBC AUTO-ENTMCNC: 33.6 G/DL (ref 31–37)
MCV RBC AUTO: 88.7 FL
MONOCYTES # BLD AUTO: 0.56 X10(3) UL (ref 0.1–1)
MONOCYTES NFR BLD AUTO: 10.5 %
NEUTROPHILS # BLD AUTO: 2.49 X10 (3) UL (ref 1.5–7.7)
NEUTROPHILS # BLD AUTO: 2.49 X10(3) UL (ref 1.5–7.7)
NEUTROPHILS NFR BLD AUTO: 46.6 %
OSMOLALITY SERPL CALC.SUM OF ELEC: 285 MOSM/KG (ref 275–295)
PLATELET # BLD AUTO: 246 10(3)UL (ref 150–450)
POTASSIUM SERPL-SCNC: 4 MMOL/L (ref 3.5–5.1)
PROCALCITONIN SERPL-MCNC: <0.04 NG/ML (ref ?–0.05)
RBC # BLD AUTO: 4.53 X10(6)UL
SODIUM SERPL-SCNC: 138 MMOL/L (ref 136–145)
WBC # BLD AUTO: 5.3 X10(3) UL (ref 4–11)

## 2024-02-16 PROCEDURE — 84145 PROCALCITONIN (PCT): CPT | Performed by: INTERNAL MEDICINE

## 2024-02-16 PROCEDURE — 85025 COMPLETE CBC W/AUTO DIFF WBC: CPT | Performed by: HOSPITALIST

## 2024-02-16 PROCEDURE — 80048 BASIC METABOLIC PNL TOTAL CA: CPT | Performed by: INTERNAL MEDICINE

## 2024-02-16 PROCEDURE — 87493 C DIFF AMPLIFIED PROBE: CPT | Performed by: HOSPITALIST

## 2024-02-16 RX ORDER — AMOXICILLIN AND CLAVULANATE POTASSIUM 875; 125 MG/1; MG/1
875 TABLET, FILM COATED ORAL EVERY 12 HOURS SCHEDULED
Status: DISCONTINUED | OUTPATIENT
Start: 2024-02-16 | End: 2024-02-16

## 2024-02-16 RX ORDER — FUROSEMIDE 10 MG/ML
20 INJECTION INTRAMUSCULAR; INTRAVENOUS DAILY
Status: DISCONTINUED | OUTPATIENT
Start: 2024-02-16 | End: 2024-02-16

## 2024-02-16 RX ORDER — FLUTICASONE PROPIONATE 50 MCG
2 SPRAY, SUSPENSION (ML) NASAL DAILY
Qty: 16 G | Refills: 0 | Status: SHIPPED | OUTPATIENT
Start: 2024-02-16

## 2024-02-16 RX ORDER — AMOXICILLIN AND CLAVULANATE POTASSIUM 875; 125 MG/1; MG/1
875 TABLET, FILM COATED ORAL EVERY 12 HOURS SCHEDULED
Qty: 20 TABLET | Refills: 1 | Status: SHIPPED | OUTPATIENT
Start: 2024-02-16 | End: 2024-02-26

## 2024-02-16 NOTE — PLAN OF CARE
Appetite good, some dyspnea with exertion, up with standby assist, room air, flu expanded panel was negative, urine pending, 2D echo done, potassium replaced per protocol, iv abx, orthostatic BP done, no complaints of pain, contact/droplet precautions.  Daughter and patient up dated on care plan.    Problem: Patient Centered Care  Goal: Patient preferences are identified and integrated in the patient's plan of care  Description: Interventions:  - What would you like us to know as we care for you? -  - Provide timely, complete, and accurate information to patient/family  - Incorporate patient and family knowledge, values, beliefs, and cultural backgrounds into the planning and delivery of care  - Encourage patient/family to participate in care and decision-making at the level they choose  - Honor patient and family perspectives and choices  Outcome: Progressing     Problem: Patient/Family Goals  Goal: Patient/Family Long Term Goal  Description: Patient's Long Term Goal: -    Interventions:  - -  - See additional Care Plan goals for specific interventions  Outcome: Progressing  Goal: Patient/Family Short Term Goal  Description: Patient's Short Term Goal: -    Interventions:   - -  - See additional Care Plan goals for specific interventions  Outcome: Progressing     Problem: RESPIRATORY - ADULT  Goal: Achieves optimal ventilation and oxygenation  Description: INTERVENTIONS:  - Assess for changes in respiratory status  - Assess for changes in mentation and behavior  - Position to facilitate oxygenation and minimize respiratory effort  - Oxygen supplementation based on oxygen saturation or ABGs  - Provide Smoking Cessation handout, if applicable  - Encourage broncho-pulmonary hygiene including cough, deep breathe, Incentive Spirometry  - Assess the need for suctioning and perform as needed  - Assess and instruct to report SOB or any respiratory difficulty  - Respiratory Therapy support as indicated  - Manage/alleviate  anxiety  - Monitor for signs/symptoms of CO2 retention  Outcome: Progressing     Problem: CARDIOVASCULAR - ADULT  Goal: Maintains optimal cardiac output and hemodynamic stability  Description: INTERVENTIONS:  - Monitor vital signs, rhythm, and trends  - Monitor for bleeding, hypotension and signs of decreased cardiac output  - Evaluate effectiveness of vasoactive medications to optimize hemodynamic stability  - Monitor arterial and/or venous puncture sites for bleeding and/or hematoma  - Assess quality of pulses, skin color and temperature  - Assess for signs of decreased coronary artery perfusion - ex. Angina  - Evaluate fluid balance, assess for edema, trend weights  Outcome: Progressing  Goal: Absence of cardiac arrhythmias or at baseline  Description: INTERVENTIONS:  - Continuous cardiac monitoring, monitor vital signs, obtain 12 lead EKG if indicated  - Evaluate effectiveness of antiarrhythmic and heart rate control medications as ordered  - Initiate emergency measures for life threatening arrhythmias  - Monitor electrolytes and administer replacement therapy as ordered  Outcome: Progressing

## 2024-02-16 NOTE — DISCHARGE SUMMARY
[facility] Medicine Discharge Summary   Patient ID:  Karin May  [id number]  89 year old  3/2/1925    Admit date: 2/14/2024    Discharge date and time: 2/16/2024     Attending Physician: Jocelyne De Anda MD     Primary Care Physician: Kymberly Jefferson MD     Admit Dx: Bilateral pleural effusion [J90]  Community acquired pneumonia, unspecified laterality [J18.9]      Discharge Diagnosis   Dyspnea cough  Abn CXR bl infiltrates and small bl pleural effusion  Breast cancer  MDD/IVETTE  GERD  Hearing loss    Discharged Condition: stable    Disposition: home    Important follow up:  Kymberly Jefferson MD  [place] [address] [facility] [address] [phone]    Schedule an appointment as soon as possible for a visit      Celestino Castillo MD  [address] [facility] [address]  [facility] [address]  [phone]    Schedule an appointment as soon as possible for a visit          Please refer to prior H&P on admission date.    Hospital Course:     Dyspnea, cough  and abn CXR  -  pulm and cards cs, empric abx  - BNP ok - echo ok- diuresis by cards in house but not thought fm CHF- no lasix at dc   - ddx includes cancer w hx d/w patient  - check PCT- not drawn yest will attempt to add on  - CT chest \"Bilateral multifocal peripheral predominant opacities \" + LAD   effusion too small to tap  - [facility] pulm recs- thought presumed pna/ sinusitis covered w abx as such f/u after dc in 4 weeks w pulm and w repeat cxr     Presyncope/ lightheadedness/ head fullness  - sound orthostatic fm history- but obtained and negative  - covered for sinus infection + flonase added w noted sx improvement     MDD, IVETTE, GERD  - home meds as able    Day of discharge Exam   Vitals:    02/16/24 1125   BP: (P) 124/78   Pulse: (P) 92   Resp:    Temp:        Exam on day of discharge:  Gen: No acute distress  CV: RRR  Lungs: CTAB, good respiratory effort  Abdomen: s/nt/nd  Neuro: no focal deficits      Discharge meds     Medication List        START  taking these medications      amoxicillin clavulanate 875-125 MG Tabs  Commonly known as: Augmentin  Take 1 tablet (875 mg total) by mouth every 12 (twelve) hours for 10 days.     fluticasone propionate 50 MCG/ACT Susp  Commonly known as: Flonase  2 sprays by Nasal route daily.            CONTINUE taking these medications      amLODIPine 2.5 MG Tabs  Commonly known as: Norvasc     estradiol 0.1 MG/GM Crea  Commonly known as: Estrace  Apply 1 gm ( fingertip amount) to the vagina nightly for 1 week then every other night indefinitely.     lidocaine-menthol 4-1 % Ptch  Place 1 patch onto the skin daily.     PARoxetine 10 MG Tabs  Commonly known as: Paxil     Vitamin D 50 MCG ( UT) Caps            STOP taking these medications      cefdinir 300 MG Caps  Commonly known as: Omnicef     ciprofloxacin 500 MG Tabs  Commonly known as: Cipro     nitrofurantoin macrocrystal 50 MG Caps  Commonly known as: Macrodantin               Where to Get Your Medications        These medications were sent to [place] [address] [place] [address] [phone], [phone]  [facility] [address] [address]      Phone: [phone]   amoxicillin clavulanate 875-125 MG Tabs  fluticasone propionate 50 MCG/ACT Susp         Consults: IP CONSULT TO PULMONOLOGY  IP CONSULT TO CARDIOLOGY  IP CONSULT TO SOCIAL WORK  IP CONSULT TO SPIRITUAL CARE    Radiology: CARD ECHO 2D DOPPLER (CPT=93306)    Result Date: 2/15/2024  Transthoracic Echocardiogram Name:Karin May Date: 02/15/2024 :  1925 Ht:  (60in)  BP: 187 / 93 MRN:  [id number]    Age:  89years    Wt:  (97lb)  HR: 79bpm Loc:  [facility]       Gndr: F          BSA: 1.37m^2 Sonographer: Coby Olvera Ordering:    Charly Christopher Consulting:  Charly Christopher ---------------------------------------------------------------------------- History/Indications:  Pleural effusion.  Congestive heart failure.  Risk factors:  Hypertension.  ---------------------------------------------------------------------------- Procedure information:  A transthoracic complete 2D study was performed. Additional evaluation included M-mode, complete spectral Doppler, and color Doppler.  Patient status:  Inpatient.  Location:  Bedside.    This was a routine study. Transthoracic echocardiography for diagnosis and ventricular function evaluation. Image quality was adequate. ECG rhythm:   Normal sinus ---------------------------------------------------------------------------- Conclusions: 1. Left ventricle: The cavity size was normal. Wall thickness was normal.    Systolic function was vigorous. The estimated ejection fraction was    65-70%, by visual assessment. Doppler parameters are consistent with    abnormal left ventricular relaxation - grade 1 diastolic dysfunction. 2. Right ventricle: The cavity size was mildly increased. Systolic function    was normal. 3. Left atrium: The left atrial volume was moderately increased. 4. Right atrium: The atrium was mildly dilated. The estimated central venous    pressure is 3mm Hg. 5. Tricuspid valve: There was moderate regurgitation. 6. Pulmonary arteries: Systolic pressure was mildly increased, estimated to    be 39mm Hg. * ---------------------------------------------------------------------------- * Findings: Left ventricle:  The cavity size was normal. Wall thickness was normal. Systolic function was vigorous. The estimated ejection fraction was 65-70%, by visual assessment. Doppler parameters are consistent with abnormal left ventricular relaxation - grade 1 diastolic dysfunction. Left atrium:  The left atrial volume was moderately increased. Right ventricle:  The cavity size was mildly increased. Systolic function was normal. Right atrium:  The atrium was mildly dilated. Mitral valve:  The valve was structurally normal. Leaflet separation was normal.  Doppler:  Transvalvular velocity was within the normal range. There  was no evidence for stenosis. There was no significant regurgitation.    The valve area by pressure half-time was 2.72cm^2. The valve area index by pressure half-time was 1.98cm^2/m^2. Aortic valve:  The valve was structurally normal. The valve was trileaflet. Cusp separation was normal.  Doppler:  Transvalvular velocity was within the normal range. There was no evidence for stenosis. There was trivial regurgitation. Tricuspid valve:  The valve is structurally normal. Leaflet separation was normal.  Doppler:  Transvalvular velocity was within the normal range. There was no evidence for stenosis. There was moderate regurgitation. Pulmonic valve:   The valve is structurally normal. Cusp separation was normal.  Doppler:  Transvalvular velocity was within the normal range. There was no evidence for stenosis. There was trivial regurgitation. Pericardium:   There was no pericardial effusion. Pleura:  No evidence of pleural fluid accumulation. Aorta: Aortic root: The aortic root was normal. Ascending aorta: The ascending aorta was normal. Pulmonary arteries: Systolic pressure was mildly increased, estimated to be 39mm Hg. Systemic veins:  Central venous respirophasic diameter changes are in the normal range (>50%). Inferior vena cava: The IVC was normal-sized. ---------------------------------------------------------------------------- Measurements  Left ventricle                  Value          Ref  IVS thickness, ED, PLAX         0.9   cm       0.6 - 0.9  LV ID, ED, PLAX             (L) 3.2   cm       3.8 - 5.2  LV ID, ES, PLAX             (L) 1.9   cm       2.2 - 3.5  LV PW thickness, ED, PLAX       0.9   cm       0.6 - 0.9  IVS/LV PW ratio, ED, PLAX       1.00           ---------  LV PW/LV ID ratio, ED, PLAX     0.28           ---------  LV ejection fraction            73    %        54 - 74  Stroke volume/bsa, 2D           46    ml/m^2   ---------  LV e', lateral              (L) 5.0   cm/sec   >=10.0  LV E/e',  lateral                12             <=13  LV e', medial               (L) 6.9   cm/sec   >=7.0  LV E/e', medial                 9              ---------  LV e', average                  6.0   cm/sec   ---------  LV E/e', average                10             <=14  LVOT                            Value          Ref  LVOT ID                         2     cm       ---------  LVOT peak velocity, S           1.08  m/sec    ---------  LVOT VTI, S                     20.1  cm       ---------  LVOT peak gradient, S           5     mm Hg    ---------  LVOT mean gradient, S           3     mm Hg    ---------  Stroke volume (SV), LVOT DP     63    ml       ---------  Stroke index (SV/bsa), LVOT     46    ml/m^2   ---------  DP  Aortic root                     Value          Ref  Aortic root ID                  3.3   cm       2.2 - 3.7  Aortic root ID, ED          (L) 2.0   cm       2.2 - 3.7  Ascending aorta                 Value          Ref  Ascending aorta ID              2.8   cm       1.9 - 3.5  Left atrium                     Value          Ref  LA ID, A-P, ES              (L) 2.2   cm       2.7 - 3.8  LA volume, S                (H) 57    ml       22 - 52  LA volume/bsa, S            (H) 42    ml/m^2   16 - 34  LA volume, ES, 1-p A4C      (H) 57    ml       22 - 52  LA/aortic root ratio            0.67           ---------  Mitral valve                    Value          Ref  Mitral E-wave peak velocity     0.6   m/sec    ---------  Mitral A-wave peak velocity     1     m/sec    ---------  Mitral deceleration time        277   ms       ---------  Mitral pressure half-time       81    ms       ---------  Mitral E/A ratio, peak          0.6            ---------  Mitral valve area, PHT, DP      2.72  cm^2     ---------  Mitral valve area/bsa, PHT,     1.98  cm^2/m^2 ---------  DP  Pulmonary artery                Value          Ref  PA pressure, S, DP              39    mm Hg    ---------  Tricuspid valve                  Value          Ref  Tricuspid regurg peak       (H) 3     m/sec    <=2.8  velocity  Tricuspid peak RV-RA            36    mm Hg    ---------  gradient  Systemic veins                  Value          Ref  Estimated CVP                   3     mm Hg    ---------  Right ventricle                 Value          Ref  TAPSE, MM                       1.93  cm       >=1.70  RV pressure, S, DP              39    mm Hg    ---------  RV s', lateral                  18.6  cm/sec   >=9.5 Legend: (L)  and  (H)  lakesha values outside specified reference range. ---------------------------------------------------------------------------- Prepared and electronically signed by Charly Christopher 02/15/2024 12:30     CT CHEST (CPT=71250)    Result Date: 2/14/2024  PROCEDURE: CT CHEST (CPT=71250)  COMPARISON: [facility], XR CHEST AP PORTABLE (CPT=71045), 2/14/2024, 2:40 PM.  [facility], CT PF CHEST W CONTRAST*, 10/24/2006, 1:43 PM.  INDICATIONS: covid test  TECHNIQUE: CT images of the chest were obtained without the administration of contrast material.  Automated exposure control for dose reduction was used. Adjustment of the mA and/or kV was done based on the patient's size. Use of iterative reconstruction  technique for dose reduction was used. Dose information is transmitted to the ACR (American College of Radiology) NRDR (National Radiology Data Registry) which includes the Dose Index Registry.  FINDINGS:   LOWER NECK: Within normal limits for noncontrast technique.  CARDIAC: The heart is mildly enlarged.  There is no pericardial thickening.  There is mild calcific atherosclerosis of coronary arteries.  Annular and mitral annular calcifications.  VASCULATURE: Thrombus cannot be excluded without intravenous contrast. There is no aneurysmal dilation of the thoracic aorta. There is mild-to-moderate calcific atherosclerotic disease.  MEDIASTINUM/KENZIE:  There are an increased number of nonenlarged  bilateral hilar and mediastinal lymph nodes, which are not enlarged by imaging criteria.  CHEST WALL: No axillary mass or enlarged adenopathy.  Clips in the left axilla  LUNGS/ PLEURA: The  trachea and central bronchi are patent.  There is bilateral multifocal peripheral airspace opacity, with associated architectural distortion and layering sub pleural cystic spaces.  Many of the peripheral opacities are somewhat nodular in appearance. There are trace right and small left pleural effusions.   LIMITED ABDOMEN:  There is a moderate to large hiatal hernia.  There is a 1.0 centimeter peripherally calcified lesion at the splenic hilum, incompletely assessed, but which may represent a splenic artery aneurysm.  Cholecystectomy clips noted.  BONES:   There is multilevel degenerative disease of the spine.         CONCLUSION:  Bilateral multifocal peripheral predominant opacities, which can be seen in setting of atypical/COVID pneumonia.  In the absence of prior imaging for comparison, findings can also be secondary to organizing pneumonia, which will be reassessed on follow-up CT.  Peripheral predominant bilateral distortion with associated subpleural cystic spaces.  Findings are suggestive of a component of underlying pulmonary fibrosis.  Multiple prominent bilateral hilar and mediastinal lymph nodes, measuring up to 1.0 centimeters.  These could be reactive in etiology, with other etiologies not excluded at this time.  Recommend follow-up contrast enhanced CT chest in 3 to 6 months to assess resolution.  Moderate to large hiatal hernia.  Small left and trace right pleural effusions.  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    COMPARISON: [facility], XR CHEST PA + LAT CHEST (CPT=71046), 2/26/2020, 11:55 AM.  INDICATIONS: Shortness of breath and scratchy throat x2 months.  TECHNIQUE:   Single view.   FINDINGS:  CARDIAC/VASC: Normal.  No cardiac silhouette abnormality or cardiomegaly.  Unremarkable pulmonary vasculature.  MEDIAST/KENZIE:   No visible mass or adenopathy. LUNGS/PLEURA: Extensive interstitial and alveolar airspace disease bilaterally involving upper and lower lung fields right more than left suggesting bilateral pneumonia with moderate-sized bilateral pleural effusions right more than left.  Correlate clinically and follow-up studies are advised. BONES: No fracture or visible bony lesion.  Probable subcentimeter calcified loose body about the proximal right humerus. OTHER: Clips in the left axilla.         CONCLUSION:  1. Extensive interstitial and alveolar airspace disease bilaterally involving upper lower lung fields right more than left suggesting bilateral pneumonia with moderate-sized bilateral pleural effusions right more than left.  Correlate clinically and follow-up studies are advised.    Dictated by (CST): John Burns MD on 2/14/2024 at 2:57 PM     Finalized by (CST): John Burns MD on 2/14/2024 at 2:58 PM             Operative Procedures:        Total Time Coordinating Care: > than 30 minutes    Patient had opportunity to ask questions and state understand and agree with therapeutic plan as outlined      Jocelyne De Anda MD  [facility] Hospitalist  [phone]  2/16/2024  12:07 PM

## 2024-02-16 NOTE — PROGRESS NOTES
[facility] Pulmonary, Critical Care and Sleep    Karin May Patient Status:  Inpatient    3/2/1925 MRN [id number]   Location [facility]5W Attending Jocelyne De Anda MD   Hosp Day # 2 PCP Kymberly Jefferson MD     Date of Admission: 2024  1:57 PM    Admission Diagnosis: Bilateral pleural effusion [J90]  Community acquired pneumonia, unspecified laterality [J18.9]    S: Feeling better.     Scheduled Medications:     furosemide  20 mg Intravenous Daily    amLODIPine  2.5 mg Oral Daily    fluticasone propionate  1 spray Each Nare Daily    cefTRIAXone  1 g Intravenous Q24H    azithromycin  500 mg Intravenous Q24H    enoxaparin  40 mg Subcutaneous Daily    PARoxetine  10 mg Oral QPM       Infusing Medications:      PRN Medications:  hydrALAZINE, acetaminophen, polyethylene glycol (PEG 3350), sennosides, bisacodyl, fleet enema, ondansetron, metoclopramide    OBJECTIVE:  /81 (BP Location: Right arm)   Pulse 70   Temp 98 °F (36.7 °C) (Oral)   Resp 18   Ht 152.4 cm (5')   Wt 92 lb 3.2 oz (41.8 kg)   SpO2 94%   BMI 18.01 kg/m²    Temp (24hrs), Av.9 °F (36.6 °C), Min:97.5 °F (36.4 °C), Max:98.2 °F (36.8 °C)             Wt Readings from Last 3 Encounters:   24 92 lb 3.2 oz (41.8 kg)   10/30/23 101 lb 6.4 oz (46 kg)   23 101 lb (45.8 kg)       I/O last 3 completed shifts:  In: 220 [P.O.:210; I.V.:10]  Out: -   No intake/output data recorded.     O2: RA  General: NAD.   Neuro: Alert, no focal deficits.    HEENT: PERRL  Neck : No LAD  CV: RRR, nl S1, S2, no S4, S3 or murmur.   Lungs: Clear bilaterally.   Abd: Nontender, non distended.   Ext: No edema.   Skin: No rashes.     Recent Labs   Lab 24  1507 02/15/24  0504 24  0513   WBC 6.2 5.1 5.3   HGB 13.6 12.9 13.5   HCT 40.0 39.1 40.2   .0 224.0 246.0     Recent Labs   Lab 24  1508 02/15/24  0504 02/15/24  2218 24  0513   GLU 91 80  --  86   BUN 16 12  --  13   CREATSERUM 0.64 0.57  --  0.59   CA 9.0 9.1   --  9.0    138  --  138   K 4.0 3.5 3.6 4.0    106  --  107   CO2 28.0 28.0  --  28.0     No results for input(s): \"INR\", \"PTT\" in the last 168 hours.  No results for input(s): \"ABGPHT\", \"TEPSJE5S\", \"HNJEJ8Y\", \"ABGHCO3\", \"SITE\", \"DEV\", \"THGB\" in the last 168 hours.    COVID-19 Lab Results    COVID-19  Lab Results   Component Value Date    COVID19 Not Detected 02/15/2024    COVID19 Not Detected 02/14/2024    COVID19 Not Detected 03/31/2023       Pro-Calcitonin  Recent Labs   Lab 02/15/24  0504 02/16/24  0513   PCT <0.04 <0.04       Cardiac  No results for input(s): \"TROP\", \"PBNP\" in the last 168 hours.    Creatinine Kinase  No results for input(s): \"CK\" in the last 168 hours.    Inflammatory Markers  No results for input(s): \"CRP\", \"PAOLA\", \"LDH\", \"DDIMER\" in the last 168 hours.    Imaging:   Echo 2/15:  1. Left ventricle: The cavity size was normal. Wall thickness was normal.      Systolic function was vigorous. The estimated ejection fraction was      65-70%, by visual assessment. Doppler parameters are consistent with      abnormal left ventricular relaxation - grade 1 diastolic dysfunction.   2. Right ventricle: The cavity size was mildly increased. Systolic function      was normal.   3. Left atrium: The left atrial volume was moderately increased.   4. Right atrium: The atrium was mildly dilated. The estimated central venous      pressure is 3mm Hg.   5. Tricuspid valve: There was moderate regurgitation.   6. Pulmonary arteries: Systolic pressure was mildly increased, estimated to      be 39mm Hg.     Assessment/Plan   Abnormal CXR  Patchy infiltrates c/w pneumonia, infection most likely vs fluid overload.  ? Old pneumonia or resolving.   There does not appear to be sufficient pleural effusion for thoracentesis on L. There is none on R and is likely basilar infiltrate.   Diuresis per cardilogy.   Prior CXR 2020 clear per report.   Cough   [place] to ?sinusitis.    flonase.   Abx  Suspect  pneumonia/sinusitis  Rocephin/azithro 2/14.   PCT initially negative, and negative on repeat.   Legionella and strep ag expanded RVP. Negative.   Change to augmentin for sinusitis coverage for d/c.   CV:   HFpEF  Per cardiology.   Dispo  Could d/c from pulm standpoint on abx and flonase.   F/u CXR in 4 weeks and f/u with pulmonary APN/MD at that time. CXR order placed in [facility].     My best regards,         Celestino Castillo MD  [facility] Pulmonary, Critical Care and Sleep Medicine

## 2024-02-16 NOTE — PLAN OF CARE
Problem: RESPIRATORY - ADULT  Goal: Achieves optimal ventilation and oxygenation  Description: INTERVENTIONS:  - Assess for changes in respiratory status  - Assess for changes in mentation and behavior  - Position to facilitate oxygenation and minimize respiratory effort  - Oxygen supplementation based on oxygen saturation or ABGs  - Provide Smoking Cessation handout, if applicable  - Encourage broncho-pulmonary hygiene including cough, deep breathe, Incentive Spirometry  - Assess the need for suctioning and perform as needed  - Assess and instruct to report SOB or any respiratory difficulty  - Respiratory Therapy support as indicated  - Manage/alleviate anxiety  - Monitor for signs/symptoms of CO2 retention  Outcome: Progressing     Problem: CARDIOVASCULAR - ADULT  Goal: Maintains optimal cardiac output and hemodynamic stability  Description: INTERVENTIONS:  - Monitor vital signs, rhythm, and trends  - Monitor for bleeding, hypotension and signs of decreased cardiac output  - Evaluate effectiveness of vasoactive medications to optimize hemodynamic stability  - Monitor arterial and/or venous puncture sites for bleeding and/or hematoma  - Assess quality of pulses, skin color and temperature  - Assess for signs of decreased coronary artery perfusion - ex. Angina  - Evaluate fluid balance, assess for edema, trend weights  Outcome: Progressing  Goal: Absence of cardiac arrhythmias or at baseline  Description: INTERVENTIONS:  - Continuous cardiac monitoring, monitor vital signs, obtain 12 lead EKG if indicated  - Evaluate effectiveness of antiarrhythmic and heart rate control medications as ordered  - Initiate emergency measures for life threatening arrhythmias  - Monitor electrolytes and administer replacement therapy as ordered  Outcome: Progressing     Patient alert, vitals stable. On room air. Iv abx administered. Bed alarm on.

## 2024-02-16 NOTE — DISCHARGE INSTRUCTIONS
F/u CXR in 4 weeks and f/u with pulmonary APN/MD at that time. CXR order placed in [facility].

## 2024-02-16 NOTE — PROGRESS NOTES
[facility] CARDIOLOGY Progress Note      Karin May is a 89 year old female who I assessed as follows:  Chief Complaint   Patient presents with    Covid-19 Test    Shortness Of Breath          REASON FOR CONSULTATION:    assess for CHF            ASSESSMENT/PLAN:     IMPRESSIONS:  Dyspnea with abnormal CXR and pleural effusions. Looks like CHF though her BNP is normal and she is not obese. CT chest abnormal but no clearcut etiology. Diuresis has not really resulted in much improvement in her dyspnea  HTN, reasonable control in this elderly lady  Varicose veins, s/p stripping. Chronic LE edema from this for years  [place]  Pulm HTN, mild on echo, post diuresis        PLAN:  1. Will not continue diuretics at discharge. I do not get the sense that this is CHF. Echo with normal CVP  2. Echo reviewed  3. Follow BP  4. Tele  5. Follow renal function            SUBJECTIVE:    Breathing mildly improved from admission.  Wondering if she can go home. Diuresis with initial dose diuretic but not much after that. No chest pain.         --------------------------------------------------------------------------------------------------------------------------------    HPI:         Presents with dyspnea and nonproductive cough at least a few weeks, getting worse past couple weeks. Notes LE edema but she will not commit to whether worse or not recently. No PND, orthopnea, chest pain.     Does have history of varicose veins s/p stripping.             No current outpatient medications on file.      Past Medical History:   Diagnosis Date    Acid reflux     Anxiety and depression 1/7/2018    Arthritis     Basal cell carcinoma (BCC) of skin of face 1/10/2021    Forehead, right inferior central (biopsy by shave method) + pigmented seborrheic keratosis left inferior central      Bilateral breast cancer (HCC) 12/20/2013    Breast cancer (HCC) 2003    Per NG:Lumpectomy    Essential hypertension     Irritable bowel syndrome      Recurrent urinary tract infection 2019    S/P ear surgery     Per NG: Ear surgery X2    Screening for breast cancer 2019    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 2021    Urinary tract infection 10/29/2013    Varicose veins     Per NG:Stripping and ligation     Past Surgical History:   Procedure Laterality Date    CHOLECYSTECTOMY      Cholecystitis    INNER EAR SURGERY PROC UNLISTED      LUMPECTOMY LEFT      LUMPECTOMY RIGHT      RADIATION RIGHT      2003     Family History   Problem Relation Age of Onset    Heart Disease Father         Per NG: cause of death    Breast Cancer Mother 69        Per NG: Cause of death    Breast Cancer Self 78    Breast Cancer Maternal Cousin Female 92      Social History:  Social History     Socioeconomic History    Marital status:    Tobacco Use    Smoking status: Never    Smokeless tobacco: Never   Substance and Sexual Activity    Alcohol use: No     Alcohol/week: 0.0 standard drinks of alcohol    Drug use: No   Other Topics Concern    Caffeine Concern Yes     Comment: Coffee, 1 cup daily    Exercise No     Social Determinants of Health     Food Insecurity: No Food Insecurity (2024)    Food Insecurity     Food Insecurity: Never true   Transportation Needs: No Transportation Needs (2024)    Transportation Needs     Lack of Transportation: No   Housing Stability: Low Risk  (2024)    Housing Stability     Housing Instability: No          Medications:            Allergies  Allergies   Allergen Reactions    Sulfa Antibiotics DIARRHEA                 EXAM:         TELE: SR, PACs          /81 (BP Location: Right arm)   Pulse 70   Temp 98 °F (36.7 °C) (Oral)   Resp 18   Ht 5' (1.524 m)   Wt 92 lb 3.2 oz (41.8 kg)   SpO2 94%   BMI 18.01 kg/m²   Temp (24hrs), Av.9 °F (36.6 °C), Min:97.5 °F (36.4 °C), Max:98.2 °F (36.8 °C)    Wt Readings from Last 3 Encounters:   24 92 lb 3.2 oz (41.8 kg)   10/30/23 101 lb 6.4 oz (46 kg)    03/31/23 101 lb (45.8 kg)         Intake/Output Summary (Last 24 hours) at 2/16/2024 0809  Last data filed at 2/15/2024 1830  Gross per 24 hour   Intake 120 ml   Output --   Net 120 ml         GENERAL: well developed, well nourished, in no apparent distress  SKIN: no rashes  HEENT: atraumatic, normocephalic, throat without erythema, +[place]  NECK: supple, no bruits  LUNGS: decreased breath sounds in bases  CARDIO: regular with frequent premature beats without murmur or S3   GI: soft, nontender  EXTREMITIES: trace-1+ edema  NEUROLOGY: alert  PSYCH: cooperative          LABORATORY DATA:               ECG: SR, LAE, PAC        ECHO:  1. Left ventricle: The cavity size was normal. Wall thickness was normal.      Systolic function was vigorous. The estimated ejection fraction was      65-70%, by visual assessment. Doppler parameters are consistent with      abnormal left ventricular relaxation - grade 1 diastolic dysfunction.   2. Right ventricle: The cavity size was mildly increased. Systolic function      was normal.   3. Left atrium: The left atrial volume was moderately increased.   4. Right atrium: The atrium was mildly dilated. The estimated central venous      pressure is 3mm Hg.   5. Tricuspid valve: There was moderate regurgitation.   6. Pulmonary arteries: Systolic pressure was mildly increased, estimated to      be 39mm Hg.         Labs:  Recent Labs   Lab 02/14/24  1508 02/15/24  0504 02/15/24  2218 02/16/24  0513   GLU 91 80  --  86   BUN 16 12  --  13   CREATSERUM 0.64 0.57  --  0.59   CA 9.0 9.1  --  9.0    138  --  138   K 4.0 3.5 3.6 4.0    106  --  107   CO2 28.0 28.0  --  28.0     No results for input(s): \"TROP\" in the last 168 hours.  Recent Labs   Lab 02/16/24  0513   RBC 4.53   HGB 13.5   HCT 40.2   MCV 88.7   MCH 29.8   MCHC 33.6   RDW 14.3   NEPRELIM 2.49   WBC 5.3   .0     No results for input(s): \"TROP\", \"TROPHS\", \"CK\" in the last 168 hours.    Imaging:  CT CHEST  (CPT=71250)    Result Date: 2/14/2024  CONCLUSION:  Bilateral multifocal peripheral predominant opacities, which can be seen in setting of atypical/COVID pneumonia.  In the absence of prior imaging for comparison, findings can also be secondary to organizing pneumonia, which will be reassessed on follow-up CT.  Peripheral predominant bilateral distortion with associated subpleural cystic spaces.  Findings are suggestive of a component of underlying pulmonary fibrosis.  Multiple prominent bilateral hilar and mediastinal lymph nodes, measuring up to 1.0 centimeters.  These could be reactive in etiology, with other etiologies not excluded at this time.  Recommend follow-up contrast enhanced CT chest in 3 to 6 months to assess resolution.  Moderate to large hiatal hernia.  Small left and trace right pleural effusions.  Likely 1.0 centimeter peripherally calcified distal splenic artery aneurysm     Dictated by (CST): Lamar Dominguez MD on 2/14/2024 at 8:25 PM     Finalized by (CST): Lamar Dominguez MD on 2/14/2024 at 8:34 PM          XR CHEST AP PORTABLE  (CPT=71045)    Result Date: 2/14/2024  CONCLUSION:  1. Extensive interstitial and alveolar airspace disease bilaterally involving upper lower lung fields right more than left suggesting bilateral pneumonia with moderate-sized bilateral pleural effusions right more than left.  Correlate clinically and follow-up studies are advised.    Dictated by (CST): John Burns MD on 2/14/2024 at 2:57 PM     Finalized by (CST): John Burns MD on 2/14/2024 at 2:58 PM                Charly Christopher MD

## 2024-02-16 NOTE — PLAN OF CARE
Pt okay to discharge home self care per MD, IV removed by RN, AVS reviewed with pt and daughter at bedside updated on plan of care. All questions answered. Pt assisted to lobby via wheelchair by RN staff. Frequent rounding by staff prior to discharge      Problem: Patient Centered Care  Goal: Patient preferences are identified and integrated in the patient's plan of care  Description: Interventions:  - What would you like us to know as we care for you? From home alone, my daughter lives across the street  - Provide timely, complete, and accurate information to patient/family  - Incorporate patient and family knowledge, values, beliefs, and cultural backgrounds into the planning and delivery of care  - Encourage patient/family to participate in care and decision-making at the level they choose  - Honor patient and family perspectives and choices  Outcome: Adequate for Discharge     Problem: Patient/Family Goals  Goal: Patient/Family Long Term Goal  Description: Patient's Long Term Goal:     Interventions:  - See additional Care Plan goals for specific interventions  Outcome: Adequate for Discharge  Goal: Patient/Family Short Term Goal  Description: Patient's Short Term Goal:     Interventions:   - See additional Care Plan goals for specific interventions  Outcome: Adequate for Discharge     Problem: RESPIRATORY - ADULT  Goal: Achieves optimal ventilation and oxygenation  Description: INTERVENTIONS:  - Assess for changes in respiratory status  - Assess for changes in mentation and behavior  - Position to facilitate oxygenation and minimize respiratory effort  - Oxygen supplementation based on oxygen saturation or ABGs  - Provide Smoking Cessation handout, if applicable  - Encourage broncho-pulmonary hygiene including cough, deep breathe, Incentive Spirometry  - Assess the need for suctioning and perform as needed  - Assess and instruct to report SOB or any respiratory difficulty  - Respiratory Therapy support as  indicated  - Manage/alleviate anxiety  - Monitor for signs/symptoms of CO2 retention  Outcome: Adequate for Discharge     Problem: CARDIOVASCULAR - ADULT  Goal: Maintains optimal cardiac output and hemodynamic stability  Description: INTERVENTIONS:  - Monitor vital signs, rhythm, and trends  - Monitor for bleeding, hypotension and signs of decreased cardiac output  - Evaluate effectiveness of vasoactive medications to optimize hemodynamic stability  - Monitor arterial and/or venous puncture sites for bleeding and/or hematoma  - Assess quality of pulses, skin color and temperature  - Assess for signs of decreased coronary artery perfusion - ex. Angina  - Evaluate fluid balance, assess for edema, trend weights  Outcome: Adequate for Discharge  Goal: Absence of cardiac arrhythmias or at baseline  Description: INTERVENTIONS:  - Continuous cardiac monitoring, monitor vital signs, obtain 12 lead EKG if indicated  - Evaluate effectiveness of antiarrhythmic and heart rate control medications as ordered  - Initiate emergency measures for life threatening arrhythmias  - Monitor electrolytes and administer replacement therapy as ordered  Outcome: Adequate for Discharge

## 2024-04-05 ENCOUNTER — HOSPITAL ENCOUNTER (OUTPATIENT)
Dept: CT IMAGING | Facility: HOSPITAL | Age: 89
Discharge: HOME | End: 2024-04-05
Attending: INTERNAL MEDICINE
Payer: MEDICARE

## 2024-04-05 ENCOUNTER — LAB ENCOUNTER (OUTPATIENT)
Dept: LAB | Facility: HOSPITAL | Age: 89
End: 2024-04-05
Attending: INTERNAL MEDICINE
Payer: MEDICARE

## 2024-04-05 DIAGNOSIS — E87.1 HYPONATREMIA: Primary | ICD-10-CM

## 2024-04-05 DIAGNOSIS — Z87.01 HISTORY OF RECENT PNEUMONIA: ICD-10-CM

## 2024-04-05 DIAGNOSIS — R06.02 SOB (SHORTNESS OF BREATH): ICD-10-CM

## 2024-04-05 DIAGNOSIS — R06.09 DOE (DYSPNEA ON EXERTION): ICD-10-CM

## 2024-04-05 PROCEDURE — 36415 COLL VENOUS BLD VENIPUNCTURE: CPT

## 2024-04-05 PROCEDURE — 80048 BASIC METABOLIC PNL TOTAL CA: CPT

## 2024-04-05 PROCEDURE — 71250 CT THORAX DX C-: CPT | Performed by: INTERNAL MEDICINE

## 2024-04-06 DIAGNOSIS — E87.1 HYPOSMOLALITY SYNDROME: Primary | ICD-10-CM

## 2024-05-15 ENCOUNTER — TELEPHONE (OUTPATIENT)
Dept: SURGERY | Facility: CLINIC | Age: 89
End: 2024-05-15

## 2024-05-15 NOTE — TELEPHONE ENCOUNTER
Patient is calling because she wants a refill on the estradiol that Megha prescribed on 8/31/23. She is having bladder infection symptoms again. She is scheduled with Dr. Goldberg on 5/23 to establish future care. Please call.

## 2024-05-16 NOTE — TELEPHONE ENCOUNTER
I s/w pt and she asked if she used up all of the refills on the Estrace cream and pt stated she was not asking for the Estrace only the Nitrofurantoin. I told pt that it has been over 1 yr since pt was prescribed this by RACHEL and I dont think I can refill but she can discuss continuing this med with AR when she comes in for consult next week. I told her that if she develops symptoms in the meantime she should call the office to report. Pt verbalized understanding and compliance.

## 2024-05-23 ENCOUNTER — TELEPHONE (OUTPATIENT)
Dept: SURGERY | Facility: CLINIC | Age: 89
End: 2024-05-23

## 2024-05-24 ENCOUNTER — OFFICE VISIT (OUTPATIENT)
Dept: SURGERY | Facility: CLINIC | Age: 89
End: 2024-05-24

## 2024-05-24 DIAGNOSIS — N39.0 RECURRENT UTI: Primary | ICD-10-CM

## 2024-05-24 RX ORDER — ESTRADIOL 0.1 MG/G
CREAM VAGINAL
Qty: 42.5 G | Refills: 11 | Status: SHIPPED | OUTPATIENT
Start: 2024-05-24

## 2024-05-24 NOTE — PROGRESS NOTES
Bethany Goldberg MD  Department of Urology  [address] [place] [address] [address] [place] [address]    T: [phone]  F: [phone]    To: Kymberly Jefferson MD   [facility] [address] [place] [id number]    Re: Karin May   MRN: [id number]  : 3/2/1925    Dear Kymberly Jefferson MD,    Today I had the pleasure of seeing Karin May in my clinic. As you know, Ms. May is a pleasant 89 year old year old female who I am seeing for follow-up. Patient was last seen in this department on 2023.    Briefly, patient has a history of recurrent urinary tract infections currently on nitrofurantoin prophylaxis prescribed by Silvestre mendoza.  She reports burning in her urine and apparently was started on Estrace cream.       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    Urinary tract infection    Varicose veins    Per NG:Stripping and ligation        PAST SURGICAL HISTORY:  Past Surgical History:   Procedure Laterality Date    Cholecystectomy      Cholecystitis    Inner ear surgery proc unlisted      Lumpectomy left      Lumpectomy right      Radiation right               ALLERGIES:  Allergies   Allergen Reactions    Sulfa Antibiotics DIARRHEA         MEDICATIONS:  Current Outpatient Medications   Medication Instructions    amLODIPine Besylate 2.5 MG Oral Tab TAKE 1 TABLET BY MOUTH EVERY DAY    Cholecalciferol (VITAMIN D) 2000 UNITS Oral Cap 1 capsule, Oral, Daily     estradiol 0.1 MG/GM Vaginal Cream Apply 1 gm ( fingertip amount) to the vagina nightly for 1 week then every other night indefinitely.    fluticasone propionate 50 MCG/ACT Nasal Suspension 2 sprays, Nasal, Daily    lidocaine-menthol 4-1 % External Patch 1 patch, Transdermal, Daily    PARoxetine (PAXIL) 10 mg, Oral, Every evening, take 1 tablet (10MG)  by ORAL route  every day        FAMILY HISTORY:  Family History   Problem Relation Age of Onset    Heart Disease Father         Per NG: cause of death    Breast Cancer Mother 69        Per NG: Cause of death    Breast Cancer Self 78    Breast Cancer Maternal Cousin Female 92        SOCIAL HISTORY:  Social History     Socioeconomic History    Marital status:    Tobacco Use    Smoking status: Never    Smokeless tobacco: Never   Substance and Sexual Activity    Alcohol use: No     Alcohol/week: 0.0 standard drinks of alcohol    Drug use: No   Other Topics Concern    Caffeine Concern Yes     Comment: Coffee, 1 cup daily    Exercise No     Social Determinants of Health     Financial Resource Strain: Low Risk  (3/9/2023)    Received from [facility]    Overall Financial Resource Strain (CARDIA)     Difficulty of Paying Living Expenses: Not hard at all   Food Insecurity: No Food Insecurity (2/14/2024)    Food Insecurity     Food Insecurity: Never true   Transportation Needs: No Transportation Needs (2/14/2024)    Transportation Needs     Lack of Transportation: No   Housing Stability: Low Risk  (2/14/2024)    Housing Stability     Housing Instability: No          PHYSICAL EXAMINATION:  There were no vitals filed for this visit.  CONSTITUTIONAL: No apparent distress, cooperative and communicative  NEUROLOGIC: Alert and oriented   HEAD: Normocephalic, atraumatic   EYES: Sclera non-icteric   ENT: Hearing intact, moist mucous membranes   NECK: No obvious goiter or masses   RESPIRATORY: Normal respiratory effort, Nonlabored breathing on room air  SKIN:  No evident rashes   ABDOMEN: Soft, nontender, nondistended, no rebound tenderness, no guarding, no masses      REVIEW OF SYSTEMS:    A comprehensive 10-point review of systems was completed.  Pertinent positives and negatives are noted in the the HPI.       LABORATORY DATA:  URINE CULTURE 50,000-99,000 CFU/ML Pseudomonas aeruginosa Abnormal            Resulting Agency: [facility] ([facility]     Susceptibility     Pseudomonas aeruginosa     Not Specified    Cefepime <=2 Sensitive    Ceftazidime <=1 Sensitive    Ciprofloxacin <=1 Sensitive    Levofloxacin <=0.25 Sensitive    Meropenem <=1 Sensitive    Piperacillin + Tazobactam <=4 Sensitive    Tobramycin <=1 Sensitive                   URINE CULTURE   No Growth 2 Days            URINE CULTURE No Growth 2 Days        URINE CULTURE   No Growth 2 Days            URINE CULTURE >100,000 CFU/ML Pseudomonas aeruginosa Abnormal            Resulting Agency: [facility] ([facility])     Susceptibility     Pseudomonas aeruginosa     Not Specified    Cefepime <=2 Sensitive    Ceftazidime <=1 Sensitive    Ciprofloxacin <=1 Sensitive    Gentamicin 4 Sensitive    Levofloxacin 1 Sensitive    Meropenem <=1 Sensitive    Piperacillin + Tazobactam <=4 Sensitive                         IMAGING REVIEW:  ADDENDUM:      CORRECTION.  THE POSTVOID BLADDER CONTAINS 232 CC URINE        DICTATED BY (CST): TAMIKO CHEEMA MD ON 9/26/2023 AT 12:35 PM       FINALIZED BY (CST): TAMIKO CHEEMA MD ON 9/26/2023 AT 12:35 PM                    Addended by Ricky Cheema MD on 9/26/2023 12:35 PM     Study Result    Narrative   PROCEDURE:  KIDNEY/BLADDER (CPT=76770)     COMPARISON: [facility],  KIDNEYS (CPT=76775), 11/15/2018, 11:48 AM.     INDICATIONS: Dysuria Recurrent Atrophic vaginitis Urinary retention     TECHNIQUE: Ultrasound examination was performed to visualize the kidneys and bladder.       FINDINGS:  RIGHT KIDNEY: 9.5 cm.  No mass or calculus or hydronephrosis.  Benign  simple cyst superior pole measures 1.4 centimeter .    LEFT KIDNEY: 10 centimeter.  No mass or calculus or hydronephrosis..    BLADDER: Prevoid bladder contains 430 cc of urine.  No mass or hematoma or calculus.  Bilateral jets are identified.  Postvoid bladder contains 2035 cc urine.       CONCLUSION: Significant postvoid residual .  Unobstructed kidneys           DICTATED BY (CST): TAMIKO YATES MD ON 9/21/2023 AT 2:29 PM      FINALIZED BY (CST): TAMIKO YATES MD ON 9/21/2023 AT 2:32 PM                 OTHER RELEVANT DATA:   none     IMPRESSION: Reported frequent urinary tract infections-recommended that urine only be checked if she has symptoms.  She should not have any treatment unless she is undergoing urologic surgery, has symptoms.  Antibiotic prophylaxis is generally not recommended beyond 3 to 6 month periods.    We talked about UTI prevention with continuing good hydration, starting a women's probiotic (bottle should say women's, vaginal, genitourinary; main ingredient should be lactobacillus), Cranberry pills (Ellura, Utiva, Crancap -all are found on [employer] on their respective website; they should have 36 mg PAC), bowel regimen (colace, senna, miralax), voiding before and after sexual activity and using pH balanced soaps. Can continue to check urine and treat when UCx is positive. If this persists,  can consider initiating low dose antibiotic prophylaxis for 6 months versus gentamicin irrigations if she would like a more local therapy. We will also initiate vaginal estrace cream every night for 1 week followed by every other night indefinitely.         PLAN:  Estrace cream refilled  If ultimately she opts to go back on the macrobid, I will order it for her as prophylaxis, however given her lung issues, it is not what I would recommend.  Return to clinic as needed    Thank you for referring this very pleasant patient to my clinic. If you have any questions or concerns, please do not hesitate to contact  me.    Sincerely,  Bethany Goldberg MD    30 minutes were spent on this patient at this visit obtaining a history, reviewing medical records, developing a treatment plan, counseling and discussing treatment strategy with patient, coordination of care and documentation.     The 21st Century Cures Act makes medical notes available to patients in the interest of transparency.  However, please be advised that this is a medical document.  It is intended as a peer to peer communication.  It is written in medical language and may contain abbreviations or verbiage that are technical and unfamiliar.  It may appear blunt or direct.  Medical documents are intended to carry relevant information, facts as evident, and the clinical opinion of the practitioner.

## 2024-05-29 NOTE — TELEPHONE ENCOUNTER
I called pt again and she srtated that her questions are now resolved and thanked me for the call.

## 2024-08-05 ENCOUNTER — HOSPITAL ENCOUNTER (OUTPATIENT)
Age: 89
Discharge: HOME | End: 2024-08-05
Payer: MEDICARE

## 2024-08-05 VITALS
TEMPERATURE: 99 F | HEART RATE: 90 BPM | RESPIRATION RATE: 20 BRPM | OXYGEN SATURATION: 94 % | SYSTOLIC BLOOD PRESSURE: 115 MMHG | DIASTOLIC BLOOD PRESSURE: 68 MMHG

## 2024-08-05 DIAGNOSIS — N30.90 CYSTITIS: Primary | ICD-10-CM

## 2024-08-05 LAB
#MXD IC: 0.7 X10ˆ3/UL (ref 0.1–1)
BILIRUB UR QL STRIP: NEGATIVE
BUN BLD-MCNC: 12 MG/DL (ref 7–18)
CHLORIDE BLD-SCNC: 99 MMOL/L (ref 98–112)
CO2 BLD-SCNC: 25 MMOL/L (ref 21–32)
COLOR UR: YELLOW
CREAT BLD-MCNC: 0.7 MG/DL
EGFRCR SERPLBLD CKD-EPI 2021: 78 ML/MIN/1.73M2 (ref 60–?)
GLUCOSE BLD-MCNC: 96 MG/DL (ref 70–99)
GLUCOSE UR STRIP-MCNC: NEGATIVE MG/DL
HCT VFR BLD AUTO: 40.3 %
HCT VFR BLD CALC: 43 %
HGB BLD-MCNC: 12.8 G/DL
ISTAT IONIZED CALCIUM FOR CHEM 8: 1.25 MMOL/L (ref 1.12–1.32)
KETONES UR STRIP-MCNC: NEGATIVE MG/DL
LYMPHOCYTES # BLD AUTO: 1.7 X10ˆ3/UL (ref 1–4)
LYMPHOCYTES NFR BLD AUTO: 23.2 %
MCH RBC QN AUTO: 29.3 PG (ref 26–34)
MCHC RBC AUTO-ENTMCNC: 31.8 G/DL (ref 31–37)
MCV RBC AUTO: 92.2 FL (ref 80–100)
MIXED CELL %: 9.2 %
NEUTROPHILS # BLD AUTO: 5 X10ˆ3/UL (ref 1.5–7.7)
NEUTROPHILS NFR BLD AUTO: 67.6 %
NITRITE UR QL STRIP: NEGATIVE
PH UR STRIP: 6.5 [PH]
PLATELET # BLD AUTO: 209 X10ˆ3/UL (ref 150–450)
POTASSIUM BLD-SCNC: 4.6 MMOL/L (ref 3.6–5.1)
PROT UR STRIP-MCNC: 30 MG/DL
RBC # BLD AUTO: 4.37 X10ˆ6/UL
SODIUM BLD-SCNC: 135 MMOL/L (ref 136–145)
SP GR UR STRIP: 1.01
UROBILINOGEN UR STRIP-ACNC: <2 MG/DL
WBC # BLD AUTO: 7.4 X10ˆ3/UL (ref 4–11)

## 2024-08-05 PROCEDURE — 81002 URINALYSIS NONAUTO W/O SCOPE: CPT | Performed by: PHYSICIAN ASSISTANT

## 2024-08-05 PROCEDURE — 85025 COMPLETE CBC W/AUTO DIFF WBC: CPT | Performed by: PHYSICIAN ASSISTANT

## 2024-08-05 PROCEDURE — 99214 OFFICE O/P EST MOD 30 MIN: CPT | Performed by: PHYSICIAN ASSISTANT

## 2024-08-05 PROCEDURE — 80047 BASIC METABLC PNL IONIZED CA: CPT | Performed by: PHYSICIAN ASSISTANT

## 2024-08-05 RX ORDER — CIPROFLOXACIN 500 MG/1
500 TABLET, FILM COATED ORAL 2 TIMES DAILY
Qty: 14 TABLET | Refills: 0 | Status: SHIPPED | OUTPATIENT
Start: 2024-08-05 | End: 2024-08-12

## 2024-08-05 NOTE — ED PROVIDER NOTES
Chief Complaint   Patient presents with    Urinary Symptoms       HPI:     Karin May is a 89 year old female who presents for evaluation of urinary urgency over the last few days.  Patient took an Azo UTI test with positive nitrates.  Denies history of recent UTI or antibiotic in last months.  Patient does have previous history of Pseudomonas growth on record.  Patient notes urgency without output since 10 AM this morning, denies history of retention or previous incontinence or Sanders need.  Denies associated antipruritic use since onset, afebrile on arrival.  Pain maximum, 2 out of 10.  Denies associated headache dizziness neck pain chest pain shortness of breath abdominal pain hematuria vaginal bleeding discharge.      PFSH    PFSH asessment screens reviewed and agree.  Nurses notes reviewed I agree with documentation.    Family History   Problem Relation Age of Onset    Heart Disease Father         Per NG: cause of death    Breast Cancer Mother 69        Per NG: Cause of death    Breast Cancer Self 78    Breast Cancer Maternal Cousin Female 92     Family history reviewed with patient/caregiver and is not pertinent to presenting problem.  Social History     Socioeconomic History    Marital status:      Spouse name: Not on file    Number of children: Not on file    Years of education: Not on file    Highest education level: Not on file   Occupational History    Not on file   Tobacco Use    Smoking status: Never    Smokeless tobacco: Never   Substance and Sexual Activity    Alcohol use: No     Alcohol/week: 0.0 standard drinks of alcohol    Drug use: No    Sexual activity: Not on file   Other Topics Concern     Service Not Asked    Blood Transfusions Not Asked    Caffeine Concern Yes     Comment: Coffee, 1 cup daily    Occupational Exposure Not Asked    Hobby Hazards Not Asked    Sleep Concern Not Asked    Stress Concern Not Asked    Weight Concern Not Asked    Special Diet Not Asked    Back  Care Not Asked    Exercise No    Bike Helmet Not Asked    Seat Belt Not Asked    Self-Exams Not Asked   Social History Narrative    Not on file     Social Determinants of Health     Financial Resource Strain: Low Risk  (3/9/2023)    [facility]    Overall Financial Resource Strain (CARDIA)     Difficulty of Paying Living Expenses: Not hard at all   Food Insecurity: No Food Insecurity (2/14/2024)    Food Insecurity     Food Insecurity: Never true   Transportation Needs: No Transportation Needs (2/14/2024)    Transportation Needs     Lack of Transportation: No   Physical Activity: Not on file   Stress: Not on file   Social Connections: Not on file   Housing Stability: Low Risk  (2/14/2024)    Housing Stability     Housing Instability: No     Housing Instability Emergency: Not on file     Crib or Bassinette: Not on file         ROS:   Positive for stated complaint: Urinary urgency.  All other systems reviewed and negative except as noted above.  Constitutional and Vital Signs Reviewed.      Physical Exam:     Findings:    /68   Pulse 90   Temp 98.6 °F (37 °C) (Temporal)   Resp 20   SpO2 94%   GENERAL: well developed, well nourished, well hydrated, no distress  SKIN: good skin turgor, no obvious rashes  NECK: supple, no adenopathy  EXTREMITIES: no cyanosis or edema. GOMEZ without difficulty  GI: Negative Steward.  Negative McBurney.  Negative rebound.  No adnexal suprapubic or CVA tenderness bilaterally.  Soft, non-tender, normal bowel sounds  HEAD: normocephalic, atraumatic  EYES: sclera non icteric bilateral, conjunctiva clear  EARS: TMs clear bilaterally. Canals clear.  NOSE: No rhinorrhea.  MMM.  Nasal turbinates: pink, normal mucosa  THROAT: clear, without exudates, uvula midline, and airway patent  LUNGS: clear to auscultation bilaterally; no rales, rhonchi, or wheezes  NEURO: No focal deficits  PSYCH: Alert and oriented x3.  Answering  questions appropriately.  Mood appropriate.    MDM/Assessment/Plan:   Orders for this encounter:    Orders Placed This Encounter    POCT CBC     Order Specific Question:   Release to patient     Answer:   Immediate    POCT Urinalysis Dipstick    POCT ISTAT chem8 cartridge    Urine Culture, Routine     Order Specific Question:   Specimen source:     Answer:   Urine, clean catch     Order Specific Question:   Documentation of symptoms of UTI or sepsis:     Answer:   Documentation of symptoms of UTI or sepsis must be corroborated within the EMR     Order Specific Question:   Release to patient     Answer:   Immediate    POCT Urine Dip    iStat (Chem 8)    ciprofloxacin 500 MG Oral Tab     Sig: Take 1 tablet (500 mg total) by mouth 2 (two) times daily for 7 days.     Dispense:  14 tablet     Refill:  0       Labs performed this visit:  Recent Results (from the past 10 hour(s))   POCT Urinalysis Dipstick    Collection Time: 08/05/24  4:06 PM   Result Value Ref Range    Urine Color Yellow Yellow    Urine Clarity Cloudy (A) Clear    Specific Gravity, Urine 1.015 1.005 - 1.030    PH, Urine 6.5 5.0 - 8.0    Protein urine 30 (A) Negative mg/dL    Glucose, Urine Negative Negative mg/dL    Ketone, Urine Negative Negative mg/dL    Bilirubin, Urine Negative Negative    Blood, Urine Small (A) Negative    Nitrite Urine Negative Negative    Urobilinogen urine <2.0 <2.0 mg/dL    Leukocyte esterase urine Large (A) Negative   POCT CBC    Collection Time: 08/05/24  4:19 PM   Result Value Ref Range    WBC IC 7.4 4.0 - 11.0 x10ˆ3/uL    RBC IC 4.37 3.80 - 5.30 X10ˆ6/uL    HGB IC 12.8 12.0 - 16.0 g/dL    HCT IC 40.3 35.0 - 48.0 %    MCV IC 92.2 80.0 - 100.0 fL    MCH IC 29.3 26.0 - 34.0 pg    MCHC IC 31.8 31.0 - 37.0 g/dL    PLT .0 150.0 - 450.0 X10ˆ3/uL    # Neutrophil 5.0 1.5 - 7.7 X10ˆ3/uL    # Lymphocyte 1.7 1.0 - 4.0 X10ˆ3/uL    # Mixed Cells 0.7 0.1 - 1.0 X10ˆ3/uL    Neutrophil % 67.6 %    Lymphocyte % 23.2 %    Mixed Cell % 9.2  %   POCT ISTAT chem8 cartridge    Collection Time: 08/05/24  4:49 PM   Result Value Ref Range    ISTAT Sodium 135 (L) 136 - 145 mmol/L    ISTAT BUN 12 7 - 18 mg/dL    ISTAT Potassium 4.6 3.6 - 5.1 mmol/L    ISTAT Chloride 99 98 - 112 mmol/L    ISTAT Ionized Calcium 1.25 1.12 - 1.32 mmol/L    ISTAT Hematocrit 43 34 - 50 %    ISTAT Glucose 96 70 - 99 mg/dL    ISTAT TCO2 25 21 - 32 mmol/L    ISTAT Creatinine 0.70 0.55 - 1.02 mg/dL    eGFR-Cr 78 >=60 mL/min/1.73m2       MDM:  Patient voided well over 120 cc of urine within a hat for collection.  Urinalysis representative of concern of UTI with large leukocytes negative nitrates.  Culture was sent, based on previous ability profile with Pseudomonas growth patient agreeable to ciprofloxacin after CBC and chemistry evaluation prior to disposition.  Patient alert nontoxic-appearing instructed patient and daughter on precautions for home as well as indications to reevaluate emergently versus outpatient.  Benign abdominal exam on reassessment.  CBC showed no leukocytosis, chemistries within normal limits.  Patient and family were instructed on indications to reevaluate emergently PENDING follow-up     Dr. Christine notified    Diagnosis:    ICD-10-CM    1. Cystitis  N30.90           All results reviewed and discussed with patient.  See AVS for detailed discharge instructions for your condition today.    Follow Up with:  Kymberly Jefferson MD  [address] [address]  [phone]    Schedule an appointment as soon as possible for a visit in 3 days  FOLLOW UP; GO TO ER FOR BREAKTHROUGH CHANGES/CONCERNS BY INSTRUCTION.

## 2024-11-05 ENCOUNTER — LAB ENCOUNTER (OUTPATIENT)
Dept: LAB | Age: 89
End: 2024-11-05
Payer: MEDICARE

## 2024-11-05 ENCOUNTER — TELEPHONE (OUTPATIENT)
Dept: SURGERY | Facility: CLINIC | Age: 89
End: 2024-11-05

## 2024-11-05 DIAGNOSIS — N39.0 RECURRENT UTI: Primary | ICD-10-CM

## 2024-11-05 DIAGNOSIS — N39.0 RECURRENT UTI: ICD-10-CM

## 2024-11-05 LAB
BILIRUB UR QL: NEGATIVE
COLOR UR: YELLOW
GLUCOSE UR-MCNC: NORMAL MG/DL
KETONES UR-MCNC: NEGATIVE MG/DL
LEUKOCYTE ESTERASE UR QL STRIP.AUTO: 500
PH UR: 7 [PH] (ref 5–8)
SP GR UR STRIP: 1.01 (ref 1–1.03)
UROBILINOGEN UR STRIP-ACNC: NORMAL
WBC #/AREA URNS AUTO: >50 /HPF
WBC CLUMPS UR QL AUTO: PRESENT /HPF

## 2024-11-05 PROCEDURE — 87077 CULTURE AEROBIC IDENTIFY: CPT

## 2024-11-05 PROCEDURE — 87086 URINE CULTURE/COLONY COUNT: CPT

## 2024-11-05 PROCEDURE — 81001 URINALYSIS AUTO W/SCOPE: CPT

## 2024-11-05 RX ORDER — NITROFURANTOIN 25; 75 MG/1; MG/1
100 CAPSULE ORAL 2 TIMES DAILY
Qty: 10 CAPSULE | Refills: 0 | Status: SHIPPED | OUTPATIENT
Start: 2024-11-05 | End: 2024-11-10

## 2024-11-05 NOTE — TELEPHONE ENCOUNTER
Patient has pressure and using the bathroom every hour. Patient asking if she needs an order for urine. Patient finished prescription Cipro on 11/1 that was prescribed by a N.P..   Please contact patient at [phone],thanks.

## 2024-11-05 NOTE — TELEPHONE ENCOUNTER
Called patient, verified name and . Patient says last week she was treated for UTI and she still has symptoms. She took cipro for 3 days and finished taking in on Friday. She says she has burning, frequency and discomfort in her urethra. I let patient know we will place an order for urine test and she can provide an urine sample. We will call her once we receive the results. Also let patient know to make sure she drinks plenty of water.   Patient agreed, verbalized understandings and has no further questions.

## 2024-11-05 NOTE — TELEPHONE ENCOUNTER
----- Message from Marlene Vasquez sent at 11/5/2024  2:19 PM CST -----  Please notify the patient that it appears as though she has a UTI. I'll put an order for Macrobid in for her pharmacy. Awaiting cultures currently.

## 2024-11-05 NOTE — TELEPHONE ENCOUNTER
I s/w pt and informed her of the results and instructions in 's msg as stated below and pt informed that she had 3 days of Cipro last week and she felt better but symptoms are back again. She asked if she should see a doctor and I told her that she has seen Dr. HAYNES in May this year. She asks if she should make an appt and I told her that we can wait to see what the final results are first and then decide. I told her we will contact her with those results.         Lov 5/24  PLAN:  Estrace cream refilled  If ultimately she opts to go back on the macrobid, I will order it for her as prophylaxis, however given her lung issues, it is not what I would recommend.  Return to clinic as needed     Thank you for referring this very pleasant patient to my clinic. If you have any questions or concerns, please do not hesitate to contact me.     Sincerely,  Bethany Goldberg MD

## 2024-11-06 ENCOUNTER — TELEPHONE (OUTPATIENT)
Dept: SURGERY | Facility: CLINIC | Age: 89
End: 2024-11-06

## 2024-11-06 NOTE — TELEPHONE ENCOUNTER
I s/w SL and informed her of pt's complaints as stated below and she gave v/o to tell pt to drink plenty of fluids to avoid dehydration and if pr feels that she needs to stop taking it she can but because she does not want to change the abx now because the final culture results are not available yet and we may find out it is resistance to this med and may have to change it again.

## 2024-11-06 NOTE — TELEPHONE ENCOUNTER
Patient states the nitrofurantoin monohydrate macro (MACROBID) 100 MG Oral Cap is causing her dizziness and loose bowels. Please call.

## 2024-11-06 NOTE — TELEPHONE ENCOUNTER
- s/w pt; explained to pt that as the culture is not yet finalized, and thus abx sensitivities are not yet known, it is best if she ensures increased hydration and if she wishes she can stop the abx and see if that makes her feel better, and then when the culture is final we can determine if change should be made to the abx.    - pt acknowledged understanding and will await call regarding final culture results.

## 2024-11-06 NOTE — TELEPHONE ENCOUNTER
- s/w pt; pt identity verified with name and   - pt c/o feeling \"not right\" when she stands up x 2, and pt also c/o frequent loose Bms.  Asked pt if she was eating prior to taking the medication and she stated that she did.  I also asked pt to ensure adequate hydration, and that I would communicate pt symptoms to  NP.    - routed message to  NP re: possible change of abx

## 2024-12-13 ENCOUNTER — OFFICE VISIT (OUTPATIENT)
Dept: OTOLARYNGOLOGY | Facility: CLINIC | Age: 89
End: 2024-12-13

## 2024-12-13 ENCOUNTER — OFFICE VISIT (OUTPATIENT)
Dept: AUDIOLOGY | Facility: CLINIC | Age: 89
End: 2024-12-13

## 2024-12-13 DIAGNOSIS — H90.6 MIXED CONDUCTIVE AND SENSORINEURAL HEARING LOSS OF BOTH EARS: Primary | ICD-10-CM

## 2024-12-13 DIAGNOSIS — H61.23 BILATERAL IMPACTED CERUMEN: Primary | ICD-10-CM

## 2024-12-13 PROCEDURE — 92593 HEARING AID CHECK, BOTH EARS: CPT | Performed by: AUDIOLOGIST

## 2024-12-13 PROCEDURE — 69210 REMOVE IMPACTED EAR WAX UNI: CPT | Performed by: OTOLARYNGOLOGY

## 2024-12-13 RX ORDER — MUPIROCIN 20 MG/G
1 OINTMENT TOPICAL 3 TIMES DAILY
Qty: 1 EACH | Refills: 0 | Status: SHIPPED | OUTPATIENT
Start: 2024-12-13

## 2024-12-13 NOTE — PROGRESS NOTES
Karin May is a 89 year old female.    Chief Complaint   Patient presents with    Ear Wax     Ear cleaning        HISTORY OF PRESENT ILLNESS    Patient presents for cerumen removal. No other complaints or concerns at this time    Social History     Socioeconomic History    Marital status:    Tobacco Use    Smoking status: Never    Smokeless tobacco: Never   Substance and Sexual Activity    Alcohol use: No     Alcohol/week: 0.0 standard drinks of alcohol    Drug use: No   Other Topics Concern    Caffeine Concern Yes     Comment: Coffee, 1 cup daily    Exercise No       Family History   Problem Relation Age of Onset    Heart Disease Father         Per NG: cause of death    Breast Cancer Mother 69        Per NG: Cause of death    Breast Cancer Self 78    Breast Cancer Maternal Cousin Female 92       Past Medical History:    Acid reflux    Anxiety and depression    Arthritis    Basal cell carcinoma (BCC) of skin of face    Forehead, right inferior central (biopsy by shave method) + pigmented seborrheic keratosis left inferior central      Bilateral breast cancer (HCC)    Breast cancer (HCC)    Per NG:Lumpectomy    Essential hypertension    Irritable bowel syndrome    Recurrent urinary tract infection    S/P ear surgery    Per NG: Ear surgery X2    Screening for breast cancer    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   ENMT Negative Drooling.   Eyes Negative Blurred vision and vision changes.   Respiratory Negative Dyspnea and wheezing.   Cardio Negative Chest pain, irregular heartbeat/palpitations and  syncope.   GI Negative Abdominal pain and diarrhea.   Endocrine Negative Cold intolerance and heat intolerance.   Neuro Negative Tremors.   Psych Negative Anxiety and depression.   Integumentary Negative Frequent skin infections, pigment change and rash.   Hema/Lymph Negative Easy bleeding and easy bruising.           PHYSICAL EXAM    There were no vitals taken for this visit.       Constitutional Normal Overall appearance - Normal.        Neck Exam Normal Inspection - Normal. Palpation - Normal. Parotid gland - Normal. Thyroid gland - Normal.             Head/Face Normal Facial features - Normal. Eyebrows - Normal. Skull - Normal.             Ears Normal Inspection - Right: Normal, Left: Normal. Canal - Right: Normal, Left: Normal. TM - Right: Normal, Left: Normal.   Skin Normal Inspection - Normal.                              Canals:  Right: Canal reveals cerumen impaction,   Left: Canal reveals cerumen impaction,     Tympanic Membranes:  Right: Normal tympanic membrane.   Left: Normal tympanic membrane.     TM Visualized Method:   Right TM examined via otomicroscopy.    Left TM examined via otomicroscopy.      PROCEDURE:    Removal of cerumen impaction   The cerumen impaction was completely removed using microscopy.   Removal was completed by using acurette and/or suction.   Comments: Return to clinic as needed.  Avoid q-tips, water precautions and use over the counter wax remedies as needed.      Current Outpatient Medications:     mupirocin 2 % External Ointment, Apply 1 Application topically 3 (three) times daily., Disp: 1 each, Rfl: 0    estradiol (ESTRACE) 0.1 MG/GM Vaginal Cream, Apply fingertip amount of cream to the vagina (0.5-1g) every night for 1 week and then every other night indefinitely, Disp: 42.5 g, Rfl: 11    fluticasone propionate 50 MCG/ACT Nasal Suspension, 2 sprays by Nasal route daily., Disp: 16 g, Rfl: 0    lidocaine-menthol 4-1 % External Patch, Place 1 patch onto the skin daily., Disp:  30 patch, Rfl: 0    amLODIPine Besylate 2.5 MG Oral Tab, TAKE 1 TABLET BY MOUTH EVERY DAY, Disp: , Rfl:     Cholecalciferol (VITAMIN D) 2000 UNITS Oral Cap, Take 1 capsule (2,000 Units total) by mouth daily., Disp: , Rfl:     PARoxetine 10 MG Oral Tab, Take 1 tablet (10 mg total) by mouth every evening. take 1 tablet (10MG)  by ORAL route  every day, Disp: , Rfl:   ASSESSMENT AND PLAN    1. Bilateral impacted cerumen  Deviated septum to the left some nasal crusting.  Stop fluticasone using mupirocin or Vaseline as needed.      All cerumen was removed using microscopy. I have asked the patient to return to see me as needed for repeat cerumen removal in the future.      Manuel Pineda MD    12/13/2024    10:42 AM

## 2024-12-13 NOTE — PROGRESS NOTES
HEARING AID FOLLOW-UP    Karin May  3/2/1925  [id number]    Patient is here for an annual.    Hearing Aid Information  Jessica Thomas [id number]  Right #[id number]  Left #[id number]  Coupled to custom c-shells  Date Dispensed:  4-28-21  Battery: Rechargeable  Wax Guard: Cerustop       The patient has the following concerns:   Would like gain increased on hearing aids.       In office the following actions were taken:  Cleaned and checked aids.   Good condition.   Cleaned some wax from the pressure vents.     Increased overall gain by 3dB. Tried an increase of 6dB and found that patient got some FB with hand up to ear.       Recheck of air conduction thresholds shows at most 5dB decrease at several frequencies.    Patient is to follow up in as needed or in one year.     12/13/2024  Linda De Jesus

## 2025-01-19 ENCOUNTER — APPOINTMENT (OUTPATIENT)
Dept: GENERAL RADIOLOGY | Facility: HOSPITAL | Age: OVER 89
End: 2025-01-19
Attending: EMERGENCY MEDICINE
Payer: MEDICARE

## 2025-01-19 ENCOUNTER — HOSPITAL ENCOUNTER (EMERGENCY)
Facility: HOSPITAL | Age: OVER 89
Discharge: HOME | End: 2025-01-19
Attending: EMERGENCY MEDICINE
Payer: MEDICARE

## 2025-01-19 ENCOUNTER — APPOINTMENT (OUTPATIENT)
Dept: ULTRASOUND IMAGING | Facility: HOSPITAL | Age: OVER 89
End: 2025-01-19
Attending: EMERGENCY MEDICINE
Payer: MEDICARE

## 2025-01-19 VITALS
BODY MASS INDEX: 18.14 KG/M2 | TEMPERATURE: 98 F | WEIGHT: 90 LBS | RESPIRATION RATE: 20 BRPM | SYSTOLIC BLOOD PRESSURE: 177 MMHG | HEART RATE: 66 BPM | DIASTOLIC BLOOD PRESSURE: 83 MMHG | HEIGHT: 59 IN | OXYGEN SATURATION: 99 %

## 2025-01-19 DIAGNOSIS — R60.0 EDEMA OF LEFT LOWER EXTREMITY: Primary | ICD-10-CM

## 2025-01-19 LAB
ALBUMIN SERPL-MCNC: 4 G/DL (ref 3.2–4.8)
ALP LIVER SERPL-CCNC: 87 U/L
ALT SERPL-CCNC: 13 U/L
ANION GAP SERPL CALC-SCNC: 6 MMOL/L (ref 0–18)
AST SERPL-CCNC: 26 U/L (ref ?–34)
BASOPHILS # BLD AUTO: 0.04 X10(3) UL (ref 0–0.2)
BASOPHILS NFR BLD AUTO: 0.6 %
BILIRUB DIRECT SERPL-MCNC: 0.2 MG/DL (ref ?–0.3)
BILIRUB SERPL-MCNC: 0.7 MG/DL (ref 0.2–0.9)
BUN BLD-MCNC: 16 MG/DL (ref 9–23)
BUN/CREAT SERPL: 21.9 (ref 10–20)
CALCIUM BLD-MCNC: 9.8 MG/DL (ref 8.7–10.4)
CHLORIDE SERPL-SCNC: 104 MMOL/L (ref 98–112)
CO2 SERPL-SCNC: 28 MMOL/L (ref 21–32)
CREAT BLD-MCNC: 0.73 MG/DL
DEPRECATED RDW RBC AUTO: 51.2 FL (ref 35.1–46.3)
EGFRCR SERPLBLD CKD-EPI 2021: 74 ML/MIN/1.73M2 (ref 60–?)
EOSINOPHIL # BLD AUTO: 0.13 X10(3) UL (ref 0–0.7)
EOSINOPHIL NFR BLD AUTO: 1.9 %
ERYTHROCYTE [DISTWIDTH] IN BLOOD BY AUTOMATED COUNT: 14.7 % (ref 11–15)
GLUCOSE BLD-MCNC: 97 MG/DL (ref 70–99)
HCT VFR BLD AUTO: 40.3 %
HGB BLD-MCNC: 13.1 G/DL
IMM GRANULOCYTES # BLD AUTO: 0.03 X10(3) UL (ref 0–1)
IMM GRANULOCYTES NFR BLD: 0.4 %
LYMPHOCYTES # BLD AUTO: 1.7 X10(3) UL (ref 1–4)
LYMPHOCYTES NFR BLD AUTO: 25 %
MCH RBC QN AUTO: 30.6 PG (ref 26–34)
MCHC RBC AUTO-ENTMCNC: 32.5 G/DL (ref 31–37)
MCV RBC AUTO: 94.2 FL
MONOCYTES # BLD AUTO: 0.5 X10(3) UL (ref 0.1–1)
MONOCYTES NFR BLD AUTO: 7.4 %
NEUTROPHILS # BLD AUTO: 4.4 X10 (3) UL (ref 1.5–7.7)
NEUTROPHILS # BLD AUTO: 4.4 X10(3) UL (ref 1.5–7.7)
NEUTROPHILS NFR BLD AUTO: 64.7 %
NT-PROBNP SERPL-MCNC: 560 PG/ML (ref ?–450)
OSMOLALITY SERPL CALC.SUM OF ELEC: 287 MOSM/KG (ref 275–295)
PLATELET # BLD AUTO: 210 10(3)UL (ref 150–450)
POTASSIUM SERPL-SCNC: 4.6 MMOL/L (ref 3.5–5.1)
PROT SERPL-MCNC: 7.4 G/DL (ref 5.7–8.2)
RBC # BLD AUTO: 4.28 X10(6)UL
SODIUM SERPL-SCNC: 138 MMOL/L (ref 136–145)
WBC # BLD AUTO: 6.8 X10(3) UL (ref 4–11)

## 2025-01-19 PROCEDURE — 85025 COMPLETE CBC W/AUTO DIFF WBC: CPT | Performed by: EMERGENCY MEDICINE

## 2025-01-19 PROCEDURE — 36415 COLL VENOUS BLD VENIPUNCTURE: CPT

## 2025-01-19 PROCEDURE — 99284 EMERGENCY DEPT VISIT MOD MDM: CPT

## 2025-01-19 PROCEDURE — 83880 ASSAY OF NATRIURETIC PEPTIDE: CPT | Performed by: EMERGENCY MEDICINE

## 2025-01-19 PROCEDURE — 80076 HEPATIC FUNCTION PANEL: CPT | Performed by: EMERGENCY MEDICINE

## 2025-01-19 PROCEDURE — 71045 X-RAY EXAM CHEST 1 VIEW: CPT | Performed by: EMERGENCY MEDICINE

## 2025-01-19 PROCEDURE — 80048 BASIC METABOLIC PNL TOTAL CA: CPT | Performed by: EMERGENCY MEDICINE

## 2025-01-19 PROCEDURE — 93971 EXTREMITY STUDY: CPT | Performed by: EMERGENCY MEDICINE

## 2025-01-19 RX ORDER — POTASSIUM CHLORIDE 1500 MG/1
20 TABLET, EXTENDED RELEASE ORAL 2 TIMES DAILY
Qty: 8 TABLET | Refills: 0 | Status: SHIPPED | OUTPATIENT
Start: 2025-01-19 | End: 2025-01-23

## 2025-01-19 RX ORDER — FUROSEMIDE 20 MG/1
20 TABLET ORAL DAILY
Qty: 4 TABLET | Refills: 0 | Status: SHIPPED | OUTPATIENT
Start: 2025-01-19 | End: 2025-01-23

## 2025-01-19 NOTE — ED INITIAL ASSESSMENT (HPI)
Pt in wheel chair through triage c/o swelling to lower left leg with increased bruising. No trauma. 4/10 pain.

## 2025-01-19 NOTE — ED PROVIDER NOTES
Patient Seen in: [facility] Emergency Department    History     Chief Complaint   Patient presents with    Swelling Edema       HPI    89-year-old female with a history of chronic lower extremity edema bilaterally, left usually worse than the right however she does report new ecchymosis appearance to the left leg from the ankle superiorly to the proximal tibia.  She denies any falls or trauma to her knowledge or any twists.  Denies any fevers or discharge.    History reviewed.   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   Past Surgical History:   Procedure Laterality Date    Cholecystectomy      Cholecystitis    Inner ear surgery proc unlisted      Lumpectomy left      Lumpectomy right      Radiation right      2003         Medications :  Prescriptions Prior to Admission[1]     Family History   Problem Relation Age of Onset    Heart Disease Father         Per NG: cause of death    Breast Cancer Mother 69        Per NG: Cause of death    Breast Cancer Self 78    Breast Cancer Maternal Cousin Female 92       Smoking Status:   Social History     Socioeconomic History    Marital status:    Tobacco Use    Smoking status: Never    Smokeless tobacco: Never   Substance and Sexual Activity    Alcohol use: No     Alcohol/week: 0.0 standard drinks of alcohol    Drug use: No   Other Topics Concern    Caffeine Concern Yes      Comment: Coffee, 1 cup daily    Exercise No       Constitutional and vital signs reviewed.      Social History and Family History elements reviewed from today, pertinent positives to the presenting problem noted.    Physical Exam     ED Triage Vitals [01/19/25 1153]   BP (!) 171/86   Pulse 93   Resp 18   Temp 97.5 °F (36.4 °C)   Temp src Oral   SpO2 95 %   O2 Device None (Room air)       All measures to prevent infection transmission during my interaction with the patient were taken. The patient was already wearing a droplet mask on my arrival to the room. Personal protective equipment was worn throughout the duration of the exam.  Handwashing was performed prior to and after the exam.  Stethoscope and any equipment used during my examination was cleaned with super sani-cloth germicidal wipes following the exam.     Physical Exam    General: NAD  Head: Normocephalic and atraumatic.  Mouth/Throat/Ears/Nose: No hoarseness of voice  Eyes: Conjunctivae and EOM are normal.  Neck: Normal range of motion. Supple.   Cardiovascular: Normal rate, regular rhythm  Respiratory/Chest: No tachypnea.  Clear breath sounds  Gastrointestinal: Nondistended  Musculoskeletal: Bilateral pretibial pitting edema, left greater than right, bilateral 2+ DP and PT pulses.  Sensation intact to light touch.  Right lower extremity with normal skin color appearance, left lower extremity with mild ecchymosis at the lateral aspect of the lower leg and ankle and foot.  There is no associated tenderness at the foot, ankle, tibia, fibula, knee.  She does have 5 out of 5 left plantarflexion, knee extension strength.  Neurological: Alert and appropriate.   Skin: Skin is warm and dry. No pallor.  Psychiatric: Has a normal mood and affect.      ED Course        Labs Reviewed   BASIC METABOLIC PANEL (8) - Abnormal; Notable for the following components:       Result Value    BUN/CREA Ratio 21.9 (*)     All other components within normal limits   CBC WITH  DIFFERENTIAL WITH PLATELET - Abnormal; Notable for the following components:    RDW-SD 51.2 (*)     All other components within normal limits   PRO BETA NATRIURETIC PEPTIDE - Abnormal; Notable for the following components:    Pro-Beta Natriuretic Peptide 560 (*)     All other components within normal limits   HEPATIC FUNCTION PANEL (7) - Normal       As Interpreted by me    Imaging Results Available and Reviewed while in ED: US VENOUS DOPPLER LEG LEFT - DIAG IMG (CPT=93971)    Result Date: 1/19/2025  CONCLUSION:  1. No left lower extremity DVT.    Dictated by (CST): Sam Ordonez MD on 1/19/2025 at 2:03 PM     Finalized by (CST): Sam Ordonez MD on 1/19/2025 at 2:05 PM          XR CHEST AP PORTABLE  (CPT=71045)    Result Date: 1/19/2025  CONCLUSION:  1. CHF/fluid overload superimposed on scarring. 2. Large retrocardiac hiatal hernia.    Dictated by (CST): Sam Ordonez MD on 1/19/2025 at 1:31 PM     Finalized by (CST): Sam Ordonez MD on 1/19/2025 at 1:33 PM         ED Medications Administered: Medications - No data to display      MDM     Vitals:    01/19/25 1153 01/19/25 1215 01/19/25 1415   BP: (!) 171/86 (!) 158/92 (!) 177/83   Pulse: 93 85 66   Resp: 18 24 20   Temp: 97.5 °F (36.4 °C)     TempSrc: Oral     SpO2: 95% 100% 99%   Weight: 40.8 kg     Height: 149.9 cm (4' 11\")       *I personally reviewed and interpreted all ED vitals.    Pulse Ox: 100%, Room air, Normal       Medical Decision Making      Differential Diagnosis/ Diagnostic Considerations: Contusion, DVT, lymphedema    Complicating Factors: The patient already has HTN to contribute to the complexity of this ED evaluation.    I reviewed prior chart records including office note from December 13, 2024.     Considered admission however chest x-ray unremarkable for infiltrate to suggest pneumonia; there is mild pulmonary edema for which lasix will be prescribed.  Ultrasound of the left lower extremity negative for DVT.  Lab work unremarkable for  acute findings.  Discussed compressive stockings.  Discharged home in stable condition.  She was advised to follow-up closely with PCP in the outpatient setting.  Patient and daughter are comfortable with the plan.    Prescriptions: as above    Disposition and Plan     Clinical Impression:  1. Edema of left lower extremity        Disposition:  Discharge    Follow-up:  Kymberly Jefferson MD  [address] [address]  [phone]    Schedule an appointment as soon as possible for a visit in 1 day(s)        Medications Prescribed:  Discharge Medication List as of 1/19/2025  2:23 PM        START taking these medications    Details   furosemide 20 MG Oral Tab Take 1 tablet (20 mg total) by mouth daily for 4 days., Normal, Disp-4 tablet, R-0      potassium chloride 20 MEQ Oral Tab CR Take 1 tablet (20 mEq total) by mouth 2 (two) times daily for 4 days., Normal, Disp-8 tablet, R-0                              [1] (Not in a hospital admission)

## 2025-02-18 ENCOUNTER — HOSPITAL ENCOUNTER (OUTPATIENT)
Dept: ULTRASOUND IMAGING | Facility: HOSPITAL | Age: OVER 89
Discharge: HOME | End: 2025-02-18
Attending: INTERNAL MEDICINE
Payer: MEDICARE

## 2025-02-18 DIAGNOSIS — R60.0 LOWER EXTREMITY EDEMA: ICD-10-CM

## 2025-02-18 PROCEDURE — 93922 UPR/L XTREMITY ART 2 LEVELS: CPT | Performed by: INTERNAL MEDICINE

## 2025-05-14 ENCOUNTER — TELEPHONE (OUTPATIENT)
Dept: OTOLARYNGOLOGY | Facility: CLINIC | Age: OVER 89
End: 2025-05-14

## 2025-05-15 ENCOUNTER — OFFICE VISIT (OUTPATIENT)
Dept: OTOLARYNGOLOGY | Facility: CLINIC | Age: OVER 89
End: 2025-05-15

## 2025-05-15 ENCOUNTER — OFFICE VISIT (OUTPATIENT)
Dept: AUDIOLOGY | Facility: CLINIC | Age: OVER 89
End: 2025-05-15

## 2025-05-15 DIAGNOSIS — H90.6 MIXED CONDUCTIVE AND SENSORINEURAL HEARING LOSS OF BOTH EARS: Primary | ICD-10-CM

## 2025-05-15 DIAGNOSIS — H61.23 BILATERAL IMPACTED CERUMEN: Primary | ICD-10-CM

## 2025-05-15 PROCEDURE — 92593 HEARING AID CHECK, BOTH EARS: CPT | Performed by: AUDIOLOGIST

## 2025-05-15 PROCEDURE — 69210 REMOVE IMPACTED EAR WAX UNI: CPT | Performed by: OTOLARYNGOLOGY

## 2025-05-15 NOTE — PROGRESS NOTES
HEARING AID FOLLOW-UP    Karin May  3/2/1925  [id number]    Patient is here for hearing aid follow up.  Patient was seen as add-on to schedule following visit with Dr. Pineda.    The patient has the following concerns:   Originally thought there could be wax guard in ear, but Dr. Pineda did not find any in ear.     She has had trouble removing the sticks from the cerustop package and states the wax guards fall off when she takes the stick out of the package.   Showed her how to tip the package upside down to see if the white wax guard is still in the package.     She also is requesting a little increase in gain.   Feels she does not hear that well on phone.   Discussed using BT to pair to phone but she does not want to do this.     In office the following actions were taken:  Cleaned and checked aids.   Increased overall gain by 3dB.  (Had also increased by 3dB about 6 months ago.     Patient is to follow up as needed.     5/15/2025  Linda De Jesus

## 2025-05-15 NOTE — PROGRESS NOTES
Karin May is a 89 year old female.    Chief Complaint   Patient presents with    Ear Problem     Possible right ear foreign body       HISTORY OF PRESENT ILLNESS    Patient presents for cerumen removal. No other complaints or concerns at this time    Social Hx on file[1]    Family History[2]    Past Medical History[3]    Past Surgical History[4]    REVIEW OF SYSTEMS    System Neg/Pos Details   Constitutional Negative Fatigue, fever and weight loss.   ENMT Negative Drooling.   Eyes Negative Blurred vision and vision changes.   Respiratory Negative Dyspnea and wheezing.   Cardio Negative Chest pain, irregular heartbeat/palpitations and syncope.   GI Negative Abdominal pain and diarrhea.   Endocrine Negative Cold intolerance and heat intolerance.   Neuro Negative Tremors.   Psych Negative Anxiety and depression.   Integumentary Negative Frequent skin infections, pigment change and rash.   Hema/Lymph Negative Easy bleeding and easy bruising.           PHYSICAL EXAM    There were no vitals taken for this visit.       Constitutional Normal Overall appearance - Normal.        Neck Exam Normal Inspection - Normal. Palpation - Normal. Parotid gland - Normal. Thyroid gland - Normal.             Head/Face Normal Facial features - Normal. Eyebrows - Normal. Skull - Normal.             Ears Normal Inspection - Right: Normal, Left: Normal. Canal - Right: Normal, Left: Normal. TM - Right: Normal, Left: Normal.   Skin Normal Inspection - Normal.                              Canals:  Right: Canal reveals cerumen impaction,   Left: Canal reveals cerumen impaction,     Tympanic Membranes:  Right: Normal tympanic membrane.   Left: Normal tympanic membrane.     TM Visualized Method:   Right TM examined via otomicroscopy.    Left TM examined via otomicroscopy.      PROCEDURE:    Removal of cerumen impaction   The cerumen impaction was completely removed using microscopy.   Removal was completed by using acurette and/or suction.    Comments: Return to clinic as needed.  Avoid q-tips, water precautions and use over the counter wax remedies as needed.    Medications - Current[5]  ASSESSMENT AND PLAN    1. Bilateral impacted cerumen  No foreign body noted.  - REMOVAL IMPACTED CERUMEN REQUIRING INSTRUMENTATION, UNILATERAL      All cerumen was removed using microscopy. I have asked the patient to return to see me as needed for repeat cerumen removal in the future.      Manuel Pineda MD    5/15/2025    1:50 PM           [1]   Social History  Socioeconomic History    Marital status:    Tobacco Use    Smoking status: Never    Smokeless tobacco: Never   Vaping Use    Vaping status: Never Used   Substance and Sexual Activity    Alcohol use: No     Alcohol/week: 0.0 standard drinks of alcohol    Drug use: No   Other Topics Concern    Caffeine Concern Yes     Comment: Coffee, 1 cup daily    Exercise No   [2]   Family History  Problem Relation Age of Onset    Heart Disease Father         Per NG: cause of death    Breast Cancer Mother 69        Per NG: Cause of death    Breast Cancer Self 78    Breast Cancer Maternal Cousin Female 92   [3]   Past Medical History:   Acid reflux    Anxiety and depression    Arthritis    Basal cell carcinoma (BCC) of skin of face    Forehead, right inferior central (biopsy by shave method) + pigmented seborrheic keratosis left inferior central      Bilateral breast cancer (HCC)    Breast cancer (HCC)    Per NG:Lumpectomy    Essential hypertension    Irritable bowel syndrome    Recurrent urinary tract infection    S/P ear surgery    Per NG: Ear surgery X2    Screening for breast cancer    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    Urinary tract infection    Varicose veins    Per NG:Stripping and ligation   [4]   Past Surgical History:  Procedure Laterality Date    Cholecystectomy      Cholecystitis    Inner ear surgery proc unlisted      Lumpectomy left      Lumpectomy right      Radiation right       2003   [5]   Current Outpatient Medications:     mupirocin 2 % External Ointment, Apply 1 Application topically 3 (three) times daily., Disp: 1 each, Rfl: 0    estradiol (ESTRACE) 0.1 MG/GM Vaginal Cream, Apply fingertip amount of cream to the vagina (0.5-1g) every night for 1 week and then every other night indefinitely, Disp: 42.5 g, Rfl: 11    fluticasone propionate 50 MCG/ACT Nasal Suspension, 2 sprays by Nasal route daily., Disp: 16 g, Rfl: 0    lidocaine-menthol 4-1 % External Patch, Place 1 patch onto the skin daily., Disp: 30 patch, Rfl: 0    amLODIPine Besylate 2.5 MG Oral Tab, TAKE 1 TABLET BY MOUTH EVERY DAY, Disp: , Rfl:     Cholecalciferol (VITAMIN D) 2000 UNITS Oral Cap, Take 1 capsule (2,000 Units total) by mouth daily., Disp: , Rfl:     PARoxetine 10 MG Oral Tab, Take 1 tablet (10 mg total) by mouth every evening. take 1 tablet (10MG)  by ORAL route  every day, Disp: , Rfl:

## (undated) DIAGNOSIS — R10.2 SUPRAPUBIC PRESSURE: Primary | ICD-10-CM

## (undated) DIAGNOSIS — R39.89 SUSPECTED UTI: Primary | ICD-10-CM

## (undated) NOTE — MR AVS SNAPSHOT
[place]  [address]  [phone]               Thank you for choosing us for your health care visit with Rafael Britt MD.  We are glad to serve you and happy to provide you with this summary Return in about 6 months (around 7/12/2017). [facility]     Call the [employer] for assistance with your inactive [facility] account    If you have questions, you can call [phone] to talk to our [facility] Staff.  Remember, [facility] is NOT to be Aerobic physical activity Regular aerobic physical activity (e.g., brisk walking, light jogging, cycling, swimming, etc.) for a goal of at least 150 minutes per week. Moderation of alcohol consumption Men: limit to <= 2 drinks* per day.   Women and lighte

## (undated) NOTE — ED AVS SNAPSHOT
[facility] Emergency Department    [address]     [place] [address]    Phone:  [phone]    Fax:  [phone] Loren   MRN: [id number]    Department:  [facility] Emergency Department   Date of Visit:  1/ our [address] at [phone]. Your Emergency Department team is here to serve you. You are our top priority. You were examined and treated today on an urgent basis only. This was not a substitute for ongoing medical care.  Often, one Emergency Dep pertaining to these instructions have been answered in a satisfactory manner. 24-Hour Pharmacies        Pharmacy Address Phone Number   Yonny Evangelista [address] [facility] ([address]) [address] [facility]

## (undated) NOTE — ED AVS SNAPSHOT
[facility] Emergency Department    [address]     [place] [address]    Phone:  [phone]    Fax:  [phone] Loren   MRN: [id number]    Department:  [facility] Emergency Department   Date of Visit:  1/ and Class Registration line at [phone] or find a doctor online by visiting [email or web address].    IF THERE IS ANY CHANGE OR WORSENING OF YOUR CONDITION, CALL YOUR PRIMARY CARE PHYSICIAN AT ONCE OR RETURN IMMEDIATELY TO [address] [place] [address].     If

## (undated) NOTE — MR AVS SNAPSHOT
Wyonia Collet   2017 3:00 PM   Office Visit   MRN:  [id number]    Description:  Female : 3/2/1925   Provider:  Jared Martinez   Department:  [facility] Hematology Oncology              Visit Summary      Primary Visit Diagnosi